# Patient Record
Sex: MALE | Race: WHITE | NOT HISPANIC OR LATINO | Employment: FULL TIME | URBAN - METROPOLITAN AREA
[De-identification: names, ages, dates, MRNs, and addresses within clinical notes are randomized per-mention and may not be internally consistent; named-entity substitution may affect disease eponyms.]

---

## 2020-12-10 ENCOUNTER — OFFICE VISIT (OUTPATIENT)
Dept: FAMILY MEDICINE | Facility: CLINIC | Age: 50
End: 2020-12-10
Payer: MEDICAID

## 2020-12-10 VITALS
WEIGHT: 228 LBS | OXYGEN SATURATION: 96 % | HEART RATE: 95 BPM | DIASTOLIC BLOOD PRESSURE: 74 MMHG | HEIGHT: 73 IN | BODY MASS INDEX: 30.22 KG/M2 | TEMPERATURE: 98 F | SYSTOLIC BLOOD PRESSURE: 110 MMHG

## 2020-12-10 DIAGNOSIS — Z12.5 SCREENING PSA (PROSTATE SPECIFIC ANTIGEN): ICD-10-CM

## 2020-12-10 DIAGNOSIS — R73.03 PREDIABETES: ICD-10-CM

## 2020-12-10 DIAGNOSIS — Z11.59 NEED FOR HEPATITIS C SCREENING TEST: ICD-10-CM

## 2020-12-10 DIAGNOSIS — Z13.220 SCREENING, LIPID: ICD-10-CM

## 2020-12-10 DIAGNOSIS — S09.90XA HEAD TRAUMA, INITIAL ENCOUNTER: ICD-10-CM

## 2020-12-10 DIAGNOSIS — R26.9 ABNORMAL GAIT: ICD-10-CM

## 2020-12-10 DIAGNOSIS — M25.562 ACUTE PAIN OF LEFT KNEE: Primary | ICD-10-CM

## 2020-12-10 PROCEDURE — 99204 PR OFFICE/OUTPT VISIT, NEW, LEVL IV, 45-59 MIN: ICD-10-PCS | Mod: S$GLB,,, | Performed by: NURSE PRACTITIONER

## 2020-12-10 PROCEDURE — 99204 OFFICE O/P NEW MOD 45 MIN: CPT | Mod: S$GLB,,, | Performed by: NURSE PRACTITIONER

## 2020-12-10 NOTE — PROGRESS NOTES
SUBJECTIVE:      Patient ID: Jaime Mendoza is a 50 y.o. male.    Chief Complaint: Annual Exam    Mr Mendoza is here today to establish care. He has a history of prediabetes, alcohol abuse and closed head injury(1987). He was previously followed by Dr Ward but has not seen him in years. He has not had an alcoholic drink in 9 years. He is  and has 4 children. He has a history of left knee surgery when he was in highschool. A few months ago he was visiting at his dads house and he got out of a high bed, his knee gave out, he stumbled and he hit head on a table. He did not loose consciousness and did not go to the ER. He says since that time he has pain in his left knee and he feels he does not have good strength in that leg.He denies any neurological deficits since the fall but says he sleeps more often and his gait is abnormal which he thinks could be from his knee.     Knee Pain   The incident occurred more than 1 week ago. The injury mechanism was a fall. The pain is present in the left knee. The quality of the pain is described as aching. The pain has been constant since onset. Pertinent negatives include no numbness. He has tried nothing for the symptoms.   Head Injury   The incident occurred more than 1 week ago. The injury mechanism was a direct blow. There was no loss of consciousness. The volume of blood lost was minimal. The patient is experiencing no pain. Pertinent negatives include no blurred vision, disorientation, headaches, memory loss, numbness, vomiting or weakness. He has tried nothing for the symptoms.       Past Surgical History:   Procedure Laterality Date    EYE SURGERY      KNEE SURGERY      left arm fused  1987    TONSILLECTOMY       Family History   Problem Relation Age of Onset    Hypertension Father     Diabetes Father       Social History     Socioeconomic History    Marital status:      Spouse name: Not on file    Number of children: Not on file    Years of  education: Not on file    Highest education level: Not on file   Occupational History    Not on file   Social Needs    Financial resource strain: Not on file    Food insecurity     Worry: Not on file     Inability: Not on file    Transportation needs     Medical: Not on file     Non-medical: Not on file   Tobacco Use    Smoking status: Current Every Day Smoker     Types: Cigars    Smokeless tobacco: Never Used   Substance and Sexual Activity    Alcohol use: No    Drug use: Not Currently     Comment: alcoholic  clean for 9 years    Sexual activity: Yes     Partners: Female   Lifestyle    Physical activity     Days per week: Not on file     Minutes per session: Not on file    Stress: Not on file   Relationships    Social connections     Talks on phone: Not on file     Gets together: Not on file     Attends Religion service: Not on file     Active member of club or organization: Not on file     Attends meetings of clubs or organizations: Not on file     Relationship status: Not on file   Other Topics Concern    Not on file   Social History Narrative    Not on file     No current outpatient medications on file.     No current facility-administered medications for this visit.      Review of patient's allergies indicates:  No Known Allergies   Past Medical History:   Diagnosis Date    Alcoholic     Head injury, closed     Hyperglycemia      Past Surgical History:   Procedure Laterality Date    EYE SURGERY      KNEE SURGERY      left arm fused  1987    TONSILLECTOMY         Review of Systems   Constitutional: Negative for appetite change, chills, diaphoresis and unexpected weight change.   HENT: Negative for ear discharge, facial swelling, hearing loss, nosebleeds and trouble swallowing.    Eyes: Negative for blurred vision, photophobia, pain and visual disturbance.   Respiratory: Negative for apnea, choking, shortness of breath and wheezing.    Cardiovascular: Negative for chest pain and  "palpitations.   Gastrointestinal: Negative for abdominal pain, blood in stool and vomiting.   Endocrine: Negative for polyphagia.   Genitourinary: Negative for difficulty urinating and hematuria.   Musculoskeletal: Positive for arthralgias (knee pain). Negative for gait problem and joint swelling.        Gait abnormality    Skin: Negative for pallor.   Neurological: Negative for dizziness, seizures, speech difficulty, weakness, numbness and headaches.   Hematological: Does not bruise/bleed easily.   Psychiatric/Behavioral: Negative for agitation, confusion, dysphoric mood and memory loss. The patient is not nervous/anxious.       OBJECTIVE:      Vitals:    12/10/20 1436   BP: 110/74   Pulse: 95   Temp: 98.1 °F (36.7 °C)   TempSrc: Skin   SpO2: 96%   Weight: 103.4 kg (228 lb)   Height: 6' 1" (1.854 m)     Physical Exam  Vitals signs and nursing note reviewed.   Constitutional:       General: He is not in acute distress.     Appearance: He is well-developed.   HENT:      Head: Normocephalic and atraumatic.      Nose: Nose normal.      Mouth/Throat:      Pharynx: Uvula midline.   Eyes:      General: Lids are normal.      Extraocular Movements: Extraocular movements intact.      Conjunctiva/sclera: Conjunctivae normal.      Pupils: Pupils are equal, round, and reactive to light.      Right eye: Pupil is round and reactive.      Left eye: Pupil is round and reactive.   Neck:      Musculoskeletal: Normal range of motion and neck supple.      Thyroid: No thyromegaly.      Vascular: No carotid bruit.   Cardiovascular:      Rate and Rhythm: Normal rate and regular rhythm.      Pulses: Normal pulses.      Heart sounds: Normal heart sounds.   Pulmonary:      Effort: Pulmonary effort is normal.      Breath sounds: Normal breath sounds. No wheezing or rhonchi.   Abdominal:      General: Bowel sounds are normal.      Palpations: Abdomen is soft. Abdomen is not rigid.      Tenderness: There is no abdominal tenderness. "   Musculoskeletal: Normal range of motion.      Left knee: He exhibits swelling. He exhibits no erythema. Tenderness found. Medial joint line tenderness noted.   Lymphadenopathy:      Cervical: No cervical adenopathy.   Skin:     General: Skin is warm and dry.      Nails: There is no clubbing.     Neurological:      Mental Status: He is alert and oriented to person, place, and time.      Motor: Motor function is intact.      Coordination: Coordination is intact.      Gait: Gait abnormal.   Psychiatric:         Attention and Perception: Attention normal.         Mood and Affect: Mood normal.         Speech: Speech normal.         Behavior: Behavior normal. Behavior is cooperative.         Thought Content: Thought content normal.        Assessment:       1. Acute pain of left knee    2. Prediabetes    3. Head trauma, initial encounter    4. Abnormal gait    5. Need for hepatitis C screening test    6. Screening PSA (prostate specific antigen)    7. Screening, lipid        Plan:       Acute pain of left knee  -     Ambulatory referral/consult to Orthopedics; Future; Expected date: 12/17/2020    Prediabetes  -     Comprehensive Metabolic Panel; Future; Expected date: 12/24/2020  -     CBC Auto Differential; Future; Expected date: 12/24/2020  -     Lipid Panel; Future; Expected date: 12/24/2020  -     TSH; Future; Expected date: 01/21/2021  -     Urinalysis; Future; Expected date: 12/24/2020    Head trauma, initial encounter  -     Ambulatory referral/consult to Neurology; Future; Expected date: 12/17/2020  -     CT Head Without Contrast; Future; Expected date: 12/10/2020    Abnormal gait  -     CT Head Without Contrast; Future; Expected date: 12/10/2020    Need for hepatitis C screening test  -     Hepatitis C antibody; Future; Expected date: 12/10/2020    Screening PSA (prostate specific antigen)  -     PSA, Screening; Future; Expected date: 12/24/2020    Screening, lipid  -     Lipid Panel; Future; Expected date:  12/24/2020        Follow up in about 4 weeks (around 1/7/2021) for prediabetes .      12/10/2020 CARMEN Jeronimo, FNP

## 2020-12-10 NOTE — PATIENT INSTRUCTIONS
Knee Pain  Knee pain is very common. Its especially common in active people who put a lot of pressure on their knees, like runners. It affects women more often than men.  Your kneecap (patella) is a thick, round bone. It covers and protects the front portion of your knee joint. It moves along a groove in your thighbone (femur) as part of the patellofemoral joint. A layer of cartilage surrounds the underside of your kneecap. This layer protects it from grinding against your femur.  When this cartilage softens and breaks down, it can cause knee pain. This is partly because of repetitive stress. The stress irritates the lining of the joint. This causes pain in the underlying bone.  What causes knee pain?  Many things can cause knee pain. You may have more than one cause. Some of these include:  · Overuse of the knee joint  · The kneecap doesnt line up with the tissue around it  · Damage to small nerves in the area  · Damage to the ligament-like structure that holds the kneecap in place (retinaculum)  · Breakdown of the bone under the cartilage  · Swelling in the soft tissues around the kneecap  · Injury  You might be more likely to have knee pain if you:  · Exercise a lot  · Recently increased the intensity of your workouts  · Have a body mass index (BMI) greater than 25  · Have poor alignment of your kneecap  · Walk with your feet turned overly outward or inward  · Have weakness in surrounding muscle groups (inner quad or hip adductor muscles)  · Have too much tightness in surrounding muscle groups (hamstrings or iliotibial band)  · Have a recent history of injury to the area  · Are female  Symptoms of knee pain  This type of knee pain is a dull, aching pain in the front of the knee in the area under and around the kneecap. This pain may start quickly or slowly. Your pain might be worse when you squat, run, or sit for a long time. You might also sometimes feel like your knee is giving out. You may have symptoms in  one or both of your knees.  Diagnosing knee pain  Your healthcare provider will ask about your medical history and your symptoms. Be sure to describe any activities that make your knee pain worse. He or she will look at your knee. This will include tests of your range of motion, strength, and areas of pain of your knee. Your knee alignment will be checked.  Your healthcare provider will need to rule out other causes of your knee pain, such as arthritis. You may need an imaging test, such as an X-ray or MRI.  Treatment for knee pain  Treatments that can help ease your symptoms may include:  · Avoiding activities for a while that make your pain worse, returning to activity over time  · Icing the outside of your knee when it causes you pain  · Taking over-the-counter pain medicine  · Wearing a knee brace or taping your knee to support it  · Wearing special shoe inserts to help keep your feet in the proper alignment  · Doing special exercises to stretch and strengthen the muscles around your hip and your knee  These steps help most people manage knee pain. But some cases of knee pain need to be treated with surgery. You may need surgery right away. Or you may need it later if other treatments dont work. Your healthcare provider may refer you to an orthopedic surgeon. He or she will talk with you about your choices.  Preventing knee pain  Losing weight and correcting excess muscle tightness or muscle weakness may help lower your risk.  In some cases, you can prevent knee pain. To help prevent a flare-up of knee pain, you do these things:  · Regularly do all the exercises your doctor or physical therapist advises  · Support your knee as advised by your doctor or physical therapist  · Increase training gradually, and ease up on training when needed  · Have an expert check your gait for running or other sporting activities  · Stretch properly before and after exercise  · Replace your running shoes regularly  · Lose excess  weight     When to call your healthcare provider  Call your healthcare provider right away if:  · Your symptoms dont get better after a few weeks of treatment  · You have any new symptoms   Date Last Reviewed: 4/1/2017  © 0709-8157 [employer]. [address] [place] [address], [place] [address]. All rights reserved. This information is not intended as a substitute for professional medical care. Always follow your healthcare professional's instructions.

## 2020-12-14 ENCOUNTER — TELEPHONE (OUTPATIENT)
Dept: FAMILY MEDICINE | Facility: CLINIC | Age: 50
End: 2020-12-14

## 2020-12-15 ENCOUNTER — HOSPITAL ENCOUNTER (OUTPATIENT)
Dept: RADIOLOGY | Facility: HOSPITAL | Age: 50
Discharge: HOME | End: 2020-12-15
Attending: NURSE PRACTITIONER
Payer: MEDICAID

## 2020-12-15 ENCOUNTER — TELEPHONE (OUTPATIENT)
Dept: FAMILY MEDICINE | Facility: CLINIC | Age: 50
End: 2020-12-15

## 2020-12-15 DIAGNOSIS — R26.9 ABNORMAL GAIT: ICD-10-CM

## 2020-12-15 DIAGNOSIS — S09.90XA HEAD TRAUMA, INITIAL ENCOUNTER: ICD-10-CM

## 2020-12-15 PROCEDURE — 70450 CT HEAD/BRAIN W/O DYE: CPT | Mod: TC

## 2020-12-17 ENCOUNTER — TELEPHONE (OUTPATIENT)
Dept: FAMILY MEDICINE | Facility: CLINIC | Age: 50
End: 2020-12-17

## 2020-12-17 NOTE — TELEPHONE ENCOUNTER
----- Message from Pedro Watt NP sent at 12/16/2020  8:00 AM CST -----  Will review results at upcoming OV

## 2020-12-18 NOTE — TELEPHONE ENCOUNTER
Spoke to pt CT results given, verbalized understanding. Referral faxed to Dr. Nile Guerra per pt request.

## 2020-12-22 ENCOUNTER — OFFICE VISIT (OUTPATIENT)
Dept: URGENT CARE | Facility: CLINIC | Age: 50
End: 2020-12-22
Payer: MEDICAID

## 2020-12-22 VITALS
RESPIRATION RATE: 16 BRPM | DIASTOLIC BLOOD PRESSURE: 82 MMHG | TEMPERATURE: 97 F | WEIGHT: 231 LBS | SYSTOLIC BLOOD PRESSURE: 130 MMHG | HEART RATE: 83 BPM | OXYGEN SATURATION: 94 % | BODY MASS INDEX: 30.48 KG/M2

## 2020-12-22 DIAGNOSIS — J40 BRONCHITIS: ICD-10-CM

## 2020-12-22 DIAGNOSIS — R05.9 COUGH: ICD-10-CM

## 2020-12-22 DIAGNOSIS — J10.1 INFLUENZA A: Primary | ICD-10-CM

## 2020-12-22 LAB
CTP QC/QA: YES
CTP QC/QA: YES
FLUAV AG NPH QL: POSITIVE
FLUBV AG NPH QL: NEGATIVE
SARS-COV-2 RDRP RESP QL NAA+PROBE: NEGATIVE

## 2020-12-22 PROCEDURE — 71046 X-RAY EXAM CHEST 2 VIEWS: CPT | Mod: TC,S$GLB,, | Performed by: EMERGENCY MEDICINE

## 2020-12-22 PROCEDURE — 99204 OFFICE O/P NEW MOD 45 MIN: CPT | Mod: 25,S$GLB,, | Performed by: NURSE PRACTITIONER

## 2020-12-22 PROCEDURE — 87804 INFLUENZA ASSAY W/OPTIC: CPT | Mod: QW,,, | Performed by: NURSE PRACTITIONER

## 2020-12-22 PROCEDURE — 87635 SARS-COV-2 COVID-19 AMP PRB: CPT | Mod: QW,S$GLB,, | Performed by: NURSE PRACTITIONER

## 2020-12-22 PROCEDURE — 87804 POCT INFLUENZA A/B: ICD-10-PCS | Mod: QW,,, | Performed by: NURSE PRACTITIONER

## 2020-12-22 PROCEDURE — 71046 PR XRAY, CHEST, 2 VIEWS: ICD-10-PCS | Mod: TC,S$GLB,, | Performed by: EMERGENCY MEDICINE

## 2020-12-22 PROCEDURE — 87635: ICD-10-PCS | Mod: QW,S$GLB,, | Performed by: NURSE PRACTITIONER

## 2020-12-22 PROCEDURE — 99204 PR OFFICE/OUTPT VISIT, NEW, LEVL IV, 45-59 MIN: ICD-10-PCS | Mod: 25,S$GLB,, | Performed by: NURSE PRACTITIONER

## 2020-12-22 RX ORDER — AZITHROMYCIN 250 MG/1
TABLET, FILM COATED ORAL
Qty: 6 TABLET | Refills: 0 | Status: SHIPPED | OUTPATIENT
Start: 2020-12-22 | End: 2021-01-13

## 2020-12-22 RX ORDER — PREDNISONE 20 MG/1
40 TABLET ORAL DAILY
Qty: 10 TABLET | Refills: 0 | Status: SHIPPED | OUTPATIENT
Start: 2020-12-22 | End: 2020-12-27

## 2020-12-22 RX ORDER — DEXCHLORPHENIRAMINE MALEATE, DEXTROMETHORPHAN HBR, PHENYLEPHRINE HCL 1; 10; 5 MG/5ML; MG/5ML; MG/5ML
5 SYRUP ORAL EVERY 4 HOURS PRN
Qty: 120 ML | Refills: 0 | Status: SHIPPED | OUTPATIENT
Start: 2020-12-22 | End: 2021-01-13

## 2020-12-22 RX ORDER — BALOXAVIR MARBOXIL 40 MG/1
80 TABLET, FILM COATED ORAL ONCE
Qty: 2 TABLET | Refills: 0 | Status: SHIPPED | OUTPATIENT
Start: 2020-12-22 | End: 2020-12-22

## 2020-12-22 RX ORDER — ALBUTEROL SULFATE 90 UG/1
2 AEROSOL, METERED RESPIRATORY (INHALATION) EVERY 6 HOURS PRN
Qty: 18 G | Refills: 0 | Status: SHIPPED | OUTPATIENT
Start: 2020-12-22 | End: 2021-01-13

## 2020-12-22 RX ORDER — IPRATROPIUM BROMIDE 0.5 MG/2.5ML
0.5 SOLUTION RESPIRATORY (INHALATION)
Status: SHIPPED | OUTPATIENT
Start: 2020-12-22

## 2020-12-22 RX ORDER — ALBUTEROL SULFATE 0.83 MG/ML
2.5 SOLUTION RESPIRATORY (INHALATION)
Status: SHIPPED | OUTPATIENT
Start: 2020-12-22

## 2020-12-22 NOTE — PATIENT INSTRUCTIONS
Influenza (Adult)    Influenza is also called the flu. It is a viral illness that affects the air passages of your lungs. It is different from the common cold. The flu can easily be passed from one to person to another. It may be spread through the air by coughing and sneezing. Or it can be spread by touching the sick person and then touching your own eyes, nose, or mouth.  The flu starts 1 to 3 days after you are exposed to the flu virus. It may last for 1 to 2 weeks but many people feel tired or fatigued for many weeks afterward. You usually dont need to take antibiotics unless you have a complication. This might be an ear or sinus infection or pneumonia.  Symptoms of the flu may be mild or severe. They can include extreme tiredness (wanting to stay in bed all day), chills, fevers, muscle aches, soreness with eye movement, headache, and a dry, hacking cough.  Home care  Follow these guidelines when caring for yourself at home:  · Avoid being around cigarette smoke, whether yours or other peoples.  · Acetaminophen or ibuprofen will help ease your fever, muscle aches, and headache. Dont give aspirin to anyone younger than 18 who has the flu. Aspirin can harm the liver.  · Nausea and loss of appetite are common with the flu. Eat light meals. Drink 6 to 8 glasses of liquids every day. Good choices are water, sport drinks, soft drinks without caffeine, juices, tea, and soup. Extra fluids will also help loosen secretions in your nose and lungs.  · Over-the-counter cold medicines will not make the flu go away faster. But the medicines may help with coughing, sore throat, and congestion in your nose and sinuses. Dont use a decongestant if you have high blood pressure.  · Stay home until your fever has been gone for at least 24 hours without using medicine to reduce fever.  Follow-up care  Follow up with your healthcare provider, or as advised, if you are not getting better over the next week.  If you are age 65 or  older, talk with your provider about getting a pneumococcal vaccine every 5 years. You should also get this vaccine if you have chronic asthma or COPD. All adults should get a flu vaccine every fall. Ask your provider about this.  When to seek medical advice  Call your healthcare provider right away if any of these occur:  · Cough with lots of colored mucus (sputum) or blood in your mucus  · Chest pain, shortness of breath, wheezing, or trouble breathing  · Severe headache, or face, neck, or ear pain  · New rash with fever  · Fever of 100.4°F (38°C) or higher, or as directed by your healthcare provider  · Confusion, behavior change, or seizure  · Severe weakness or dizziness  · You get a new fever or cough after getting better for a few days  Date Last Reviewed: 1/1/2017 © 2000-2017 [employer]. [address] [place] [address]. All rights reserved. This information is not intended as a substitute for professional medical care. Always follow your healthcare professional's instructions.        Bronchitis with Wheezing (Viral or Bacterial: Adult)    Bronchitis is an infection of the air passages. It often occurs during a cold and is usually caused by a virus. Symptoms include cough with mucus (phlegm) and low-grade fever. This illness is contagious during the first few days and is spread through the air by coughing and sneezing, or by direct contact (touching the sick person and then touching your own eyes, nose, or mouth).  If there is a lot of inflammation, air flow is restricted. The air passages may also go into spasm, especially if you have asthma. This causes wheezing and difficulty breathing even in people who do not have asthma.  Bronchitis usually lasts 7 to 14 days. The wheezing should improve with treatment during the first week. An inhaler is often prescribed to relax the air passages and stop wheezing. Antibiotics will be prescribed if your doctor thinks there is also a secondary  bacterial infection.  Home care  · If symptoms are severe, rest at home for the first 2 to 3 days. When you go back to your usual activities, don't let yourself get too tired.  · Do not smoke. Also avoid being exposed to secondhand smoke.  · You may use over-the-counter medicine to control fever or pain, unless another medicine was prescribed. Note: If you have chronic liver or kidney disease or have ever had a stomach ulcer or gastrointestinal bleeding, talk with your healthcare provider before using these medicines. Also talk to your provider if you are taking medicine to prevent blood clots.) Aspirin should never be given to anyone younger than 18 years of age who is ill with a viral infection or fever. It may cause severe liver or brain damage.  · Your appetite may be poor, so a light diet is fine. Avoid dehydration by drinking 6 to 8 glasses of fluids per day (such as water, soft drinks, sports drinks, juices, tea, or soup). Extra fluids will help loosen secretions in the nose and lungs.  · Over-the-counter cough, cold, and sore-throat medicines will not shorten the length of the illness, but they may be helpful to reduce symptoms. (Note: Do not use decongestants if you have high blood pressure.)  · If you were given an inhaler, use it exactly as directed. If you need to use it more often than prescribed, your condition may be worsening. If this happens, contact your healthcare provider.  · If prescribed, finish all antibiotic medicine, even if you are feeling better after only a few days.  Follow-up care  Follow up with your healthcare provider, or as advised. If you had an X-ray or ECG (electrocardiogram), a specialist will review it. You will be notified of any new findings that may affect your care.  Note: If you are age 65 or older, or if you have a chronic lung disease or condition that affects your immune system, or you smoke, talk to your healthcare provider about having a pneumococcal vaccinations and  a yearly influenza vaccination (flu shot).  When to seek medical advice  Call your healthcare provider right away if any of these occur:  · Fever of 100.4°F (38°C) or higher  · Coughing up increasing amounts of colored sputum  · Weakness, drowsiness, headache, facial pain, ear pain, or a stiff neck  Call 911, or get immediate medical care  Contact emergency services right away if any of these occur.  · Coughing up blood  · Worsening weakness, drowsiness, headache, or stiff neck  · Increased wheezing not helped with medication, shortness of breath, or pain with breathing  Date Last Reviewed: 9/13/2015  © 4377-3914 [employer]. [address] [place] [address], [place] [address]. All rights reserved. This information is not intended as a substitute for professional medical care. Always follow your healthcare professional's instructions.

## 2020-12-22 NOTE — PROGRESS NOTES
Subjective: post nasal drip, fatigue, runny nose, stuffy nose, congestion x 3 days       Patient ID: Jaime Mendoza is a 50 y.o. male.    Vitals:  weight is 104.8 kg (231 lb). His temperature is 97.4 °F (36.3 °C). His blood pressure is 130/82 and his pulse is 83. His respiration is 16 and oxygen saturation is 94% (abnormal).     Chief Complaint: Sinus Problem (post nasal drip, fatigue, congestion)    patient complains of fatigue, body aches, chest congestion, wheezing, cough, and sinus congestion for 4 days. Denies any known covid exposure. Denies chest pain, sob, nausea, vomiting, diarrhea.     Sinus Problem  This is a new problem. The current episode started in the past 7 days. Associated symptoms include congestion, coughing and sinus pressure. Pertinent negatives include no chills, headaches, shortness of breath or sore throat.       Constitution: Positive for fatigue. Negative for chills and fever.   HENT: Positive for congestion and sinus pressure. Negative for sore throat.    Neck: Negative for painful lymph nodes.   Cardiovascular: Negative for chest pain and leg swelling.   Eyes: Negative for double vision and blurred vision.   Respiratory: Positive for cough and wheezing. Negative for shortness of breath.    Gastrointestinal: Negative for abdominal pain, nausea, vomiting and diarrhea.   Genitourinary: Negative for dysuria, frequency and urgency.   Musculoskeletal: Positive for muscle ache. Negative for joint pain, joint swelling and muscle cramps.   Skin: Negative for color change, pale and rash.   Allergic/Immunologic: Negative for seasonal allergies.   Neurological: Negative for dizziness, history of vertigo, light-headedness, passing out and headaches.   Hematologic/Lymphatic: Negative for swollen lymph nodes, easy bruising/bleeding and history of blood clots. Does not bruise/bleed easily.   Psychiatric/Behavioral: Negative for nervous/anxious, sleep disturbance and depression. The patient is not  nervous/anxious.        Objective:      Physical Exam   Constitutional: He is oriented to person, place, and time. He appears well-developed. He is cooperative.  Non-toxic appearance. He does not appear ill. No distress.   HENT:   Head: Normocephalic and atraumatic.   Ears:   Right Ear: Hearing, tympanic membrane, external ear and ear canal normal.   Left Ear: Hearing, tympanic membrane, external ear and ear canal normal.   Nose: Nose normal. No mucosal edema, rhinorrhea or nasal deformity. No epistaxis. Right sinus exhibits no maxillary sinus tenderness and no frontal sinus tenderness. Left sinus exhibits no maxillary sinus tenderness and no frontal sinus tenderness.   Mouth/Throat: Uvula is midline, oropharynx is clear and moist and mucous membranes are normal. No trismus in the jaw. Normal dentition. No uvula swelling. No posterior oropharyngeal erythema.   Eyes: Conjunctivae and lids are normal. Right eye exhibits no discharge. Left eye exhibits no discharge. No scleral icterus.   Neck: Trachea normal, normal range of motion, full passive range of motion without pain and phonation normal. Neck supple.   Cardiovascular: Normal rate, regular rhythm, normal heart sounds and normal pulses.   Pulmonary/Chest: Effort normal. No respiratory distress. He has wheezes in the right upper field, the right lower field, the left upper field and the left lower field. He has rhonchi in the right upper field, the right lower field, the left upper field and the left lower field.   Abdominal: Soft. Normal appearance and bowel sounds are normal. He exhibits no distension, no pulsatile midline mass and no mass. There is no abdominal tenderness.   Musculoskeletal: Normal range of motion.         General: No deformity.   Neurological: He is alert and oriented to person, place, and time. He exhibits normal muscle tone. Coordination normal. GCS eye subscore is 4. GCS verbal subscore is 5. GCS motor subscore is 6.   Skin: Skin is warm,  dry, intact, not diaphoretic and not pale. Psychiatric: His speech is normal and behavior is normal. Judgment and thought content normal.   Nursing note and vitals reviewed.        Assessment:       1. Cough    2. Influenza A        Plan:       Covid negative. Influenza Positive. CXR small pleural effusion to left lower lung.    Patient is flu positive. Discussed the importance of pushing fluids to stay hydrated, controlling fever with tylenol/motrin, and good handwashing to prevent spread of virus.    Patient reports he is breathing much better after the breathing treatment. Sats increased to 97% after the breathing treatment.  Advised to go to ER for any worsening of symptoms.     Cough  -     POCT COVID-19 Rapid Screening  -     X-Ray Chest PA And Lateral; Future; Expected date: 12/22/2020  -     POCT Influenza A/B    Influenza A    Other orders  -     albuterol nebulizer solution 2.5 mg  -     ipratropium 0.02 % nebulizer solution 0.5 mg  -     baloxavir marboxiL (XOFLUZA) 40 mg tablet; Take 2 tablets (80 mg total) by mouth once. for 1 dose  Dispense: 2 tablet; Refill: 0  -     dexchlorphen-phenylephrine-DM (POLYTUSSIN DM) 1-5-10 mg/5 mL Syrp; Take 5 mLs by mouth every 4 (four) hours as needed.  Dispense: 120 mL; Refill: 0  -     predniSONE (DELTASONE) 20 MG tablet; Take 2 tablets (40 mg total) by mouth once daily. for 5 days  Dispense: 10 tablet; Refill: 0  -     albuterol (PROVENTIL HFA) 90 mcg/actuation inhaler; Inhale 2 puffs into the lungs every 6 (six) hours as needed for Wheezing. Rescue  Dispense: 18 g; Refill: 0  -     azithromycin (ZITHROMAX) 250 MG tablet; Take 2 tablets (500 mg) on  Day 1,  followed by 1 tablet (250 mg) once daily on Days 2 through 5.  Dispense: 6 tablet; Refill: 0

## 2021-01-05 ENCOUNTER — TELEPHONE (OUTPATIENT)
Dept: FAMILY MEDICINE | Facility: CLINIC | Age: 51
End: 2021-01-05

## 2021-01-05 DIAGNOSIS — S09.90XA HEAD TRAUMA, INITIAL ENCOUNTER: ICD-10-CM

## 2021-01-05 DIAGNOSIS — M25.562 ACUTE PAIN OF LEFT KNEE: Primary | ICD-10-CM

## 2021-01-13 ENCOUNTER — OFFICE VISIT (OUTPATIENT)
Dept: FAMILY MEDICINE | Facility: CLINIC | Age: 51
End: 2021-01-13
Payer: MEDICAID

## 2021-01-13 VITALS
HEIGHT: 73 IN | WEIGHT: 227 LBS | TEMPERATURE: 98 F | BODY MASS INDEX: 30.09 KG/M2 | SYSTOLIC BLOOD PRESSURE: 120 MMHG | DIASTOLIC BLOOD PRESSURE: 80 MMHG | OXYGEN SATURATION: 95 % | HEART RATE: 92 BPM

## 2021-01-13 DIAGNOSIS — R73.03 PREDIABETES: Primary | ICD-10-CM

## 2021-01-13 DIAGNOSIS — Z12.11 COLON CANCER SCREENING: ICD-10-CM

## 2021-01-13 DIAGNOSIS — E78.00 ELEVATED LDL CHOLESTEROL LEVEL: ICD-10-CM

## 2021-01-13 DIAGNOSIS — Z87.828 HISTORY OF HEAD INJURY: ICD-10-CM

## 2021-01-13 LAB — HBA1C MFR BLD: 6.4 %

## 2021-01-13 PROCEDURE — 99214 PR OFFICE/OUTPT VISIT, EST, LEVL IV, 30-39 MIN: ICD-10-PCS | Mod: S$GLB,,, | Performed by: NURSE PRACTITIONER

## 2021-01-13 PROCEDURE — 83036 POCT HEMOGLOBIN A1C: ICD-10-PCS | Mod: QW,,, | Performed by: NURSE PRACTITIONER

## 2021-01-13 PROCEDURE — 99214 OFFICE O/P EST MOD 30 MIN: CPT | Mod: S$GLB,,, | Performed by: NURSE PRACTITIONER

## 2021-01-13 PROCEDURE — 83036 HEMOGLOBIN GLYCOSYLATED A1C: CPT | Mod: QW,,, | Performed by: NURSE PRACTITIONER

## 2021-01-21 ENCOUNTER — OFFICE VISIT (OUTPATIENT)
Dept: ORTHOPEDICS | Facility: CLINIC | Age: 51
End: 2021-01-21
Payer: MEDICAID

## 2021-01-21 VITALS
HEART RATE: 88 BPM | WEIGHT: 224 LBS | DIASTOLIC BLOOD PRESSURE: 84 MMHG | SYSTOLIC BLOOD PRESSURE: 120 MMHG | HEIGHT: 73 IN | BODY MASS INDEX: 29.69 KG/M2

## 2021-01-21 DIAGNOSIS — M17.12 PRIMARY OSTEOARTHRITIS OF LEFT KNEE: Primary | ICD-10-CM

## 2021-01-21 DIAGNOSIS — M25.562 ACUTE PAIN OF LEFT KNEE: ICD-10-CM

## 2021-01-21 PROCEDURE — 99203 OFFICE O/P NEW LOW 30 MIN: CPT | Mod: 25,S$GLB,, | Performed by: ORTHOPAEDIC SURGERY

## 2021-01-21 PROCEDURE — 20610 LARGE JOINT ASPIRATION/INJECTION: L KNEE: ICD-10-PCS | Mod: LT,S$GLB,, | Performed by: ORTHOPAEDIC SURGERY

## 2021-01-21 PROCEDURE — 20610 DRAIN/INJ JOINT/BURSA W/O US: CPT | Mod: LT,S$GLB,, | Performed by: ORTHOPAEDIC SURGERY

## 2021-01-21 PROCEDURE — 99203 PR OFFICE/OUTPT VISIT, NEW, LEVL III, 30-44 MIN: ICD-10-PCS | Mod: 25,S$GLB,, | Performed by: ORTHOPAEDIC SURGERY

## 2021-01-21 RX ORDER — METHYLPREDNISOLONE ACETATE 40 MG/ML
40 INJECTION, SUSPENSION INTRA-ARTICULAR; INTRALESIONAL; INTRAMUSCULAR; SOFT TISSUE
Status: DISCONTINUED | OUTPATIENT
Start: 2021-01-21 | End: 2021-01-21 | Stop reason: HOSPADM

## 2021-01-21 RX ADMIN — METHYLPREDNISOLONE ACETATE 40 MG: 40 INJECTION, SUSPENSION INTRA-ARTICULAR; INTRALESIONAL; INTRAMUSCULAR; SOFT TISSUE at 08:01

## 2021-01-27 DIAGNOSIS — R29.898 LEFT LEG WEAKNESS: ICD-10-CM

## 2021-01-27 DIAGNOSIS — I63.9 ISCHEMIC STROKE: ICD-10-CM

## 2021-01-27 DIAGNOSIS — S06.9X0A: Primary | ICD-10-CM

## 2021-02-02 ENCOUNTER — CLINICAL SUPPORT (OUTPATIENT)
Dept: REHABILITATION | Facility: HOSPITAL | Age: 51
End: 2021-02-02
Attending: NURSE PRACTITIONER
Payer: MEDICAID

## 2021-02-02 DIAGNOSIS — I63.9 ISCHEMIC STROKE: ICD-10-CM

## 2021-02-02 DIAGNOSIS — S06.9X0S TRAUMATIC BRAIN INJURY WITHOUT LOSS OF CONSCIOUSNESS, SEQUELA: Primary | ICD-10-CM

## 2021-02-02 DIAGNOSIS — R29.898 LEFT LEG WEAKNESS: ICD-10-CM

## 2021-02-02 DIAGNOSIS — S06.9X0A: ICD-10-CM

## 2021-02-02 DIAGNOSIS — R26.89 BALANCE DISORDER: ICD-10-CM

## 2021-02-02 DIAGNOSIS — R26.81 GAIT INSTABILITY: ICD-10-CM

## 2021-02-02 PROCEDURE — 97162 PT EVAL MOD COMPLEX 30 MIN: CPT | Mod: PN

## 2021-02-09 ENCOUNTER — CLINICAL SUPPORT (OUTPATIENT)
Dept: REHABILITATION | Facility: HOSPITAL | Age: 51
End: 2021-02-09
Payer: MEDICAID

## 2021-02-09 DIAGNOSIS — R26.89 BALANCE DISORDER: ICD-10-CM

## 2021-02-09 DIAGNOSIS — R29.898 WEAKNESS OF LEFT LOWER EXTREMITY: ICD-10-CM

## 2021-02-09 DIAGNOSIS — R26.81 GAIT INSTABILITY: ICD-10-CM

## 2021-02-09 PROCEDURE — 97110 THERAPEUTIC EXERCISES: CPT | Mod: PN

## 2021-02-11 ENCOUNTER — CLINICAL SUPPORT (OUTPATIENT)
Dept: REHABILITATION | Facility: HOSPITAL | Age: 51
End: 2021-02-11
Payer: MEDICAID

## 2021-02-11 DIAGNOSIS — R26.89 BALANCE DISORDER: ICD-10-CM

## 2021-02-11 DIAGNOSIS — R26.81 GAIT INSTABILITY: ICD-10-CM

## 2021-02-11 DIAGNOSIS — R29.898 WEAKNESS OF LEFT LOWER EXTREMITY: ICD-10-CM

## 2021-02-11 PROCEDURE — 97110 THERAPEUTIC EXERCISES: CPT | Mod: PN

## 2021-02-18 ENCOUNTER — CLINICAL SUPPORT (OUTPATIENT)
Dept: REHABILITATION | Facility: HOSPITAL | Age: 51
End: 2021-02-18
Payer: MEDICAID

## 2021-02-18 DIAGNOSIS — R26.89 BALANCE DISORDER: ICD-10-CM

## 2021-02-18 DIAGNOSIS — R26.81 GAIT INSTABILITY: ICD-10-CM

## 2021-02-18 DIAGNOSIS — R29.898 WEAKNESS OF RIGHT LOWER EXTREMITY: ICD-10-CM

## 2021-02-18 PROCEDURE — 97110 THERAPEUTIC EXERCISES: CPT | Mod: PN

## 2021-02-23 ENCOUNTER — CLINICAL SUPPORT (OUTPATIENT)
Dept: REHABILITATION | Facility: HOSPITAL | Age: 51
End: 2021-02-23
Payer: MEDICAID

## 2021-02-23 ENCOUNTER — OFFICE VISIT (OUTPATIENT)
Dept: ORTHOPEDICS | Facility: CLINIC | Age: 51
End: 2021-02-23
Payer: MEDICAID

## 2021-02-23 VITALS
HEART RATE: 68 BPM | WEIGHT: 224 LBS | DIASTOLIC BLOOD PRESSURE: 74 MMHG | SYSTOLIC BLOOD PRESSURE: 132 MMHG | BODY MASS INDEX: 29.69 KG/M2 | HEIGHT: 73 IN

## 2021-02-23 DIAGNOSIS — M17.12 PRIMARY OSTEOARTHRITIS OF LEFT KNEE: Primary | ICD-10-CM

## 2021-02-23 DIAGNOSIS — M17.12 PRIMARY OSTEOARTHRITIS OF LEFT KNEE: ICD-10-CM

## 2021-02-23 DIAGNOSIS — R26.81 GAIT INSTABILITY: ICD-10-CM

## 2021-02-23 DIAGNOSIS — R26.89 BALANCE DISORDER: ICD-10-CM

## 2021-02-23 DIAGNOSIS — R29.898 WEAKNESS OF RIGHT LOWER EXTREMITY: ICD-10-CM

## 2021-02-23 PROCEDURE — 97110 THERAPEUTIC EXERCISES: CPT | Mod: PN

## 2021-02-23 PROCEDURE — 99213 PR OFFICE/OUTPT VISIT, EST, LEVL III, 20-29 MIN: ICD-10-PCS | Mod: S$GLB,,, | Performed by: ORTHOPAEDIC SURGERY

## 2021-02-23 PROCEDURE — 99213 OFFICE O/P EST LOW 20 MIN: CPT | Mod: S$GLB,,, | Performed by: ORTHOPAEDIC SURGERY

## 2021-02-26 ENCOUNTER — CLINICAL SUPPORT (OUTPATIENT)
Dept: REHABILITATION | Facility: HOSPITAL | Age: 51
End: 2021-02-26
Payer: MEDICAID

## 2021-02-26 DIAGNOSIS — R29.898 WEAKNESS OF RIGHT LOWER EXTREMITY: ICD-10-CM

## 2021-02-26 DIAGNOSIS — R26.89 BALANCE DISORDER: ICD-10-CM

## 2021-02-26 DIAGNOSIS — R26.81 GAIT INSTABILITY: ICD-10-CM

## 2021-02-26 PROCEDURE — 97110 THERAPEUTIC EXERCISES: CPT | Mod: PN

## 2021-03-02 ENCOUNTER — CLINICAL SUPPORT (OUTPATIENT)
Dept: REHABILITATION | Facility: HOSPITAL | Age: 51
End: 2021-03-02
Payer: MEDICAID

## 2021-03-02 DIAGNOSIS — R26.89 BALANCE DISORDER: ICD-10-CM

## 2021-03-02 DIAGNOSIS — R29.898 WEAKNESS OF RIGHT LOWER EXTREMITY: ICD-10-CM

## 2021-03-02 DIAGNOSIS — R26.81 GAIT INSTABILITY: ICD-10-CM

## 2021-03-02 PROCEDURE — 97110 THERAPEUTIC EXERCISES: CPT | Mod: PN

## 2021-03-09 ENCOUNTER — CLINICAL SUPPORT (OUTPATIENT)
Dept: REHABILITATION | Facility: HOSPITAL | Age: 51
End: 2021-03-09
Payer: MEDICAID

## 2021-03-09 DIAGNOSIS — R26.89 BALANCE DISORDER: ICD-10-CM

## 2021-03-09 DIAGNOSIS — R29.898 WEAKNESS OF LEFT LOWER EXTREMITY: Primary | ICD-10-CM

## 2021-03-09 DIAGNOSIS — R26.81 GAIT INSTABILITY: ICD-10-CM

## 2021-03-09 PROCEDURE — 97110 THERAPEUTIC EXERCISES: CPT | Mod: PN

## 2021-03-10 ENCOUNTER — TELEPHONE (OUTPATIENT)
Dept: REHABILITATION | Facility: HOSPITAL | Age: 51
End: 2021-03-10

## 2021-03-11 ENCOUNTER — CLINICAL SUPPORT (OUTPATIENT)
Dept: REHABILITATION | Facility: HOSPITAL | Age: 51
End: 2021-03-11
Payer: MEDICAID

## 2021-03-11 DIAGNOSIS — R29.898 WEAKNESS OF LEFT LOWER EXTREMITY: ICD-10-CM

## 2021-03-11 DIAGNOSIS — R26.81 GAIT INSTABILITY: ICD-10-CM

## 2021-03-11 DIAGNOSIS — R26.89 BALANCE DISORDER: ICD-10-CM

## 2021-03-11 PROCEDURE — 97110 THERAPEUTIC EXERCISES: CPT | Mod: PN

## 2021-03-16 ENCOUNTER — CLINICAL SUPPORT (OUTPATIENT)
Dept: REHABILITATION | Facility: HOSPITAL | Age: 51
End: 2021-03-16
Payer: MEDICAID

## 2021-03-16 DIAGNOSIS — R26.81 GAIT INSTABILITY: ICD-10-CM

## 2021-03-16 DIAGNOSIS — R26.89 BALANCE DISORDER: ICD-10-CM

## 2021-03-16 DIAGNOSIS — R29.898 WEAKNESS OF LEFT LOWER EXTREMITY: ICD-10-CM

## 2021-03-16 PROCEDURE — 97110 THERAPEUTIC EXERCISES: CPT | Mod: PN

## 2021-03-18 ENCOUNTER — CLINICAL SUPPORT (OUTPATIENT)
Dept: REHABILITATION | Facility: HOSPITAL | Age: 51
End: 2021-03-18
Payer: MEDICAID

## 2021-03-18 DIAGNOSIS — R26.81 GAIT INSTABILITY: ICD-10-CM

## 2021-03-18 DIAGNOSIS — R29.898 WEAKNESS OF LEFT LOWER EXTREMITY: ICD-10-CM

## 2021-03-18 DIAGNOSIS — R26.89 BALANCE DISORDER: ICD-10-CM

## 2021-03-18 PROCEDURE — 97110 THERAPEUTIC EXERCISES: CPT | Mod: PN

## 2021-03-23 ENCOUNTER — CLINICAL SUPPORT (OUTPATIENT)
Dept: REHABILITATION | Facility: HOSPITAL | Age: 51
End: 2021-03-23
Payer: MEDICAID

## 2021-03-23 DIAGNOSIS — R26.81 GAIT INSTABILITY: ICD-10-CM

## 2021-03-23 DIAGNOSIS — R29.898 WEAKNESS OF LEFT LOWER EXTREMITY: ICD-10-CM

## 2021-03-23 DIAGNOSIS — R26.89 BALANCE DISORDER: ICD-10-CM

## 2021-03-23 PROCEDURE — 97110 THERAPEUTIC EXERCISES: CPT | Mod: PN

## 2021-03-25 ENCOUNTER — CLINICAL SUPPORT (OUTPATIENT)
Dept: REHABILITATION | Facility: HOSPITAL | Age: 51
End: 2021-03-25
Payer: MEDICAID

## 2021-03-25 DIAGNOSIS — R29.898 WEAKNESS OF LEFT LOWER EXTREMITY: ICD-10-CM

## 2021-03-25 DIAGNOSIS — R26.89 BALANCE DISORDER: ICD-10-CM

## 2021-03-25 DIAGNOSIS — R26.81 GAIT INSTABILITY: ICD-10-CM

## 2021-03-25 PROCEDURE — 97110 THERAPEUTIC EXERCISES: CPT | Mod: PN

## 2021-03-31 ENCOUNTER — CLINICAL SUPPORT (OUTPATIENT)
Dept: REHABILITATION | Facility: HOSPITAL | Age: 51
End: 2021-03-31
Payer: MEDICAID

## 2021-03-31 DIAGNOSIS — R26.81 GAIT INSTABILITY: ICD-10-CM

## 2021-03-31 DIAGNOSIS — R29.898 WEAKNESS OF LEFT LOWER EXTREMITY: ICD-10-CM

## 2021-03-31 DIAGNOSIS — R26.89 BALANCE DISORDER: ICD-10-CM

## 2021-03-31 PROCEDURE — 97110 THERAPEUTIC EXERCISES: CPT | Mod: PN

## 2021-04-01 ENCOUNTER — IMMUNIZATION (OUTPATIENT)
Dept: PRIMARY CARE CLINIC | Facility: CLINIC | Age: 51
End: 2021-04-01
Payer: MEDICAID

## 2021-04-01 ENCOUNTER — CLINICAL SUPPORT (OUTPATIENT)
Dept: REHABILITATION | Facility: HOSPITAL | Age: 51
End: 2021-04-01
Payer: MEDICAID

## 2021-04-01 DIAGNOSIS — R26.89 BALANCE DISORDER: ICD-10-CM

## 2021-04-01 DIAGNOSIS — R29.898 WEAKNESS OF LEFT LOWER EXTREMITY: ICD-10-CM

## 2021-04-01 DIAGNOSIS — R26.81 GAIT INSTABILITY: ICD-10-CM

## 2021-04-01 DIAGNOSIS — Z23 NEED FOR VACCINATION: Primary | ICD-10-CM

## 2021-04-01 PROCEDURE — 91300 COVID-19, MRNA, LNP-S, PF, 30 MCG/0.3 ML DOSE VACCINE: ICD-10-PCS | Mod: S$GLB,,, | Performed by: FAMILY MEDICINE

## 2021-04-01 PROCEDURE — 91300 COVID-19, MRNA, LNP-S, PF, 30 MCG/0.3 ML DOSE VACCINE: CPT | Mod: S$GLB,,, | Performed by: FAMILY MEDICINE

## 2021-04-01 PROCEDURE — 0001A COVID-19, MRNA, LNP-S, PF, 30 MCG/0.3 ML DOSE VACCINE: ICD-10-PCS | Mod: CV19,S$GLB,, | Performed by: FAMILY MEDICINE

## 2021-04-01 PROCEDURE — 0001A COVID-19, MRNA, LNP-S, PF, 30 MCG/0.3 ML DOSE VACCINE: CPT | Mod: CV19,S$GLB,, | Performed by: FAMILY MEDICINE

## 2021-04-01 PROCEDURE — 97110 THERAPEUTIC EXERCISES: CPT | Mod: PN

## 2021-04-06 ENCOUNTER — CLINICAL SUPPORT (OUTPATIENT)
Dept: REHABILITATION | Facility: HOSPITAL | Age: 51
End: 2021-04-06
Payer: MEDICAID

## 2021-04-06 DIAGNOSIS — R26.89 BALANCE DISORDER: ICD-10-CM

## 2021-04-06 DIAGNOSIS — R26.81 GAIT INSTABILITY: ICD-10-CM

## 2021-04-06 DIAGNOSIS — R29.898 WEAKNESS OF LEFT LOWER EXTREMITY: ICD-10-CM

## 2021-04-06 PROCEDURE — 97110 THERAPEUTIC EXERCISES: CPT | Mod: PN

## 2021-04-08 ENCOUNTER — CLINICAL SUPPORT (OUTPATIENT)
Dept: REHABILITATION | Facility: HOSPITAL | Age: 51
End: 2021-04-08
Payer: MEDICAID

## 2021-04-08 DIAGNOSIS — R26.89 BALANCE DISORDER: ICD-10-CM

## 2021-04-08 DIAGNOSIS — R26.81 GAIT INSTABILITY: ICD-10-CM

## 2021-04-08 DIAGNOSIS — R29.898 WEAKNESS OF LEFT LOWER EXTREMITY: ICD-10-CM

## 2021-04-08 PROCEDURE — 97110 THERAPEUTIC EXERCISES: CPT | Mod: PN

## 2021-04-09 DIAGNOSIS — I63.9 ISCHEMIC STROKE: ICD-10-CM

## 2021-04-09 DIAGNOSIS — R29.898 LEFT LEG WEAKNESS: ICD-10-CM

## 2021-04-09 DIAGNOSIS — S06.9X0A TRAUMATIC BRAIN INJURY WITHOUT LOSS OF CONSCIOUSNESS, INITIAL ENCOUNTER: Primary | ICD-10-CM

## 2021-04-13 ENCOUNTER — CLINICAL SUPPORT (OUTPATIENT)
Dept: REHABILITATION | Facility: HOSPITAL | Age: 51
End: 2021-04-13
Payer: MEDICAID

## 2021-04-13 DIAGNOSIS — R26.81 GAIT INSTABILITY: ICD-10-CM

## 2021-04-13 DIAGNOSIS — R26.89 BALANCE DISORDER: ICD-10-CM

## 2021-04-13 DIAGNOSIS — R29.898 WEAKNESS OF LEFT LOWER EXTREMITY: ICD-10-CM

## 2021-04-13 PROCEDURE — 97112 NEUROMUSCULAR REEDUCATION: CPT | Mod: PN

## 2021-04-13 PROCEDURE — 97110 THERAPEUTIC EXERCISES: CPT | Mod: PN

## 2021-04-15 ENCOUNTER — CLINICAL SUPPORT (OUTPATIENT)
Dept: REHABILITATION | Facility: HOSPITAL | Age: 51
End: 2021-04-15
Payer: MEDICAID

## 2021-04-15 DIAGNOSIS — R29.898 WEAKNESS OF LEFT LOWER EXTREMITY: ICD-10-CM

## 2021-04-15 DIAGNOSIS — R26.89 BALANCE DISORDER: ICD-10-CM

## 2021-04-15 DIAGNOSIS — R26.81 GAIT INSTABILITY: ICD-10-CM

## 2021-04-15 PROCEDURE — 97110 THERAPEUTIC EXERCISES: CPT | Mod: PN

## 2021-04-20 ENCOUNTER — CLINICAL SUPPORT (OUTPATIENT)
Dept: REHABILITATION | Facility: HOSPITAL | Age: 51
End: 2021-04-20
Payer: MEDICAID

## 2021-04-20 DIAGNOSIS — R26.81 GAIT INSTABILITY: ICD-10-CM

## 2021-04-20 DIAGNOSIS — R29.898 WEAKNESS OF LEFT LOWER EXTREMITY: ICD-10-CM

## 2021-04-20 DIAGNOSIS — R26.89 BALANCE DISORDER: ICD-10-CM

## 2021-04-20 PROCEDURE — 97110 THERAPEUTIC EXERCISES: CPT | Mod: PN

## 2021-04-22 ENCOUNTER — CLINICAL SUPPORT (OUTPATIENT)
Dept: REHABILITATION | Facility: HOSPITAL | Age: 51
End: 2021-04-22
Payer: MEDICAID

## 2021-04-22 ENCOUNTER — IMMUNIZATION (OUTPATIENT)
Dept: PRIMARY CARE CLINIC | Facility: CLINIC | Age: 51
End: 2021-04-22
Payer: MEDICAID

## 2021-04-22 DIAGNOSIS — Z23 NEED FOR VACCINATION: Primary | ICD-10-CM

## 2021-04-22 DIAGNOSIS — R29.898 WEAKNESS OF LEFT LOWER EXTREMITY: ICD-10-CM

## 2021-04-22 DIAGNOSIS — R26.89 BALANCE DISORDER: ICD-10-CM

## 2021-04-22 DIAGNOSIS — R26.81 GAIT INSTABILITY: ICD-10-CM

## 2021-04-22 PROCEDURE — 91300 COVID-19, MRNA, LNP-S, PF, 30 MCG/0.3 ML DOSE VACCINE: ICD-10-PCS | Mod: S$GLB,,, | Performed by: FAMILY MEDICINE

## 2021-04-22 PROCEDURE — 91300 COVID-19, MRNA, LNP-S, PF, 30 MCG/0.3 ML DOSE VACCINE: CPT | Mod: S$GLB,,, | Performed by: FAMILY MEDICINE

## 2021-04-22 PROCEDURE — 0002A COVID-19, MRNA, LNP-S, PF, 30 MCG/0.3 ML DOSE VACCINE: CPT | Mod: CV19,S$GLB,, | Performed by: FAMILY MEDICINE

## 2021-04-22 PROCEDURE — 97110 THERAPEUTIC EXERCISES: CPT | Mod: PN

## 2021-04-22 PROCEDURE — 0002A COVID-19, MRNA, LNP-S, PF, 30 MCG/0.3 ML DOSE VACCINE: ICD-10-PCS | Mod: CV19,S$GLB,, | Performed by: FAMILY MEDICINE

## 2021-05-11 ENCOUNTER — OFFICE VISIT (OUTPATIENT)
Dept: FAMILY MEDICINE | Facility: CLINIC | Age: 51
End: 2021-05-11
Payer: MEDICAID

## 2021-05-11 VITALS
SYSTOLIC BLOOD PRESSURE: 120 MMHG | WEIGHT: 217 LBS | HEIGHT: 73 IN | DIASTOLIC BLOOD PRESSURE: 80 MMHG | OXYGEN SATURATION: 96 % | TEMPERATURE: 98 F | BODY MASS INDEX: 28.76 KG/M2 | HEART RATE: 89 BPM

## 2021-05-11 DIAGNOSIS — R73.03 PREDIABETES: Primary | ICD-10-CM

## 2021-05-11 DIAGNOSIS — E78.00 ELEVATED LDL CHOLESTEROL LEVEL: ICD-10-CM

## 2021-05-11 PROCEDURE — 99213 OFFICE O/P EST LOW 20 MIN: CPT | Mod: S$GLB,,, | Performed by: NURSE PRACTITIONER

## 2021-05-11 PROCEDURE — 99213 PR OFFICE/OUTPT VISIT, EST, LEVL III, 20-29 MIN: ICD-10-PCS | Mod: S$GLB,,, | Performed by: NURSE PRACTITIONER

## 2021-05-25 DIAGNOSIS — I63.9 ISCHEMIC STROKE: Primary | ICD-10-CM

## 2021-06-01 ENCOUNTER — HOSPITAL ENCOUNTER (OUTPATIENT)
Dept: RADIOLOGY | Facility: HOSPITAL | Age: 51
Discharge: HOME | End: 2021-06-01
Attending: PSYCHIATRY & NEUROLOGY
Payer: MEDICAID

## 2021-06-01 DIAGNOSIS — I63.9 ISCHEMIC STROKE: ICD-10-CM

## 2021-06-01 PROCEDURE — 70551 MRI BRAIN STEM W/O DYE: CPT | Mod: TC,PO

## 2021-06-02 ENCOUNTER — TELEPHONE (OUTPATIENT)
Dept: FAMILY MEDICINE | Facility: CLINIC | Age: 51
End: 2021-06-02

## 2021-08-26 ENCOUNTER — LAB VISIT (OUTPATIENT)
Dept: PRIMARY CARE CLINIC | Facility: OTHER | Age: 51
End: 2021-08-26
Payer: MEDICAID

## 2021-08-26 DIAGNOSIS — Z20.822 ENCOUNTER FOR LABORATORY TESTING FOR COVID-19 VIRUS: ICD-10-CM

## 2021-08-26 PROCEDURE — U0003 INFECTIOUS AGENT DETECTION BY NUCLEIC ACID (DNA OR RNA); SEVERE ACUTE RESPIRATORY SYNDROME CORONAVIRUS 2 (SARS-COV-2) (CORONAVIRUS DISEASE [COVID-19]), AMPLIFIED PROBE TECHNIQUE, MAKING USE OF HIGH THROUGHPUT TECHNOLOGIES AS DESCRIBED BY CMS-2020-01-R: HCPCS

## 2021-08-28 LAB
SARS-COV-2 RNA RESP QL NAA+PROBE: NORMAL
TEST PERFORMANCE INFO SPEC: NORMAL

## 2021-11-29 ENCOUNTER — OFFICE VISIT (OUTPATIENT)
Dept: FAMILY MEDICINE | Facility: CLINIC | Age: 51
End: 2021-11-29
Payer: MEDICAID

## 2021-11-29 VITALS
SYSTOLIC BLOOD PRESSURE: 120 MMHG | HEART RATE: 98 BPM | WEIGHT: 214 LBS | BODY MASS INDEX: 28.36 KG/M2 | HEIGHT: 73 IN | OXYGEN SATURATION: 96 % | DIASTOLIC BLOOD PRESSURE: 80 MMHG | TEMPERATURE: 98 F

## 2021-11-29 DIAGNOSIS — R73.03 PREDIABETES: Primary | ICD-10-CM

## 2021-11-29 DIAGNOSIS — E78.00 ELEVATED LDL CHOLESTEROL LEVEL: ICD-10-CM

## 2021-11-29 DIAGNOSIS — Z12.11 COLON CANCER SCREENING: ICD-10-CM

## 2021-11-29 PROCEDURE — 99213 OFFICE O/P EST LOW 20 MIN: CPT | Mod: S$GLB,,, | Performed by: NURSE PRACTITIONER

## 2021-11-29 PROCEDURE — 99213 PR OFFICE/OUTPT VISIT, EST, LEVL III, 20-29 MIN: ICD-10-PCS | Mod: S$GLB,,, | Performed by: NURSE PRACTITIONER

## 2021-11-29 RX ORDER — ONABOTULINUMTOXINA 100 [USP'U]/1
INJECTION, POWDER, LYOPHILIZED, FOR SOLUTION INTRADERMAL; INTRAMUSCULAR
COMMUNITY
Start: 2021-11-05 | End: 2022-05-31

## 2021-11-29 RX ORDER — BACLOFEN 5 MG/1
TABLET ORAL
COMMUNITY
Start: 2021-10-30 | End: 2022-05-31

## 2022-01-11 ENCOUNTER — OFFICE VISIT (OUTPATIENT)
Dept: URGENT CARE | Facility: CLINIC | Age: 52
End: 2022-01-11
Payer: MEDICAID

## 2022-01-11 VITALS
HEART RATE: 109 BPM | TEMPERATURE: 98 F | DIASTOLIC BLOOD PRESSURE: 74 MMHG | OXYGEN SATURATION: 95 % | BODY MASS INDEX: 28.36 KG/M2 | RESPIRATION RATE: 18 BRPM | WEIGHT: 214 LBS | HEIGHT: 73 IN | SYSTOLIC BLOOD PRESSURE: 113 MMHG

## 2022-01-11 DIAGNOSIS — R52 BODY ACHES: ICD-10-CM

## 2022-01-11 DIAGNOSIS — U07.1 COVID-19 VIRUS DETECTED: ICD-10-CM

## 2022-01-11 DIAGNOSIS — R09.81 NASAL SINUS CONGESTION: Primary | ICD-10-CM

## 2022-01-11 LAB
CTP QC/QA: YES
SARS-COV-2 AG RESP QL IA.RAPID: POSITIVE

## 2022-01-11 PROCEDURE — 3008F PR BODY MASS INDEX (BMI) DOCUMENTED: ICD-10-PCS | Mod: CPTII,S$GLB,, | Performed by: NURSE PRACTITIONER

## 2022-01-11 PROCEDURE — 3008F BODY MASS INDEX DOCD: CPT | Mod: CPTII,S$GLB,, | Performed by: NURSE PRACTITIONER

## 2022-01-11 PROCEDURE — 87811 SARS-COV-2 COVID19 W/OPTIC: CPT | Mod: S$GLB,,, | Performed by: NURSE PRACTITIONER

## 2022-01-11 PROCEDURE — 3074F PR MOST RECENT SYSTOLIC BLOOD PRESSURE < 130 MM HG: ICD-10-PCS | Mod: CPTII,S$GLB,, | Performed by: NURSE PRACTITIONER

## 2022-01-11 PROCEDURE — 1159F PR MEDICATION LIST DOCUMENTED IN MEDICAL RECORD: ICD-10-PCS | Mod: CPTII,S$GLB,, | Performed by: NURSE PRACTITIONER

## 2022-01-11 PROCEDURE — 99213 PR OFFICE/OUTPT VISIT, EST, LEVL III, 20-29 MIN: ICD-10-PCS | Mod: S$GLB,,, | Performed by: NURSE PRACTITIONER

## 2022-01-11 PROCEDURE — 3074F SYST BP LT 130 MM HG: CPT | Mod: CPTII,S$GLB,, | Performed by: NURSE PRACTITIONER

## 2022-01-11 PROCEDURE — 1159F MED LIST DOCD IN RCRD: CPT | Mod: CPTII,S$GLB,, | Performed by: NURSE PRACTITIONER

## 2022-01-11 PROCEDURE — 3078F PR MOST RECENT DIASTOLIC BLOOD PRESSURE < 80 MM HG: ICD-10-PCS | Mod: CPTII,S$GLB,, | Performed by: NURSE PRACTITIONER

## 2022-01-11 PROCEDURE — 3078F DIAST BP <80 MM HG: CPT | Mod: CPTII,S$GLB,, | Performed by: NURSE PRACTITIONER

## 2022-01-11 PROCEDURE — 1160F PR REVIEW ALL MEDS BY PRESCRIBER/CLIN PHARMACIST DOCUMENTED: ICD-10-PCS | Mod: CPTII,S$GLB,, | Performed by: NURSE PRACTITIONER

## 2022-01-11 PROCEDURE — 1160F RVW MEDS BY RX/DR IN RCRD: CPT | Mod: CPTII,S$GLB,, | Performed by: NURSE PRACTITIONER

## 2022-01-11 PROCEDURE — 87811 SARS CORONAVIRUS 2 ANTIGEN POCT, MANUAL READ: ICD-10-PCS | Mod: S$GLB,,, | Performed by: NURSE PRACTITIONER

## 2022-01-11 PROCEDURE — 99213 OFFICE O/P EST LOW 20 MIN: CPT | Mod: S$GLB,,, | Performed by: NURSE PRACTITIONER

## 2022-01-11 RX ORDER — BENZONATATE 100 MG/1
100 CAPSULE ORAL 3 TIMES DAILY PRN
Qty: 30 CAPSULE | Refills: 0 | Status: SHIPPED | OUTPATIENT
Start: 2022-01-11 | End: 2022-01-21

## 2022-01-11 RX ORDER — CETIRIZINE HYDROCHLORIDE 10 MG/1
10 TABLET ORAL DAILY
Qty: 30 TABLET | Refills: 0 | Status: SHIPPED | OUTPATIENT
Start: 2022-01-11 | End: 2022-05-31

## 2022-01-11 RX ORDER — ALBUTEROL SULFATE 90 UG/1
2 AEROSOL, METERED RESPIRATORY (INHALATION) EVERY 6 HOURS PRN
Qty: 18 G | Refills: 0 | Status: SHIPPED | OUTPATIENT
Start: 2022-01-11 | End: 2022-05-31

## 2022-01-11 RX ORDER — FLUTICASONE PROPIONATE 50 MCG
1 SPRAY, SUSPENSION (ML) NASAL DAILY
Qty: 15.8 ML | Refills: 0 | Status: SHIPPED | OUTPATIENT
Start: 2022-01-11 | End: 2022-05-31

## 2022-01-11 RX ORDER — PROMETHAZINE HYDROCHLORIDE AND DEXTROMETHORPHAN HYDROBROMIDE 6.25; 15 MG/5ML; MG/5ML
5 SYRUP ORAL EVERY 4 HOURS PRN
Qty: 118 ML | Refills: 0 | Status: SHIPPED | OUTPATIENT
Start: 2022-01-11 | End: 2022-01-21

## 2022-01-11 NOTE — PROGRESS NOTES
"Subjective:       Patient ID: Jaime Mendoza is a 51 y.o. male.    Vitals:  height is 6' 1" (1.854 m) and weight is 97.1 kg (214 lb). His oral temperature is 98.3 °F (36.8 °C). His blood pressure is 113/74 and his pulse is 109. His respiration is 18 and oxygen saturation is 95%.     Chief Complaint: COVID-19 Concerns    Pt states "he has been having chills, congestion, and body aches. He has been exposed at work and at home. His symtpoms have been going on for 2 days."      Constitution: Positive for chills and fatigue. Negative for fever.   HENT: Positive for congestion. Negative for sore throat.    Respiratory: Positive for cough. Negative for chest tightness and shortness of breath.    Gastrointestinal: Negative for nausea, vomiting and diarrhea.       Objective:      Physical Exam   Constitutional: He is oriented to person, place, and time. He appears well-developed.  Non-toxic appearance. He does not appear ill. No distress.   HENT:   Head: Normocephalic and atraumatic.   Ears:   Right Ear: Tympanic membrane normal.   Left Ear: Tympanic membrane normal.   Nose: Rhinorrhea and congestion present.   Mouth/Throat: Mucous membranes are moist. Posterior oropharyngeal erythema present. No oropharyngeal exudate.   Eyes: Conjunctivae and EOM are normal. Right eye exhibits no discharge. Left eye exhibits no discharge.   Neck: Neck supple. No neck rigidity present.   Cardiovascular: Normal rate, regular rhythm and normal heart sounds.   Pulmonary/Chest: Effort normal and breath sounds normal. No respiratory distress.   Abdominal: Normal appearance.   Musculoskeletal: Normal range of motion.         General: Normal range of motion.      Cervical back: He exhibits no tenderness.   Lymphadenopathy:     He has no cervical adenopathy.   Neurological: He is alert and oriented to person, place, and time.   Skin: Skin is warm, dry and not diaphoretic. Capillary refill takes 2 to 3 seconds.   Psychiatric: His behavior is normal. " Mood normal.   Nursing note and vitals reviewed.        Assessment:       1. Nasal sinus congestion    2. Body aches    3. COVID-19 virus detected          Plan:         Nasal sinus congestion  -     SARS Coronavirus 2 Antigen, POCT Manual Read    Body aches  -     SARS Coronavirus 2 Antigen, POCT Manual Read    COVID-19 virus detected  -     albuterol (VENTOLIN HFA) 90 mcg/actuation inhaler; Inhale 2 puffs into the lungs every 6 (six) hours as needed for Wheezing. Rescue  Dispense: 18 g; Refill: 0  -     fluticasone propionate (FLONASE) 50 mcg/actuation nasal spray; 1 spray (50 mcg total) by Each Nostril route once daily.  Dispense: 15.8 mL; Refill: 0  -     cetirizine (ZYRTEC) 10 MG tablet; Take 1 tablet (10 mg total) by mouth once daily.  Dispense: 30 tablet; Refill: 0  -     benzonatate (TESSALON) 100 MG capsule; Take 1 capsule (100 mg total) by mouth 3 (three) times daily as needed for Cough.  Dispense: 30 capsule; Refill: 0  -     promethazine-dextromethorphan (PROMETHAZINE-DM) 6.25-15 mg/5 mL Syrp; Take 5 mLs by mouth every 4 (four) hours as needed (cough).  Dispense: 118 mL; Refill: 0    Symptomatic treatment to include:    Rest, increase fluid intake to include electrolyte replacement  Ibuprofen/Tylenol as directed for fever, sore throat, body aches  Zrytec and flonase for sinus symptoms  Phenergan cough syrup at night for cough  Tessalon perles cough pills as needed day or night  Mucinex D over the counter as directed for sinus congestion.  Coricidin HBP if you have high blood pressure.  Warm, salt water gargles, over the counter throat lozenges or sprays as desires.   Imodium over the counter as directed for diarrhea.  ER for difficulty breathing not relieved by rest, excessive lethargy and/or change in mental status  Albuterol inhaler (if prescribed) for chest tightness, shortness or breath, wheezing, or tight/wheezing cough especially when brought on with deep breath.  Follow CDC isolation guidelines as  provided  .  Patient Instructions   POSITIVE COVID TEST      You have tested positive for COVID-19 today.  Please note that patients who test positive for COVID-19 are required by the CDC to undergo isolation for 5 days, then wearing a mask around others for an additional 5 days, after their symptoms first began following the new updated guidelines of 12/27/2021. This isolation starts from the day you first developed symptoms, not the day of your positive test. For example, if your symptoms began on a Monday but tested positive on the following Wednesday, your 5-day isolation begins from that Monday, not the Wednesday you tested positive.  However, if you are asymptomatic (a person who does not have any symptoms) and COVID-19 positive, your 5-day isolation begins on the day you tested positive, regardless of exposure date.  Also, per the CDC guidelines, once your 5 days have passed, symptoms have resolved or are improving, and you have not had fever greater than 100.4F in the last 24 hours without taking any fever reducers such as Tylenol (Acetaminophen) or Motrin (Ibuprofen), you may return to your normal activities including social distancing, wearing masks, and frequent handwashing - YOU DO NOT NEED ANOTHER TEST IN ORDER TO END YOUR QUARANTINE.

## 2022-01-11 NOTE — LETTER
January 11, 2022      [facility]  [address]  [facility] [address]  Phone: [phone]       Patient: Jaime Mendoza   YOB: 1970  Date of Visit: 01/11/2022    To Whom It May Concern:    Delgado Mendoza  was at [facility] on 01/11/2022. The patient may return to work/school on 01/14/2022 as long as symptoms are improving and no fever for 24 hours.  If you have any questions or concerns, or if I can be of further assistance, please do not hesitate to contact me.    Sincerely,    Gina Tay, NP

## 2022-01-11 NOTE — PATIENT INSTRUCTIONS
Symptomatic treatment to include:    Rest, increase fluid intake to include electrolyte replacement  Ibuprofen/Tylenol as directed for fever, sore throat, body aches  Zrytec and flonase for sinus symptoms  Phenergan cough syrup at night for cough  Tessalon perles cough pills as needed day or night  Mucinex D over the counter as directed for sinus congestion.  Coricidin HBP if you have high blood pressure.  Warm, salt water gargles, over the counter throat lozenges or sprays as desires.   Imodium over the counter as directed for diarrhea.  ER for difficulty breathing not relieved by rest, excessive lethargy and/or change in mental status  Albuterol inhaler (if prescribed) for chest tightness, shortness or breath, wheezing, or tight/wheezing cough especially when brought on with deep breath.  Follow CDC isolation guidelines as provided  .  Patient Instructions   POSITIVE COVID TEST      You have tested positive for COVID-19 today.  Please note that patients who test positive for COVID-19 are required by the CDC to undergo isolation for 5 days, then wearing a mask around others for an additional 5 days, after their symptoms first began following the new updated guidelines of 12/27/2021. This isolation starts from the day you first developed symptoms, not the day of your positive test. For example, if your symptoms began on a Monday but tested positive on the following Wednesday, your 5-day isolation begins from that Monday, not the Wednesday you tested positive.  However, if you are asymptomatic (a person who does not have any symptoms) and COVID-19 positive, your 5-day isolation begins on the day you tested positive, regardless of exposure date.  Also, per the CDC guidelines, once your 5 days have passed, symptoms have resolved or are improving, and you have not had fever greater than 100.4F in the last 24 hours without taking any fever reducers such as Tylenol (Acetaminophen) or Motrin (Ibuprofen), you may return to  your normal activities including social distancing, wearing masks, and frequent handwashing - YOU DO NOT NEED ANOTHER TEST IN ORDER TO END YOUR QUARANTINE.    Patient Education       COVID-19 Discharge Instructions   About this topic   Coronavirus disease 2019 is also known as COVID-19. It is a viral illness that infects the lungs. It is caused by a virus called SARS-associated coronavirus (SARS-CoV-2).  The signs of COVID-19 most often start a few days after you have been infected. In some people, it takes longer to show signs. Others never show signs of the infection. You may have a cough, fever, shaking chills and it may be hard to breathe. You may be very tired, have muscle aches, a headache or sore throat. Some people have an upset stomach or loose stools. Others lose their sense of smell or taste. You may not have these signs all the time and they may come and go while you are sick.  The virus spreads easily through droplets when you talk, sneeze, or cough. You can pass the virus to others when you are talking close together, singing, hugging, sharing food, or shaking hands. Doctors believe the germs also survive on surfaces like tables, door handles, and telephones. However, this is not a common way that COVID-19 spreads. Doctors believe you can also spread the infection even if you dont have any symptoms, but they do not know how that happens. This is why getting vaccinated is one of the best ways to keep you healthy and slow the spread of the virus.  Some people have a mild case of COVID-19 and are able to stay at home and away from others until they feel better. Others may need to be in the hospital if they are very sick. Some people with COVID-19 can have some symptoms for weeks or months. People with COVID-19 must isolate themselves. You can start to be around others when your doctor says it is safe to do so.       What care is needed at home?   · Ask your doctor what you need to do when you go home.  Make sure you ask questions if you do not understand what the doctor says.  · Drink lots of water, juice, or broth to replace fluids lost from a fever.  · You may use cool mist humidifiers to help ease congestion and coughing.  · Use 2 to 3 pillows to prop yourself up when you lie down to make it easier to breathe and sleep.  · Do not smoke and do not drink beer, wine, and mixed drinks (alcohol).  · To lower the chance of passing the infection to others, get a COVID-19 vaccine after your infection has resolved.  · If you have not been fully vaccinated:  ? Wear a mask over your mouth and nose if you are around others who are not sick. Cloth masks work best if they have more than one layer of fabric.  ? Wash your hands often.  ? Stay home in a separate room, if possible, away from others. Only go out to get medical care.  ? Use a separate bathroom if possible.  ? Do not make food for others.  What follow-up care is needed?   · Your doctor may ask you to make visits to the office to check on your progress. Be sure to keep these visits. Make sure you wear a mask at these visits.  · If you can, tell the staff you have COVID-19 ahead of time so they can take extra care to stop the disease from spreading.  · It may take a few weeks before your health returns to normal.  What drugs may be needed?   The doctor may order drugs to:  · Help with breathing  · Help with fever  · Help with swelling in your airways and lungs  · Control coughing  · Ease a sore throat  · Help a runny or stuffy nose  Will physical activity be limited?   You may have to limit your physical activity. Talk to your doctor about the right amount of activity for you. If you have been very sick with COVID-19, it can take some time to get your strength back.  Will there be any other care needed?   Doctors do not know how long you can pass the virus on to others after you are sick. This is why it is important to stay in a separate room, if possible, when you  are sick. For now, doctors are giving general guidelines for you to follow after you have been sick. Before you go around other people, you should:  · Be fever free for 24 hours without taking any drugs to lower the fever  · Have no symptoms of cough or shortness of breath  · Wait at least 10 days after first having symptoms or your first positive test, and you need to be symptom free as above. Some experts suggest waiting 20 days if you have had a more severe infection.  Talk with your doctor about getting a COVID-19 vaccine.  What problems could happen?   · Fluid loss. This is dehydration.  · Short-term or long-term lung damage  · Heart problems  · Death  When do I need to call the doctor?   · You are having so much trouble breathing that you can only say one or two words at a time.  · You need to sit upright at all times to be able to breathe and/or cannot lie down.  · You are very confused or cannot stay awake.  · Your lips or skin start to turn blue or grey.  · You think you might be having a medical emergency. Some examples of medical emergencies are:  ? Severe chest pain.  ? Not able to speak or move normally.  · You have trouble breathing when talking or sitting still.  · You have new shortness of breath.  · You become weak or dizzy.  · You have very dark urine or do not pass urine for more than 8 hours.  · You have new or worsening COVID-19 symptoms like:  ? Fever  ? Cough  ? Feeling very tired  ? Shaking chills  ? Headache  ? Trouble swallowing  ? Throwing up  ? Loose stools  ? Reddish purple spots on your fingers or toes  Teach Back: Helping You Understand   The Teach Back Method helps you understand the information we are giving you. After you talk with the staff, tell them in your own words what you learned. This helps to make sure the staff has described each thing clearly. It also helps to explain things that may have been confusing. Before going home, make sure you can do these:  · I can tell you  about my condition.  · I can tell you what may help ease my breathing.  · I can tell you what I can do to help avoid passing the infection to others.  · I can tell you what I will do if I have trouble breathing; feel sleepy or confused; or my fingertips, fingernails, skin, or lips are blue.  Where can I learn more?   Centers for Disease Control and Prevention  https://www.cdc.gov/coronavirus/2019-ncov/about/index.html   Centers for Disease Control and Prevention  https://www.cdc.gov/coronavirus/2019-ncov/hcp/disposition-in-home-patients.html   World Health Organization  https://www.who.int/news-room/q-a-detail/l-a-yiyrzeyzxvnfi   Last Reviewed Date   2021-10-05  Consumer Information Use and Disclaimer   This information is not specific medical advice and does not replace information you receive from your health care provider. This is only a brief summary of general information. It does NOT include all information about conditions, illnesses, injuries, tests, procedures, treatments, therapies, discharge instructions or life-style choices that may apply to you. You must talk with your health care provider for complete information about your health and treatment options. This information should not be used to decide whether or not to accept your health care providers advice, instructions or recommendations. Only your health care provider has the knowledge and training to provide advice that is right for you.  Copyright   Copyright © 2021 [employer] and its affiliates and/or licensors. All rights reserved.

## 2022-05-17 ENCOUNTER — LAB VISIT (OUTPATIENT)
Dept: LAB | Facility: HOSPITAL | Age: 52
End: 2022-05-17
Attending: NURSE PRACTITIONER
Payer: MEDICAID

## 2022-05-17 DIAGNOSIS — R73.03 PREDIABETES: ICD-10-CM

## 2022-05-17 DIAGNOSIS — E78.00 ELEVATED LDL CHOLESTEROL LEVEL: ICD-10-CM

## 2022-05-17 LAB
ALBUMIN SERPL BCP-MCNC: 3.9 G/DL (ref 3.5–5.2)
ALP SERPL-CCNC: 100 U/L (ref 55–135)
ALT SERPL W/O P-5'-P-CCNC: 19 U/L (ref 10–44)
ANION GAP SERPL CALC-SCNC: 10 MMOL/L (ref 8–16)
AST SERPL-CCNC: 15 U/L (ref 10–40)
BILIRUB SERPL-MCNC: 0.6 MG/DL (ref 0.1–1)
BUN SERPL-MCNC: 16 MG/DL (ref 6–20)
CALCIUM SERPL-MCNC: 9 MG/DL (ref 8.7–10.5)
CHLORIDE SERPL-SCNC: 104 MMOL/L (ref 95–110)
CHOLEST SERPL-MCNC: 185 MG/DL (ref 120–199)
CHOLEST/HDLC SERPL: 5.1 {RATIO} (ref 2–5)
CO2 SERPL-SCNC: 24 MMOL/L (ref 23–29)
CREAT SERPL-MCNC: 0.9 MG/DL (ref 0.5–1.4)
EST. GFR  (AFRICAN AMERICAN): >60 ML/MIN/1.73 M^2
EST. GFR  (NON AFRICAN AMERICAN): >60 ML/MIN/1.73 M^2
ESTIMATED AVG GLUCOSE: 126 MG/DL (ref 68–131)
GLUCOSE SERPL-MCNC: 116 MG/DL (ref 70–110)
HBA1C MFR BLD: 6 % (ref 4–5.6)
HDLC SERPL-MCNC: 36 MG/DL (ref 40–75)
HDLC SERPL: 19.5 % (ref 20–50)
LDLC SERPL CALC-MCNC: 113 MG/DL (ref 63–159)
NONHDLC SERPL-MCNC: 149 MG/DL
POTASSIUM SERPL-SCNC: 4.1 MMOL/L (ref 3.5–5.1)
PROT SERPL-MCNC: 6.7 G/DL (ref 6–8.4)
SODIUM SERPL-SCNC: 138 MMOL/L (ref 136–145)
TRIGL SERPL-MCNC: 180 MG/DL (ref 30–150)

## 2022-05-17 PROCEDURE — 36415 COLL VENOUS BLD VENIPUNCTURE: CPT | Performed by: NURSE PRACTITIONER

## 2022-05-17 PROCEDURE — 83036 HEMOGLOBIN GLYCOSYLATED A1C: CPT | Performed by: NURSE PRACTITIONER

## 2022-05-17 PROCEDURE — 80053 COMPREHEN METABOLIC PANEL: CPT | Performed by: NURSE PRACTITIONER

## 2022-05-17 PROCEDURE — 80061 LIPID PANEL: CPT | Performed by: NURSE PRACTITIONER

## 2022-05-31 ENCOUNTER — OFFICE VISIT (OUTPATIENT)
Dept: FAMILY MEDICINE | Facility: CLINIC | Age: 52
End: 2022-05-31
Payer: MEDICAID

## 2022-05-31 VITALS
WEIGHT: 220 LBS | HEART RATE: 90 BPM | OXYGEN SATURATION: 95 % | SYSTOLIC BLOOD PRESSURE: 110 MMHG | HEIGHT: 73 IN | BODY MASS INDEX: 29.16 KG/M2 | DIASTOLIC BLOOD PRESSURE: 80 MMHG | TEMPERATURE: 98 F

## 2022-05-31 DIAGNOSIS — R73.03 PREDIABETES: Primary | ICD-10-CM

## 2022-05-31 DIAGNOSIS — Z12.5 SCREENING PSA (PROSTATE SPECIFIC ANTIGEN): ICD-10-CM

## 2022-05-31 DIAGNOSIS — E78.00 ELEVATED LDL CHOLESTEROL LEVEL: ICD-10-CM

## 2022-05-31 DIAGNOSIS — Z12.11 SCREEN FOR COLON CANCER: ICD-10-CM

## 2022-05-31 DIAGNOSIS — Z72.0 TOBACCO USE: ICD-10-CM

## 2022-05-31 PROCEDURE — 3008F PR BODY MASS INDEX (BMI) DOCUMENTED: ICD-10-PCS | Mod: CPTII,S$GLB,, | Performed by: NURSE PRACTITIONER

## 2022-05-31 PROCEDURE — 1160F RVW MEDS BY RX/DR IN RCRD: CPT | Mod: CPTII,S$GLB,, | Performed by: NURSE PRACTITIONER

## 2022-05-31 PROCEDURE — 3044F PR MOST RECENT HEMOGLOBIN A1C LEVEL <7.0%: ICD-10-PCS | Mod: CPTII,S$GLB,, | Performed by: NURSE PRACTITIONER

## 2022-05-31 PROCEDURE — 1159F MED LIST DOCD IN RCRD: CPT | Mod: CPTII,S$GLB,, | Performed by: NURSE PRACTITIONER

## 2022-05-31 PROCEDURE — 3008F BODY MASS INDEX DOCD: CPT | Mod: CPTII,S$GLB,, | Performed by: NURSE PRACTITIONER

## 2022-05-31 PROCEDURE — 1160F PR REVIEW ALL MEDS BY PRESCRIBER/CLIN PHARMACIST DOCUMENTED: ICD-10-PCS | Mod: CPTII,S$GLB,, | Performed by: NURSE PRACTITIONER

## 2022-05-31 PROCEDURE — 3079F DIAST BP 80-89 MM HG: CPT | Mod: CPTII,S$GLB,, | Performed by: NURSE PRACTITIONER

## 2022-05-31 PROCEDURE — 99214 PR OFFICE/OUTPT VISIT, EST, LEVL IV, 30-39 MIN: ICD-10-PCS | Mod: S$GLB,,, | Performed by: NURSE PRACTITIONER

## 2022-05-31 PROCEDURE — 3074F SYST BP LT 130 MM HG: CPT | Mod: CPTII,S$GLB,, | Performed by: NURSE PRACTITIONER

## 2022-05-31 PROCEDURE — 3044F HG A1C LEVEL LT 7.0%: CPT | Mod: CPTII,S$GLB,, | Performed by: NURSE PRACTITIONER

## 2022-05-31 PROCEDURE — 1159F PR MEDICATION LIST DOCUMENTED IN MEDICAL RECORD: ICD-10-PCS | Mod: CPTII,S$GLB,, | Performed by: NURSE PRACTITIONER

## 2022-05-31 PROCEDURE — 3079F PR MOST RECENT DIASTOLIC BLOOD PRESSURE 80-89 MM HG: ICD-10-PCS | Mod: CPTII,S$GLB,, | Performed by: NURSE PRACTITIONER

## 2022-05-31 PROCEDURE — 99214 OFFICE O/P EST MOD 30 MIN: CPT | Mod: S$GLB,,, | Performed by: NURSE PRACTITIONER

## 2022-05-31 PROCEDURE — 3074F PR MOST RECENT SYSTOLIC BLOOD PRESSURE < 130 MM HG: ICD-10-PCS | Mod: CPTII,S$GLB,, | Performed by: NURSE PRACTITIONER

## 2022-05-31 RX ORDER — ATORVASTATIN CALCIUM 10 MG/1
10 TABLET, FILM COATED ORAL DAILY
Qty: 90 TABLET | Refills: 0 | Status: SHIPPED | OUTPATIENT
Start: 2022-05-31 | End: 2022-09-06

## 2022-05-31 NOTE — PROGRESS NOTES
SUBJECTIVE:      Patient ID: Jaime Mendoza is a 52 y.o. male.    Chief Complaint: Hyperlipidemia    Mr Mendoza is here today to f/u on prediabetes and hyperlipidemia. He is following with [facility] for a previous traumatic brain injury. He is doing well today. A1c is improved on labs    Hyperlipidemia  The current episode started more than 1 year ago. The problem is controlled. Exacerbating diseases include diabetes. Pertinent negatives include no chest pain or shortness of breath. Current antihyperlipidemic treatment includes diet change and exercise. The current treatment provides mild improvement of lipids.   Diabetes  He presents for his follow-up (prediabetes) diabetic visit. His disease course has been stable. There are no hypoglycemic associated symptoms. Pertinent negatives for hypoglycemia include no confusion, dizziness, headaches, nervousness/anxiousness, pallor, seizures or speech difficulty. There are no diabetic associated symptoms. Pertinent negatives for diabetes include no chest pain, no polydipsia, no polyphagia, no polyuria and no weakness. There are no hypoglycemic complications. Symptoms are stable. There are no diabetic complications. Risk factors for coronary artery disease include diabetes mellitus, male sex and dyslipidemia. Current diabetic treatment includes diet. He is following a generally healthy diet. When asked about meal planning, he reported none.       Past Surgical History:   Procedure Laterality Date    EYE SURGERY      KNEE SURGERY      left arm fused  1987    TONSILLECTOMY       Family History   Problem Relation Age of Onset    Hypertension Father     Diabetes Father       Social History     Socioeconomic History    Marital status:    Tobacco Use    Smoking status: Current Every Day Smoker     Types: Cigars    Smokeless tobacco: Never Used   Substance and Sexual Activity    Alcohol use: No    Drug use: Not Currently     Comment: alcoholic   clean for 9 years    Sexual activity: Yes     Partners: Female     Social Determinants of Health     Financial Resource Strain: Low Risk     Difficulty of Paying Living Expenses: Not hard at all   Food Insecurity: No Food Insecurity    Worried About Running Out of Food in the Last Year: Never true    Ran Out of Food in the Last Year: Never true   Transportation Needs: No Transportation Needs    Lack of Transportation (Medical): No    Lack of Transportation (Non-Medical): No   Physical Activity: Inactive    Days of Exercise per Week: 0 days    Minutes of Exercise per Session: 0 min   Stress: No Stress Concern Present    Feeling of Stress : Not at all   Social Connections: Unknown    Frequency of Communication with Friends and Family: More than three times a week    Frequency of Social Gatherings with Friends and Family: More than three times a week    Active Member of Clubs or Organizations: Yes    Attends Club or Organization Meetings: More than 4 times per year    Marital Status:    Housing Stability: Low Risk     Unable to Pay for Housing in the Last Year: No    Number of Places Lived in the Last Year: 1    Unstable Housing in the Last Year: No     Current Outpatient Medications   Medication Sig Dispense Refill    atorvastatin (LIPITOR) 10 MG tablet Take 1 tablet (10 mg total) by mouth once daily. 90 tablet 0     Current Facility-Administered Medications   Medication Dose Route Frequency Provider Last Rate Last Admin    albuterol nebulizer solution 2.5 mg  2.5 mg Nebulization 1 time in Clinic/HOD         ipratropium 0.02 % nebulizer solution 0.5 mg  0.5 mg Nebulization 1 time in Clinic/HOD          Review of patient's allergies indicates:  No Known Allergies   Past Medical History:   Diagnosis Date    Alcoholic     Head injury, closed     Hyperglycemia      Past Surgical History:   Procedure Laterality Date    EYE SURGERY      KNEE SURGERY      left arm fused  1987     "TONSILLECTOMY         Review of Systems   Constitutional: Negative for activity change, appetite change, chills, diaphoresis and unexpected weight change.   HENT: Negative for ear discharge, facial swelling, hearing loss, nosebleeds, rhinorrhea and trouble swallowing.    Eyes: Negative for photophobia, pain, discharge and visual disturbance.   Respiratory: Negative for apnea, choking, chest tightness, shortness of breath and wheezing.    Cardiovascular: Negative for chest pain and palpitations.   Gastrointestinal: Negative for abdominal pain, blood in stool, constipation, diarrhea and vomiting.   Endocrine: Negative for polydipsia, polyphagia and polyuria.   Genitourinary: Negative for difficulty urinating, hematuria and urgency.   Musculoskeletal: Negative for arthralgias, gait problem, joint swelling and neck pain.   Skin: Negative for pallor.   Neurological: Negative for dizziness, seizures, speech difficulty, weakness and headaches.   Hematological: Does not bruise/bleed easily.   Psychiatric/Behavioral: Negative for agitation, confusion, dysphoric mood, self-injury, sleep disturbance and suicidal ideas. The patient is not nervous/anxious.       OBJECTIVE:      Vitals:    05/31/22 1421   BP: 110/80   Pulse: 90   Temp: 98.4 °F (36.9 °C)   SpO2: 95%   Weight: 99.8 kg (220 lb)   Height: 6' 1" (1.854 m)     Physical Exam  Vitals and nursing note reviewed.   Constitutional:       General: He is not in acute distress.     Appearance: He is well-developed.   HENT:      Head: Normocephalic and atraumatic.      Nose: Nose normal.      Mouth/Throat:      Pharynx: Uvula midline.   Eyes:      General: Lids are normal.      Conjunctiva/sclera: Conjunctivae normal.      Pupils: Pupils are equal, round, and reactive to light.      Right eye: Pupil is round and reactive.      Left eye: Pupil is round and reactive.   Neck:      Thyroid: No thyromegaly.      Vascular: No carotid bruit.   Cardiovascular:      Rate and Rhythm: " Normal rate and regular rhythm.      Pulses: Normal pulses.      Heart sounds: Normal heart sounds. No murmur heard.  Pulmonary:      Effort: Pulmonary effort is normal.      Breath sounds: Normal breath sounds. No wheezing, rhonchi or rales.   Abdominal:      General: Bowel sounds are normal.      Palpations: Abdomen is soft. Abdomen is not rigid.      Tenderness: There is no abdominal tenderness.   Musculoskeletal:         General: Normal range of motion.      Cervical back: Normal range of motion and neck supple.   Lymphadenopathy:      Cervical: No cervical adenopathy.   Skin:     General: Skin is warm and dry.      Nails: There is no clubbing.   Neurological:      Mental Status: He is alert and oriented to person, place, and time.   Psychiatric:         Mood and Affect: Mood normal.         Speech: Speech normal.         Behavior: Behavior normal. Behavior is cooperative.         Thought Content: Thought content normal.         Judgment: Judgment normal.         Lab Visit on 05/17/2022   Component Date Value Ref Range Status    Sodium 05/17/2022 138  136 - 145 mmol/L Final    Potassium 05/17/2022 4.1  3.5 - 5.1 mmol/L Final    Chloride 05/17/2022 104  95 - 110 mmol/L Final    CO2 05/17/2022 24  23 - 29 mmol/L Final    Glucose 05/17/2022 116 (A) 70 - 110 mg/dL Final    BUN 05/17/2022 16  6 - 20 mg/dL Final    Creatinine 05/17/2022 0.9  0.5 - 1.4 mg/dL Final    Calcium 05/17/2022 9.0  8.7 - 10.5 mg/dL Final    Total Protein 05/17/2022 6.7  6.0 - 8.4 g/dL Final    Albumin 05/17/2022 3.9  3.5 - 5.2 g/dL Final    Total Bilirubin 05/17/2022 0.6  0.1 - 1.0 mg/dL Final    Comment: For infants and newborns, interpretation of results should be based  on gestational age, weight and in agreement with clinical  observations.    Premature Infant recommended reference ranges:  Up to 24 hours.............<8.0 mg/dL  Up to 48 hours............<12.0 mg/dL  3-5 days..................<15.0 mg/dL  6-29  days.................<15.0 mg/dL      Alkaline Phosphatase 05/17/2022 100  55 - 135 U/L Final    AST 05/17/2022 15  10 - 40 U/L Final    ALT 05/17/2022 19  10 - 44 U/L Final    Anion Gap 05/17/2022 10  8 - 16 mmol/L Final    eGFR if African American 05/17/2022 >60  >60 mL/min/1.73 m^2 Final    eGFR if non African American 05/17/2022 >60  >60 mL/min/1.73 m^2 Final    Comment: Calculation used to obtain the estimated glomerular filtration  rate (eGFR) is the CKD-EPI equation.       Cholesterol 05/17/2022 185  120 - 199 mg/dL Final    Comment: The National Cholesterol Education Program (NCEP) has set the  following guidelines (reference ranges) for Cholesterol:  Optimal.....................<200 mg/dL  Borderline High.............200-239 mg/dL  High........................> or = 240 mg/dL      Triglycerides 05/17/2022 180 (A) 30 - 150 mg/dL Final    Comment: The National Cholesterol Education Program (NCEP) has set the  following guidelines (reference values) for triglycerides:  Normal......................<150 mg/dL  Borderline High.............150-199 mg/dL  High........................200-499 mg/dL      HDL 05/17/2022 36 (A) 40 - 75 mg/dL Final    Comment: The National Cholesterol Education Program (NCEP) has set the  following guidelines (reference values) for HDL Cholesterol:  Low...............<40 mg/dL  Optimal...........>60 mg/dL      LDL Cholesterol 05/17/2022 113.0  63.0 - 159.0 mg/dL Final    Comment: The National Cholesterol Education Program (NCEP) has set the  following guidelines (reference values) for LDL Cholesterol:  Optimal.......................<130 mg/dL  Borderline High...............130-159 mg/dL  High..........................160-189 mg/dL  Very High.....................>190 mg/dL      HDL/Cholesterol Ratio 05/17/2022 19.5 (A) 20.0 - 50.0 % Final    Total Cholesterol/HDL Ratio 05/17/2022 5.1 (A) 2.0 - 5.0 Final    Non-HDL Cholesterol 05/17/2022 149  mg/dL Final    Comment: Risk  category and Non-HDL cholesterol goals:  Coronary heart disease (CHD)or equivalent (10-year risk of CHD >20%):  Non-HDL cholesterol goal     <130 mg/dL  Two or more CHD risk factors and 10-year risk of CHD <= 20%:  Non-HDL cholesterol goal     <160 mg/dL  0 to 1 CHD risk factor:  Non-HDL cholesterol goal     <190 mg/dL      Hemoglobin A1C 05/17/2022 6.0 (A) 4.0 - 5.6 % Final    Comment: ADA Screening Guidelines:  5.7-6.4%  Consistent with prediabetes  >or=6.5%  Consistent with diabetes    High levels of fetal hemoglobin interfere with the HbA1C  assay. Heterozygous hemoglobin variants (HbS, HgC, etc)do  not significantly interfere with this assay.   However, presence of multiple variants may affect accuracy.      Estimated Avg Glucose 05/17/2022 126  68 - 131 mg/dL Final   ]  Assessment:       1. Prediabetes    2. Elevated LDL cholesterol level    3. Screen for colon cancer    4. Screening PSA (prostate specific antigen)    5. Tobacco use        Plan:       Prediabetes  -     Hemoglobin A1C; Future; Expected date: 06/14/2022  -     Microalbumin/Creatinine Ratio, Urine; Future; Expected date: 05/31/2022  Cont diet and exercise    Elevated LDL cholesterol level  -   start  atorvastatin (LIPITOR) 10 MG tablet; Take 1 tablet (10 mg total) by mouth once daily.  Dispense: 90 tablet; Refill: 0  -     Comprehensive Metabolic Panel; Future; Expected date: 06/14/2022  -     Lipid Panel; Future; Expected date: 06/14/2022  CV risk score 9.2% we discussed risk factors and medications for treatment of chol. Will start lipitor and repeat labs in 3-4 mo    Screen for colon cancer  -     Ambulatory referral/consult to Gastroenterology; Future; Expected date: 06/07/2022    Screening PSA (prostate specific antigen)  -     PSA, Screening; Future; Expected date: 06/14/2022    Tobacco use  Counseled on health risks related to tobacco use.  Discussed smoking cessation including Rx and non-Rx pharmacologic options and non pharmacologic  options.He is not ready to quit smoking      Follow up in about 4 months (around 9/30/2022) for hyperlipidemia .      5/31/2022 CARMEN Jeronimo, FNP

## 2022-10-01 ENCOUNTER — LAB VISIT (OUTPATIENT)
Dept: LAB | Facility: HOSPITAL | Age: 52
End: 2022-10-01
Attending: NURSE PRACTITIONER
Payer: MEDICAID

## 2022-10-01 DIAGNOSIS — Z12.5 SCREENING PSA (PROSTATE SPECIFIC ANTIGEN): ICD-10-CM

## 2022-10-01 DIAGNOSIS — R73.03 PREDIABETES: ICD-10-CM

## 2022-10-01 DIAGNOSIS — E78.00 ELEVATED LDL CHOLESTEROL LEVEL: ICD-10-CM

## 2022-10-01 LAB
ALBUMIN SERPL BCP-MCNC: 4.3 G/DL (ref 3.5–5.2)
ALP SERPL-CCNC: 137 U/L (ref 55–135)
ALT SERPL W/O P-5'-P-CCNC: 20 U/L (ref 10–44)
ANION GAP SERPL CALC-SCNC: 11 MMOL/L (ref 8–16)
AST SERPL-CCNC: 16 U/L (ref 10–40)
BILIRUB SERPL-MCNC: 0.6 MG/DL (ref 0.1–1)
BUN SERPL-MCNC: 17 MG/DL (ref 6–20)
CALCIUM SERPL-MCNC: 9.8 MG/DL (ref 8.7–10.5)
CHLORIDE SERPL-SCNC: 103 MMOL/L (ref 95–110)
CHOLEST SERPL-MCNC: 151 MG/DL (ref 120–199)
CHOLEST/HDLC SERPL: 4.3 {RATIO} (ref 2–5)
CO2 SERPL-SCNC: 26 MMOL/L (ref 23–29)
COMPLEXED PSA SERPL-MCNC: 0.45 NG/ML (ref 0–4)
CREAT SERPL-MCNC: 1 MG/DL (ref 0.5–1.4)
EST. GFR  (NO RACE VARIABLE): >60 ML/MIN/1.73 M^2
ESTIMATED AVG GLUCOSE: 128 MG/DL (ref 68–131)
GLUCOSE SERPL-MCNC: 114 MG/DL (ref 70–110)
HBA1C MFR BLD: 6.1 % (ref 4–5.6)
HDLC SERPL-MCNC: 35 MG/DL (ref 40–75)
HDLC SERPL: 23.2 % (ref 20–50)
LDLC SERPL CALC-MCNC: 91.2 MG/DL (ref 63–159)
NONHDLC SERPL-MCNC: 116 MG/DL
POTASSIUM SERPL-SCNC: 4.4 MMOL/L (ref 3.5–5.1)
PROT SERPL-MCNC: 6.8 G/DL (ref 6–8.4)
SODIUM SERPL-SCNC: 140 MMOL/L (ref 136–145)
TRIGL SERPL-MCNC: 124 MG/DL (ref 30–150)

## 2022-10-01 PROCEDURE — 36415 COLL VENOUS BLD VENIPUNCTURE: CPT | Mod: PO | Performed by: NURSE PRACTITIONER

## 2022-10-01 PROCEDURE — 80061 LIPID PANEL: CPT | Performed by: NURSE PRACTITIONER

## 2022-10-01 PROCEDURE — 83036 HEMOGLOBIN GLYCOSYLATED A1C: CPT | Performed by: NURSE PRACTITIONER

## 2022-10-01 PROCEDURE — 84153 ASSAY OF PSA TOTAL: CPT | Performed by: NURSE PRACTITIONER

## 2022-10-01 PROCEDURE — 80053 COMPREHEN METABOLIC PANEL: CPT | Performed by: NURSE PRACTITIONER

## 2022-10-03 ENCOUNTER — PATIENT MESSAGE (OUTPATIENT)
Dept: FAMILY MEDICINE | Facility: CLINIC | Age: 52
End: 2022-10-03

## 2022-10-04 ENCOUNTER — OFFICE VISIT (OUTPATIENT)
Dept: FAMILY MEDICINE | Facility: CLINIC | Age: 52
End: 2022-10-04
Payer: MEDICAID

## 2022-10-04 VITALS
HEIGHT: 73 IN | WEIGHT: 216 LBS | TEMPERATURE: 99 F | OXYGEN SATURATION: 97 % | SYSTOLIC BLOOD PRESSURE: 110 MMHG | HEART RATE: 93 BPM | DIASTOLIC BLOOD PRESSURE: 70 MMHG | BODY MASS INDEX: 28.63 KG/M2

## 2022-10-04 DIAGNOSIS — Z87.828 HISTORY OF HEAD INJURY: ICD-10-CM

## 2022-10-04 DIAGNOSIS — E78.00 ELEVATED LDL CHOLESTEROL LEVEL: ICD-10-CM

## 2022-10-04 DIAGNOSIS — R73.03 PREDIABETES: Primary | ICD-10-CM

## 2022-10-04 DIAGNOSIS — L98.9 SKIN LESION: ICD-10-CM

## 2022-10-04 PROCEDURE — 1160F PR REVIEW ALL MEDS BY PRESCRIBER/CLIN PHARMACIST DOCUMENTED: ICD-10-PCS | Mod: CPTII,S$GLB,, | Performed by: NURSE PRACTITIONER

## 2022-10-04 PROCEDURE — 1159F MED LIST DOCD IN RCRD: CPT | Mod: CPTII,S$GLB,, | Performed by: NURSE PRACTITIONER

## 2022-10-04 PROCEDURE — 3074F PR MOST RECENT SYSTOLIC BLOOD PRESSURE < 130 MM HG: ICD-10-PCS | Mod: CPTII,S$GLB,, | Performed by: NURSE PRACTITIONER

## 2022-10-04 PROCEDURE — 99214 OFFICE O/P EST MOD 30 MIN: CPT | Mod: S$GLB,,, | Performed by: NURSE PRACTITIONER

## 2022-10-04 PROCEDURE — 3078F DIAST BP <80 MM HG: CPT | Mod: CPTII,S$GLB,, | Performed by: NURSE PRACTITIONER

## 2022-10-04 PROCEDURE — 1159F PR MEDICATION LIST DOCUMENTED IN MEDICAL RECORD: ICD-10-PCS | Mod: CPTII,S$GLB,, | Performed by: NURSE PRACTITIONER

## 2022-10-04 PROCEDURE — 99214 PR OFFICE/OUTPT VISIT, EST, LEVL IV, 30-39 MIN: ICD-10-PCS | Mod: S$GLB,,, | Performed by: NURSE PRACTITIONER

## 2022-10-04 PROCEDURE — 3008F PR BODY MASS INDEX (BMI) DOCUMENTED: ICD-10-PCS | Mod: CPTII,S$GLB,, | Performed by: NURSE PRACTITIONER

## 2022-10-04 PROCEDURE — 3044F PR MOST RECENT HEMOGLOBIN A1C LEVEL <7.0%: ICD-10-PCS | Mod: CPTII,S$GLB,, | Performed by: NURSE PRACTITIONER

## 2022-10-04 PROCEDURE — 3044F HG A1C LEVEL LT 7.0%: CPT | Mod: CPTII,S$GLB,, | Performed by: NURSE PRACTITIONER

## 2022-10-04 PROCEDURE — 3008F BODY MASS INDEX DOCD: CPT | Mod: CPTII,S$GLB,, | Performed by: NURSE PRACTITIONER

## 2022-10-04 PROCEDURE — 3074F SYST BP LT 130 MM HG: CPT | Mod: CPTII,S$GLB,, | Performed by: NURSE PRACTITIONER

## 2022-10-04 PROCEDURE — 1160F RVW MEDS BY RX/DR IN RCRD: CPT | Mod: CPTII,S$GLB,, | Performed by: NURSE PRACTITIONER

## 2022-10-04 PROCEDURE — 3078F PR MOST RECENT DIASTOLIC BLOOD PRESSURE < 80 MM HG: ICD-10-PCS | Mod: CPTII,S$GLB,, | Performed by: NURSE PRACTITIONER

## 2022-10-04 RX ORDER — ATORVASTATIN CALCIUM 10 MG/1
10 TABLET, FILM COATED ORAL DAILY
Qty: 90 TABLET | Refills: 1 | Status: SHIPPED | OUTPATIENT
Start: 2022-10-04 | End: 2023-03-28 | Stop reason: SDUPTHER

## 2022-10-04 NOTE — PROGRESS NOTES
SUBJECTIVE:      Patient ID: Jaime Mendoza is a 52 y.o. male.    Chief Complaint: Hyperlipidemia    Mr Mendoza is here today to f/u on prediabetes and hyperlipidemia. He was started on lipitor at his previous ov. Tolerating well. Lipids improved     Hyperlipidemia  The current episode started more than 1 year ago. The problem is controlled. Exacerbating diseases include diabetes. Pertinent negatives include no chest pain or shortness of breath. Current antihyperlipidemic treatment includes diet change and exercise. The current treatment provides mild improvement of lipids.   Diabetes  He presents for his follow-up (prediabetes) diabetic visit. His disease course has been stable. There are no hypoglycemic associated symptoms. Pertinent negatives for hypoglycemia include no confusion, dizziness, headaches, nervousness/anxiousness, pallor, seizures or speech difficulty. There are no diabetic associated symptoms. Pertinent negatives for diabetes include no chest pain, no polydipsia, no polyphagia, no polyuria and no weakness. There are no hypoglycemic complications. Symptoms are stable. There are no diabetic complications. Risk factors for coronary artery disease include diabetes mellitus, male sex and dyslipidemia. Current diabetic treatment includes diet. He is following a generally healthy diet. When asked about meal planning, he reported none.     Past Surgical History:   Procedure Laterality Date    EYE SURGERY      KNEE SURGERY      left arm fused  1987    TONSILLECTOMY       Family History   Problem Relation Age of Onset    Hypertension Father     Diabetes Father       Social History     Socioeconomic History    Marital status:    Tobacco Use    Smoking status: Every Day     Types: Cigars    Smokeless tobacco: Never   Substance and Sexual Activity    Alcohol use: No    Drug use: Not Currently     Comment: alcoholic  clean for 9 years    Sexual activity: Yes     Partners: Female     Social Determinants of  Health     Financial Resource Strain: Low Risk     Difficulty of Paying Living Expenses: Not hard at all   Food Insecurity: No Food Insecurity    Worried About Running Out of Food in the Last Year: Never true    Ran Out of Food in the Last Year: Never true   Transportation Needs: No Transportation Needs    Lack of Transportation (Medical): No    Lack of Transportation (Non-Medical): No   Physical Activity: Inactive    Days of Exercise per Week: 0 days    Minutes of Exercise per Session: 0 min   Stress: No Stress Concern Present    Feeling of Stress : Not at all   Social Connections: Unknown    Frequency of Communication with Friends and Family: More than three times a week    Frequency of Social Gatherings with Friends and Family: Twice a week    Active Member of Clubs or Organizations: Yes    Attends Club or Organization Meetings: More than 4 times per year    Marital Status:    Housing Stability: Low Risk     Unable to Pay for Housing in the Last Year: No    Number of Places Lived in the Last Year: 1    Unstable Housing in the Last Year: No     Current Outpatient Medications   Medication Sig Dispense Refill    atorvastatin (LIPITOR) 10 MG tablet Take 1 tablet (10 mg total) by mouth once daily. 90 tablet 1     Current Facility-Administered Medications   Medication Dose Route Frequency Provider Last Rate Last Admin    albuterol nebulizer solution 2.5 mg  2.5 mg Nebulization 1 time in Clinic/HOD         ipratropium 0.02 % nebulizer solution 0.5 mg  0.5 mg Nebulization 1 time in Clinic/HOD          Review of patient's allergies indicates:  No Known Allergies   Past Medical History:   Diagnosis Date    Alcoholic     Head injury, closed     Hyperglycemia      Past Surgical History:   Procedure Laterality Date    EYE SURGERY      KNEE SURGERY      left arm fused  1987    TONSILLECTOMY         Review of Systems   Constitutional:  Negative for activity change, appetite change, chills, diaphoresis and unexpected  "weight change.   HENT:  Negative for ear discharge, facial swelling, hearing loss, nosebleeds, rhinorrhea and trouble swallowing.    Eyes:  Negative for photophobia, pain, discharge and visual disturbance.   Respiratory:  Negative for apnea, choking, chest tightness, shortness of breath and wheezing.    Cardiovascular:  Negative for chest pain and palpitations.   Gastrointestinal:  Negative for abdominal pain, blood in stool, constipation, diarrhea and vomiting.   Endocrine: Negative for polydipsia, polyphagia and polyuria.   Genitourinary:  Negative for difficulty urinating, hematuria and urgency.   Musculoskeletal:  Negative for arthralgias, gait problem, joint swelling and neck pain.   Skin:  Negative for pallor.   Neurological:  Negative for dizziness, seizures, speech difficulty, weakness and headaches.   Hematological:  Does not bruise/bleed easily.   Psychiatric/Behavioral:  Negative for agitation, confusion, dysphoric mood, self-injury, sleep disturbance and suicidal ideas. The patient is not nervous/anxious.     OBJECTIVE:      Vitals:    10/04/22 1422   BP: 110/70   Pulse: 93   Temp: 99 °F (37.2 °C)   SpO2: 97%   Weight: 98 kg (216 lb)   Height: 6' 1" (1.854 m)     Physical Exam  Vitals and nursing note reviewed.   Constitutional:       General: He is not in acute distress.     Appearance: He is well-developed.   HENT:      Head: Normocephalic and atraumatic.      Nose: Nose normal.      Mouth/Throat:      Pharynx: Uvula midline.   Eyes:      General: Lids are normal.      Conjunctiva/sclera: Conjunctivae normal.      Pupils: Pupils are equal, round, and reactive to light.      Right eye: Pupil is round and reactive.      Left eye: Pupil is round and reactive.   Neck:      Thyroid: No thyromegaly.      Vascular: No carotid bruit.   Cardiovascular:      Rate and Rhythm: Normal rate and regular rhythm.      Pulses: Normal pulses.      Heart sounds: Normal heart sounds.   Pulmonary:      Effort: Pulmonary " effort is normal.      Breath sounds: Normal breath sounds. No wheezing, rhonchi or rales.   Abdominal:      General: Bowel sounds are normal.      Palpations: Abdomen is soft. Abdomen is not rigid.      Tenderness: There is no abdominal tenderness.   Musculoskeletal:         General: Normal range of motion.      Cervical back: Normal range of motion and neck supple.   Lymphadenopathy:      Cervical: No cervical adenopathy.   Skin:     General: Skin is warm and dry.      Nails: There is no clubbing.          Neurological:      Mental Status: He is alert and oriented to person, place, and time.   Psychiatric:         Mood and Affect: Mood normal.         Speech: Speech normal.         Behavior: Behavior normal. Behavior is cooperative.         Thought Content: Thought content normal.      Lab Visit on 10/01/2022   Component Date Value Ref Range Status    Microalbumin, Urine 10/01/2022 16.0  ug/mL Final    Creatinine, Urine 10/01/2022 203.0  23.0 - 375.0 mg/dL Final    Microalb/Creat Ratio 10/01/2022 7.9  0.0 - 30.0 ug/mg Final   Lab Visit on 10/01/2022   Component Date Value Ref Range Status    Sodium 10/01/2022 140  136 - 145 mmol/L Final    Potassium 10/01/2022 4.4  3.5 - 5.1 mmol/L Final    Chloride 10/01/2022 103  95 - 110 mmol/L Final    CO2 10/01/2022 26  23 - 29 mmol/L Final    Glucose 10/01/2022 114 (H)  70 - 110 mg/dL Final    BUN 10/01/2022 17  6 - 20 mg/dL Final    Creatinine 10/01/2022 1.0  0.5 - 1.4 mg/dL Final    Calcium 10/01/2022 9.8  8.7 - 10.5 mg/dL Final    Total Protein 10/01/2022 6.8  6.0 - 8.4 g/dL Final    Albumin 10/01/2022 4.3  3.5 - 5.2 g/dL Final    Total Bilirubin 10/01/2022 0.6  0.1 - 1.0 mg/dL Final    Comment: For infants and newborns, interpretation of results should be based  on gestational age, weight and in agreement with clinical  observations.    Premature Infant recommended reference ranges:  Up to 24 hours.............<8.0 mg/dL  Up to 48 hours............<12.0 mg/dL  3-5  days..................<15.0 mg/dL  6-29 days.................<15.0 mg/dL      Alkaline Phosphatase 10/01/2022 137 (H)  55 - 135 U/L Final    AST 10/01/2022 16  10 - 40 U/L Final    ALT 10/01/2022 20  10 - 44 U/L Final    Anion Gap 10/01/2022 11  8 - 16 mmol/L Final    eGFR 10/01/2022 >60.0  >60 mL/min/1.73 m^2 Final    Cholesterol 10/01/2022 151  120 - 199 mg/dL Final    Comment: The National Cholesterol Education Program (NCEP) has set the  following guidelines (reference ranges) for Cholesterol:  Optimal.....................<200 mg/dL  Borderline High.............200-239 mg/dL  High........................> or = 240 mg/dL      Triglycerides 10/01/2022 124  30 - 150 mg/dL Final    Comment: The National Cholesterol Education Program (NCEP) has set the  following guidelines (reference values) for triglycerides:  Normal......................<150 mg/dL  Borderline High.............150-199 mg/dL  High........................200-499 mg/dL      HDL 10/01/2022 35 (L)  40 - 75 mg/dL Final    Comment: The National Cholesterol Education Program (NCEP) has set the  following guidelines (reference values) for HDL Cholesterol:  Low...............<40 mg/dL  Optimal...........>60 mg/dL      LDL Cholesterol 10/01/2022 91.2  63.0 - 159.0 mg/dL Final    Comment: The National Cholesterol Education Program (NCEP) has set the  following guidelines (reference values) for LDL Cholesterol:  Optimal.......................<130 mg/dL  Borderline High...............130-159 mg/dL  High..........................160-189 mg/dL  Very High.....................>190 mg/dL      HDL/Cholesterol Ratio 10/01/2022 23.2  20.0 - 50.0 % Final    Total Cholesterol/HDL Ratio 10/01/2022 4.3  2.0 - 5.0 Final    Non-HDL Cholesterol 10/01/2022 116  mg/dL Final    Comment: Risk category and Non-HDL cholesterol goals:  Coronary heart disease (CHD)or equivalent (10-year risk of CHD >20%):  Non-HDL cholesterol goal     <130 mg/dL  Two or more CHD risk factors and  10-year risk of CHD <= 20%:  Non-HDL cholesterol goal     <160 mg/dL  0 to 1 CHD risk factor:  Non-HDL cholesterol goal     <190 mg/dL      PSA, Screen 10/01/2022 0.45  0.00 - 4.00 ng/mL Final    Comment: The testing method is a chemiluminescent microparticle immunoassay   manufactured by [employer] and performed on the    or   [employer] system. Values obtained with different assay manufacturers   for   methods may be different and cannot be used interchangeably.  PSA Expected levels:  Hormonal Therapy: <0.05 ng/ml  Prostatectomy: <0.01 ng/ml  Radiation Therapy: <1.00 ng/ml      Hemoglobin A1C 10/01/2022 6.1 (H)  4.0 - 5.6 % Final    Comment: ADA Screening Guidelines:  5.7-6.4%  Consistent with prediabetes  >or=6.5%  Consistent with diabetes    High levels of fetal hemoglobin interfere with the HbA1C  assay. Heterozygous hemoglobin variants (HbS, HgC, etc)do  not significantly interfere with this assay.   However, presence of multiple variants may affect accuracy.      Estimated Avg Glucose 10/01/2022 128  68 - 131 mg/dL Final   ]    Assessment:       1. Prediabetes    2. Elevated LDL cholesterol level    3. History of head injury    4. Skin lesion        Plan:       Prediabetes  Stable with diet and exercise    Elevated LDL cholesterol level  -     atorvastatin (LIPITOR) 10 MG tablet; Take 1 tablet (10 mg total) by mouth once daily.  Dispense: 90 tablet; Refill: 1    History of head injury  -     Ambulatory referral/consult to Neurology; Future; Expected date: 10/11/2022    Skin lesion  -     Ambulatory referral/consult to Dermatology; Future; Expected date: 10/11/2022      Follow up in about 6 months (around 4/4/2023) for hyperlipidemia.      10/4/2022 CARMEN Jeronimo, FNP

## 2022-10-11 DIAGNOSIS — R29.898 LEFT LEG WEAKNESS: Primary | ICD-10-CM

## 2022-11-08 ENCOUNTER — CLINICAL SUPPORT (OUTPATIENT)
Dept: REHABILITATION | Facility: HOSPITAL | Age: 52
End: 2022-11-08
Payer: MEDICAID

## 2022-11-08 DIAGNOSIS — R26.81 GAIT INSTABILITY: Primary | ICD-10-CM

## 2022-11-08 DIAGNOSIS — Z74.09 IMPAIRED FUNCTIONAL MOBILITY AND ACTIVITY TOLERANCE: ICD-10-CM

## 2022-11-08 DIAGNOSIS — R26.89 BALANCE DISORDER: ICD-10-CM

## 2022-11-08 PROCEDURE — 97162 PT EVAL MOD COMPLEX 30 MIN: CPT | Mod: PN

## 2022-11-09 PROBLEM — Z74.09 IMPAIRED FUNCTIONAL MOBILITY AND ACTIVITY TOLERANCE: Status: ACTIVE | Noted: 2022-11-09
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
## 2022-11-16 NOTE — PROGRESS NOTES
"  Physical Therapy Daily Treatment Note     Name: Jaime Mendoza  Clinic Number: [id number]    Therapy Diagnosis:   Encounter Diagnoses   Name Primary?    Gait instability Yes    Balance disorder     Impaired functional mobility and activity tolerance      Physician: Cathy Quesada NP    Visit Date: 11/17/2022    Physician Orders: PT Eval and Treat  Medical Diagnosis from Referral: R29.898 (ICD-10-CM) - Left leg weakness  Evaluation Date: 11/8/2022  Authorization Period Expiration: 12/31/22  Plan of Care Expiration: 12/15/2022                          Progress Update: 12/8/2022               Visit # / Visits authorized: 1 / 10    Time In: 5:15 pm   Time Out: 6:00 pm   Total Billable Time: 45 minutes    Precautions: Standard and Fall    PTA visit 0/5    Progress note due 12/8/22      Subjective     Pt reports: " my left leg still drags"     He was not yet given an HEP.   Response to previous treatment: good  Functional change: ongoing       Pain: 0/10  Location: NA    Objective     Jaime received therapeutic exercises to develop strength, endurance, and ROM for 37 minutes including:  Sci fit Level 3 10 min bilateral upper extremity/ lower extremity  - 4 way ankle exercises L lower extremity    HEP made and went over with pt ( see below)      Jaime participated in gait training to improve functional mobility and safety for 8  minutes, including:  Ambulated 150 ft supervision. L toe dragging, L knee hyperextending.     Home Exercises Provided and Patient Education Provided     Education provided:   - HEP handout given and went over with pt. Pt reports understanding.    Written Home Exercises Provided: yes.  Exercises were reviewed and Jaime was able to demonstrate them prior to the end of the session.  Jaime demonstrated good  understanding of the education provided.     See EMR under Patient Instructions for exercises provided 11/17/2022.    Assessment     Tolerated session well. Focused on HEP and education for pt " about prognosis and what we can work on. Pt motivated to improve.      Jaime Is progressing well towards his goals.   Pt prognosis is Fair.     Pt will continue to benefit from skilled outpatient physical therapy to address the deficits listed in the problem list box on initial evaluation, provide pt/family education and to maximize pt's level of independence in the home and community environment.     Pt's spiritual, cultural and educational needs considered and pt agreeable to plan of care and goals.     Anticipated barriers to physical therapy: chronic TBI symptoms.     GOALS:  SHORT TERM GOALS: 2 weeks, () Last assessed Progress   Recent signs and systems trend is improving in order to progress towards LTG's. 11/8/22 ongoing    Patient will be independent with HEP in order to further progress and return to maximal function. 11/8/22 ongoing    Pain rating at Worst:2/10 in order to progress towards increased independence with activity. 11/8/22 ongoing    Patient will be able to correct postural deviations in sitting and standing, to decrease pain and promote postural awareness for injury prevention.  11/8/22 ongoing         LONG TERM GOALS: 4 weeks, () Last assessed Progress   Patient will return to normal ADL, recreational, and work related activities with less pain and limitation.  11/8/22 ongoing    Patient will improve AROM to stated goals in order to return to maximal functional potential.  11/8/22 ongoing    Patient will improve Strength to stated goals of appropriate musculature in order to improve functional independence.  11/8/22 ongoing    Pain Rating at Best: 1/10 to improve Quality of Life.  11/8/22  ongoing   Patient will meet predicted functional outcome (FOTO) score: 27% to increase self-worth & perceived functional ability. 11/8/22  ongoing   Patient will have met/partially met personal goal of: walk with less toe drag 11/8/22  ongoing        Plan   Plan of care Certification: 11/8/2022 to  12/15/2022.     Outpatient Physical Therapy 2 times weekly for 4 weeks to include any combination of the following interventions: virtual visits, dry needling, modalities, electrical stimulation (IFC, Pre-Mod, Attended with Functional Dry Needling), Manual Therapy, Moist Heat/ Ice, Neuromuscular Re-ed, Patient Education, Self Care, Therapeutic Exercise, Functional Training, and Therapeutic Activites     Recommendations for next treatment session:     Reassgabriel Herrera and WILLIAM Rios, PT

## 2022-11-17 ENCOUNTER — CLINICAL SUPPORT (OUTPATIENT)
Dept: REHABILITATION | Facility: HOSPITAL | Age: 52
End: 2022-11-17
Payer: MEDICAID

## 2022-11-17 DIAGNOSIS — Z74.09 IMPAIRED FUNCTIONAL MOBILITY AND ACTIVITY TOLERANCE: ICD-10-CM

## 2022-11-17 DIAGNOSIS — R26.81 GAIT INSTABILITY: Primary | ICD-10-CM

## 2022-11-17 DIAGNOSIS — R26.89 BALANCE DISORDER: ICD-10-CM

## 2022-11-17 PROCEDURE — 97110 THERAPEUTIC EXERCISES: CPT | Mod: PN

## 2022-11-29 ENCOUNTER — CLINICAL SUPPORT (OUTPATIENT)
Dept: REHABILITATION | Facility: HOSPITAL | Age: 52
End: 2022-11-29
Payer: MEDICAID

## 2022-11-29 DIAGNOSIS — R26.81 GAIT INSTABILITY: Primary | ICD-10-CM

## 2022-11-29 DIAGNOSIS — Z74.09 IMPAIRED FUNCTIONAL MOBILITY AND ACTIVITY TOLERANCE: ICD-10-CM

## 2022-11-29 DIAGNOSIS — R26.89 BALANCE DISORDER: ICD-10-CM

## 2022-11-29 PROCEDURE — 97110 THERAPEUTIC EXERCISES: CPT | Mod: PN

## 2022-11-29 NOTE — PROGRESS NOTES
"  Physical Therapy Daily Treatment Note     Name: Jaime Mendoza  Clinic Number: [id number]    Therapy Diagnosis:   Encounter Diagnoses   Name Primary?    Gait instability Yes    Balance disorder     Impaired functional mobility and activity tolerance        Physician: Cathy Quesada NP    Visit Date: 11/29/2022    Physician Orders: PT Eval and Treat  Medical Diagnosis from Referral: R29.898 (ICD-10-CM) - Left leg weakness  Evaluation Date: 11/8/2022  Authorization Period Expiration: 12/31/22  Plan of Care Expiration: 12/15/2022                          Progress Update: 12/8/2022               Visit # / Visits authorized: 2/12    Time In: 3:46 pm   Time Out: 4:31 pm   Total Billable Time: 45 minutes    Precautions: Standard and Fall    PTA visit 0/5    Progress note due 12/8/22      Subjective     Pt reports: " I am doing good."     He was not yet given an HEP.   Response to previous treatment: good  Functional change: ongoing    Pain: 0/10    Location: NA    Objective     therapeutic activities to improve functional performance for 27  minutes, including :     11/29/22   TUG 8.99 seconds no AD   Self selected walking speed (10MWT)  0.89 m/sec (6m/6.76s)  no AD.    FGA 11/30 no AD       Functional Gait Assessment:   1. Gait on level surface =  2   (3) Normal: less than 5.5 sec, no A.D., no imbalance, normal gait pattern, deviates< 6in   (2) Mild impairment: 7-5.6 sec, uses A.D., mild gait deviations, or deviates 6-10 in   (1) Moderate impairment: > 7 sec, slow speed, imbalance, deviates 10-15 in.   (0) Severe impairment: needs assist, deviates >15 in, reach/touch wall  2. Change in Gait Speed = 2   (3) Normal: smooth change w/o loss of balance or gait deviation, deviates < 6 in, significant difference between speeds   (2) Mild impairment: changes speed, but demonstrates mild gait deviations, deviates 6-10 in, OR no deviations but unable to significantly speed, OR uses A.D.   (1) Moderate impairment: minor changes to " speed, OR changes speed w/ significant deviations, deviates 10-15 in, OR  Changes speed , but loses balance & recovers   (0) Severe impairment: cannot change speed, deviates >15 in, or loses balance & needs assist  3. Gait with horizontal head turns  = 1   (3) Normal: no change in gait, deviates <6 in   (2) Mild impairment: slight change in speed, deviates 6-10 in, OR uses A.D.   (1) Moderate impairment: moderate change in speed, deviates 10-15 in   (0) Severe impairment: severe disruption of gait, deviates >15in  4. Gait with vertical head turns = 1   (3) Normal: no change in gait, deviates <6 in   (2) Mild impairment: slight change in speed, deviates 6-10 in OR uses A.D.   (1) Moderate impairment: moderate change in speed, deviates 10-15 in   (0) Severe impairment: severe disruption of gait, deviates >15 in  5. Gait with pivot turns = 2   (3) Normal: performs safely in 3 sec, no LOB   (2) Mild impairment: performs in >3 sec & no LOB, OR turns safely & requires several steps to regain LOB   (1) Moderate impairment: turns slow, OR requires several small steps for balance following turn & stop   (0) Severe impairment: cannot turn safely, needs assist  6. Step over obstacle = 1   (3) Normal: steps over 2 stacked boxes w/o change in speed or LOB   (2) Mild impairment: able to step over 1 box w/o change in speed or LOB   (1) Moderate impairment: steps over 1 box but must slow down, may require VC   (0) Severe impairment: cannot perform w/o assist  7. Gait with Narrow ELLEN = 0   (3) Normal: 10 steps no staggering   (2) Mild impairment: 7-9 steps   (1) Moderate impairment: 4-7 steps   (0) Severe impairment: < 4 steps or cannot perform w/o assist  8. Gait with eyes closed = 0   (3) Normal: < 7 sec, no A.D., no LOB, normal gait pattern, deviates <6 in   (2) Mild impairment: 7.1-9 sec, mild gait deviations, deviates 6-10 in   (1) Moderate impairment: > 9 sec, abnormal pattern, LOB, deviates 10-15 in   (0) Severe impairment:  cannot perform w/o assist, LOB, deviates >15in  9. Ambulating Backwards = 1   (3) Normal: no A.D., no LOB, normal gait pattern, deviates <6in   (2) Mild impairment: uses A.D., slower speed, mild gait deviations, deviates 6-10 in   (1) Moderate impairment: slow speed, abnormal gait pattern, LOB, deviates 10-15 in   (0) Severe impairment: severe gait deviations or LOB, deviates >15in  10. Steps = 2   (3) Normal: alternating feet, no rail   (2) Mild Impairment: alternating feet, uses rail   (1) Moderate impairment: step-to, uses rail   (0) Severe impairment: cannot perform safely    Score 11/30     Score:   <22/30 fall risk   <20/30 fall risk in older adults       Jaime received therapeutic exercises to develop strength, endurance, and ROM for 10 minutes including:    - Nu step right upper extremity and bilateral lower extremity Level 3. 10 min      Jaime participated in gait training to improve functional mobility and safety for 8  minutes, including:    Ambulated 150 ft supervision x 2 trials L toe catching, L knee hyperextending.     Home Exercises Provided and Patient Education Provided     Education provided:   Educated about objective measure scores and fall risk. Answered all patient's questions and reports understanding.     Written Home Exercises Provided: yes.  Exercises were reviewed and Jaime was able to demonstrate them prior to the end of the session.  Jaime demonstrated good  understanding of the education provided.     See EMR under Patient Instructions for exercises provided 11/17/2022.    Assessment     tolerated session well. Pt has increased tone and spasticity noted in Left plantarflexors, which can be contributing to his toe catching on his left leg. Pt does get distracted during session and can be difficult to redirect back to therapy tasks at times. Performed more objective walking and balance measures today and have written goals for such results. Pt did mention that BOTOX has helped him in the  past and is going to mention to his MD about seeing if it is appropriate for him again.       Jaime Is progressing well towards his goals.   Pt prognosis is Fair.     Pt will continue to benefit from skilled outpatient physical therapy to address the deficits listed in the problem list box on initial evaluation, provide pt/family education and to maximize pt's level of independence in the home and community environment.     Pt's spiritual, cultural and educational needs considered and pt agreeable to plan of care and goals.     Anticipated barriers to physical therapy: chronic TBI symptoms.     GOALS:  SHORT TERM GOALS: 2 weeks, () Last assessed Progress   11/8/22 DISCONTINUED   Patient will be independent with HEP in order to further progress and return to maximal function. 11/8/22 ongoing    11/8/22 DISCONTINUED   11/8/22 DISCONTINUED   2. Patient will demonstrate improve endurance and activity tolerance as evidenced by ability to perform Nu-step x 15 minutes without rests breaks. 11/29/22 NEW        LONG TERM GOALS: 4 weeks, () Last assessed Progress   11/8/22 DISCONTINUED   11/8/22 DISCONTINUED     11/8/22 DISCONTINUED   11/8/22  DISCONTINUED   Patient will meet predicted functional outcome (FOTO) score: 27% to increase self-worth & perceived functional ability. 11/8/22  ongoing   11/8/22  DISCONTINUED     Patient will increase his gait speed as assessed by the timed 10MWT from 0.89 m/s to 1.03 m/s to increase his safety and (I) with gait at a community level. (MDC for CVA= 0.14 m/s) .11/29/22 NEW   The patient will improve his score on the  Functional Gait Assessment (FGA) from 11/30 to 15/30, indicating improved safety and (I) with dynamic,comunity level gait. (Minimal detectable change for stroke= 4.2 points) 11/29/22 NEW   The patient will demonstrate improved efficiency with functional mobility and decreased risk for falls as evidenced by an increase in his score on the Timed up and Go from 8.99 sec to 6.09  sec. (Minimal detectable change in chronic stroke = 2.9 seconds)  11/29/22 NEW        Plan   Plan of care Certification: 11/8/2022 to 12/15/2022.     Outpatient Physical Therapy 2 times weekly for 4 weeks to include any combination of the following interventions: virtual visits, dry needling, modalities, electrical stimulation (IFC, Pre-Mod, Attended with Functional Dry Needling), Manual Therapy, Moist Heat/ Ice, Neuromuscular Re-ed, Patient Education, Self Care, Therapeutic Exercise, Functional Training, and Therapeutic Activites     Recommendations for next treatment session:     Progress balance. Stretch gastroc soleus complex.       Fiona Rios, PT

## 2022-11-30 NOTE — PROGRESS NOTES
"  Physical Therapy Daily Treatment Note     Name: Jaime Mendoza  Clinic Number: [id number]    Therapy Diagnosis:   No diagnosis found.      Physician: Cathy Quesada NP    Visit Date: 12/1/2022    Physician Orders: PT Eval and Treat  Medical Diagnosis from Referral: R29.898 (ICD-10-CM) - Left leg weakness  Evaluation Date: 11/8/2022  Authorization Period Expiration: 12/31/22  Plan of Care Expiration: 12/15/2022                          Progress Update: 12/8/2022               Visit # / Visits authorized: 3/12    Time In: 5:15 pm   Time Out: 5:58 pm   Total Billable Time: 43 minutes    Precautions: Standard and Fall    PTA visit 0/5    Progress note due 12/8/22    Subjective     Pt reports: " I am doing good."      He was compliant with  HEP.   Response to previous treatment: good  Functional change: ongoing    Pain: 0/10     Location: NA    Objective       Jaime received therapeutic exercises to develop strength, endurance, and ROM for 12 minutes including:    - calf stretch over foam half roll x 2 minutes knee straight and x 2 minutes knee bent each lower extremity   - x 4 sit to stands supervision to don/ doff harness    Jaime participated in gait training to improve functional mobility and safety for 31  minutes, including:    Ambulated 150 ft supervision x 2 trials Left toe catching, L knee hyperextending.     1. Physical therapist performed skilled set-up for use of body-weight support treadmill including: donning of harness (size L) with patient in standing, securing all straps for optimal fit of harness. Harness used for safety, not body weight support.    2. Patient ambulated on body-weight support treadmill for 20 total minutes, achieving 0.602 km,  with parameters as follows:      Speed: 0.5 m/s   Rest breaks required: none   Use of upper extremity support: 2 hands with occasional use of one. A few trials with no upper extremity support.          Home Exercises Provided and Patient Education Provided "     Education provided:     Educated about objective measure scores and fall risk. Answered all patient's questions and reports understanding.     Written Home Exercises Provided: yes.  Exercises were reviewed and Jaime was able to demonstrate them prior to the end of the session.  Jaime demonstrated good  understanding of the education provided.     See EMR under Patient Instructions for exercises provided 11/17/2022.    Assessment     Tolerated session well. Patient initially started off with a lot of tone and spasticity and toe catching but it eased up greatly with more repetitions. Pt able to ambulate 20 minutes without rest breaks. Calf muscles are very tight bilaterally which could also contribute to the toe catching.       Jaime Is progressing well towards his goals.   Pt prognosis is Fair.     Pt will continue to benefit from skilled outpatient physical therapy to address the deficits listed in the problem list box on initial evaluation, provide pt/family education and to maximize pt's level of independence in the home and community environment.     Pt's spiritual, cultural and educational needs considered and pt agreeable to plan of care and goals.     Anticipated barriers to physical therapy: chronic TBI symptoms.     GOALS:  SHORT TERM GOALS: 2 weeks, () Last assessed Progress   11/8/22 DISCONTINUED   Patient will be independent with HEP in order to further progress and return to maximal function. 11/8/22 ongoing    11/8/22 DISCONTINUED   11/8/22 DISCONTINUED   2. Patient will demonstrate improve endurance and activity tolerance as evidenced by ability to perform Nu-step x 15 minutes without rests breaks. 11/29/22 NEW        LONG TERM GOALS: 4 weeks, () Last assessed Progress   11/8/22 DISCONTINUED   11/8/22 DISCONTINUED     11/8/22 DISCONTINUED   11/8/22  DISCONTINUED   Patient will meet predicted functional outcome (FOTO) score: 27% to increase self-worth & perceived functional ability. 11/8/22  ongoing    11/8/22  DISCONTINUED     Patient will increase his gait speed as assessed by the timed 10MWT from 0.89 m/s to 1.03 m/s to increase his safety and (I) with gait at a community level. (MDC for CVA= 0.14 m/s) .11/29/22 NEW   The patient will improve his score on the  Functional Gait Assessment (FGA) from 11/30 to 15/30, indicating improved safety and (I) with dynamic,comunity level gait. (Minimal detectable change for stroke= 4.2 points) 11/29/22 NEW   The patient will demonstrate improved efficiency with functional mobility and decreased risk for falls as evidenced by an increase in his score on the Timed up and Go from 8.99 sec to 6.09 sec. (Minimal detectable change in chronic stroke = 2.9 seconds)  11/29/22 NEW        Plan   Plan of care Certification: 11/8/2022 to 12/15/2022.     Outpatient Physical Therapy 2 times weekly for 4 weeks to include any combination of the following interventions: virtual visits, dry needling, modalities, electrical stimulation (IFC, Pre-Mod, Attended with Functional Dry Needling), Manual Therapy, Moist Heat/ Ice, Neuromuscular Re-ed, Patient Education, Self Care, Therapeutic Exercise, Functional Training, and Therapeutic Activites     Recommendations for next treatment session:     Progress balance. Stretch gastroc soleus complex.       Fiona Rios, PT

## 2022-12-01 ENCOUNTER — CLINICAL SUPPORT (OUTPATIENT)
Dept: REHABILITATION | Facility: HOSPITAL | Age: 52
End: 2022-12-01
Payer: MEDICAID

## 2022-12-01 DIAGNOSIS — R26.89 BALANCE DISORDER: ICD-10-CM

## 2022-12-01 DIAGNOSIS — R26.81 GAIT INSTABILITY: Primary | ICD-10-CM

## 2022-12-01 DIAGNOSIS — Z74.09 IMPAIRED FUNCTIONAL MOBILITY AND ACTIVITY TOLERANCE: ICD-10-CM

## 2022-12-01 PROCEDURE — 97110 THERAPEUTIC EXERCISES: CPT | Mod: PN

## 2022-12-06 ENCOUNTER — CLINICAL SUPPORT (OUTPATIENT)
Dept: REHABILITATION | Facility: HOSPITAL | Age: 52
End: 2022-12-06
Payer: MEDICAID

## 2022-12-06 DIAGNOSIS — R26.81 GAIT INSTABILITY: Primary | ICD-10-CM

## 2022-12-06 DIAGNOSIS — R26.89 BALANCE DISORDER: ICD-10-CM

## 2022-12-06 DIAGNOSIS — Z74.09 IMPAIRED FUNCTIONAL MOBILITY AND ACTIVITY TOLERANCE: ICD-10-CM

## 2022-12-06 PROCEDURE — 97110 THERAPEUTIC EXERCISES: CPT | Mod: PN

## 2022-12-06 NOTE — PROGRESS NOTES
"  Physical Therapy Daily Treatment Note     Name: Jaime Mendoza  Clinic Number: [id number]    Therapy Diagnosis:   No diagnosis found.      Physician: Cathy Quesada NP    Visit Date: 12/6/2022    Physician Orders: PT Eval and Treat  Medical Diagnosis from Referral: R29.898 (ICD-10-CM) - Left leg weakness  Evaluation Date: 11/8/2022  Authorization Period Expiration: 12/31/22  Plan of Care Expiration: 12/15/2022                          Progress Update: 12/8/2022               Visit # / Visits authorized: 3/12    Time In: ***  Time Out: ***  Total Billable Time: *** minutes    Precautions: Standard and Fall    PTA visit 0/5    Progress note due 12/8/22    Subjective     Pt reports: " I am doing good."  ***    He was compliant with  HEP.   Response to previous treatment: good  Functional change: ongoing    Pain: 0/10   ***  Location:     Objective       Jaime received therapeutic exercises to develop strength, endurance, and ROM for *** minutes including:    ***  - calf stretch over foam half roll x 2 minutes knee straight and x 2 minutes knee bent each lower extremity   - x 4 sit to stands supervision to don/ doff harness    Jaime participated in gait training to improve functional mobility and safety for ***  minutes, including:    ***  Ambulated 150 ft supervision x 2 trials Left toe catching, L knee hyperextending.     1. Physical therapist performed skilled set-up for use of body-weight support treadmill including: donning of harness (size L) with patient in standing, securing all straps for optimal fit of harness. Harness used for safety, not body weight support.    2. Patient ambulated on body-weight support treadmill for *** total minutes, achieving 0.602 km,  with parameters as follows:      Speed: 0.5 m/s   Rest breaks required: none   Use of upper extremity support: 2 hands with occasional use of one. A few trials with no upper extremity support.          Home Exercises Provided and Patient Education " Provided     Education provided:     ***  Educated about objective measure scores and fall risk. Answered all patient's questions and reports understanding.     Written Home Exercises Provided: yes.  Exercises were reviewed and Jaime was able to demonstrate them prior to the end of the session.  Jaime demonstrated good  understanding of the education provided.     See EMR under Patient Instructions for exercises provided 11/17/2022.    Assessment     ***  Tolerated session well. Patient initially started off with a lot of tone and spasticity and toe catching but it eased up greatly with more repetitions. Pt able to ambulate 20 minutes without rest breaks. Calf muscles are very tight bilaterally which could also contribute to the toe catching.       Jaime Is progressing well towards his goals.   Pt prognosis is Fair.     Pt will continue to benefit from skilled outpatient physical therapy to address the deficits listed in the problem list box on initial evaluation, provide pt/family education and to maximize pt's level of independence in the home and community environment.     Pt's spiritual, cultural and educational needs considered and pt agreeable to plan of care and goals.     Anticipated barriers to physical therapy: chronic TBI symptoms.     GOALS:  SHORT TERM GOALS: 2 weeks, () Last assessed Progress   11/8/22 DISCONTINUED   Patient will be independent with HEP in order to further progress and return to maximal function. 11/8/22 ongoing    11/8/22 DISCONTINUED   11/8/22 DISCONTINUED   2. Patient will demonstrate improve endurance and activity tolerance as evidenced by ability to perform Nu-step x 15 minutes without rests breaks. 11/29/22 ongoing        LONG TERM GOALS: 4 weeks, () Last assessed Progress   11/8/22 DISCONTINUED   11/8/22 DISCONTINUED     11/8/22 DISCONTINUED   11/8/22  DISCONTINUED   Patient will meet predicted functional outcome (FOTO) score: 27% to increase self-worth & perceived functional  ability. 11/8/22  ongoing   11/8/22  DISCONTINUED     Patient will increase his gait speed as assessed by the timed 10MWT from 0.89 m/s to 1.03 m/s to increase his safety and (I) with gait at a community level. (MDC for CVA= 0.14 m/s) .11/29/22 ongoing   The patient will improve his score on the  Functional Gait Assessment (FGA) from 11/30 to 15/30, indicating improved safety and (I) with dynamic,comunity level gait. (Minimal detectable change for stroke= 4.2 points) 11/29/22 ongoing   The patient will demonstrate improved efficiency with functional mobility and decreased risk for falls as evidenced by an increase in his score on the Timed up and Go from 8.99 sec to 6.09 sec. (Minimal detectable change in chronic stroke = 2.9 seconds)  11/29/22 ongoing        Plan   Plan of care Certification: 11/8/2022 to 12/15/2022.     Outpatient Physical Therapy 2 times weekly for 4 weeks to include any combination of the following interventions: virtual visits, dry needling, modalities, electrical stimulation (IFC, Pre-Mod, Attended with Functional Dry Needling), Manual Therapy, Moist Heat/ Ice, Neuromuscular Re-ed, Patient Education, Self Care, Therapeutic Exercise, Functional Training, and Therapeutic Activites     Recommendations for next treatment session:       ***  Progress balance. Stretch gastroc soleus complex.       Fiona Rios, PT

## 2022-12-06 NOTE — PROGRESS NOTES
"[facility]   Physical Therapy Treatment Note     Name: Jaime Mendoza  Clinic Number: [id number]    Therapy Diagnosis:   Encounter Diagnoses   Name Primary?    Gait instability Yes    Balance disorder     Impaired functional mobility and activity tolerance      Physician: Cathy Quesada NP    Visit Date: 12/6/2022    Physician Orders: PT Eval and Treat  Medical Diagnosis from Referral: R29.898 (ICD-10-CM) - Left leg weakness  Evaluation Date: 11/8/2022  Authorization Period Expiration: 12/31/22  Plan of Care Expiration: 12/15/2022                          Progress Update: 12/8/2022               Visit # / Visits authorized: 4/12    PTA Visit #: 0/5     Time In: 1700  Time Out: 1745  Total Billable Time: 45 minutes    SUBJECTIVE     Pt reports: "I noticed at work my knee was buckling a little."  He was compliant with home exercise program.  Response to previous treatment: felt well after last session  Functional change: ongoing    Pain: 0/10    OBJECTIVE     Objective Measures updated at progress report unless specified.     Treatment     Jaime received the treatments listed below:      therapeutic exercises to develop strength, endurance, ROM, flexibility, posture, and core stabilization for 45 minutes including:  Nustep: bilateral lower extremities only level 6, x 15 min  - calf stretch over foam half roll x 2 minutes knee straight and x 2 minutes knee bent each lower extremity   - ankle AROM blue theraband x20 DF/PF/EV/IV  -ambulation ~170ft. Verbal cues for posture, noted to have left toe drag, improved after ankle range of motion exercies as well as slowing gi.     *time included therapeutic rest and set-up of activity.  Patient Education and Home Exercises     Home Exercises Provided and Patient Education Provided     Education provided:   - continue HEP    Written Home Exercises Provided: Patient instructed to cont prior HEP. Exercises were reviewed and Jaime was able to " demonstrate them prior to the end of the session.  Jaime demonstrated good  understanding of the education provided. See EMR under Patient Instructions for exercises provided during therapy sessions    ASSESSMENT     Mr. Mendoza tolerated treatment well with focus on stretching gastroc as well as strengthening dorsiflexors. He required verbal cues for posture when walking. When gi slowed, he did not have as much left toe drag. Continue to work on balance, strengthening and stretching activities.    Jaime Is progressing well towards his goals.   Pt prognosis is Good.     Pt will continue to benefit from skilled outpatient physical therapy to address the deficits listed in the problem list box on initial evaluation, provide pt/family education and to maximize pt's level of independence in the home and community environment.     Pt's spiritual, cultural and educational needs considered and pt agreeable to plan of care and goals.     Anticipated barriers to physical therapy: none    GOALS:  SHORT TERM GOALS: 2 weeks, () Last assessed Progress    11/8/22 DISCONTINUED   Patient will be independent with HEP in order to further progress and return to maximal function. 11/8/22 ongoing     11/8/22 DISCONTINUED    11/8/22 DISCONTINUED   2. Patient will demonstrate improve endurance and activity tolerance as evidenced by ability to perform Nu-step x 15 minutes without rests breaks. 11/29/22 NEW         LONG TERM GOALS: 4 weeks, () Last assessed Progress    11/8/22 DISCONTINUED    11/8/22 DISCONTINUED     11/8/22 DISCONTINUED    11/8/22  DISCONTINUED   Patient will meet predicted functional outcome (FOTO) score: 27% to increase self-worth & perceived functional ability. 11/8/22  ongoing    11/8/22  DISCONTINUED     Patient will increase his gait speed as assessed by the timed 10MWT from 0.89 m/s to 1.03 m/s to increase his safety and (I) with gait at a community level. (MDC for CVA= 0.14 m/s) . 11/29/22 NEW   The patient will  improve his score on the  Functional Gait Assessment (FGA) from 11/30 to 15/30, indicating improved safety and (I) with dynamic,comunity level gait. (Minimal detectable change for stroke= 4.2 points) 11/29/22 NEW   The patient will demonstrate improved efficiency with functional mobility and decreased risk for falls as evidenced by an increase in his score on the Timed up and Go from 8.99 sec to 6.09 sec. (Minimal detectable change in chronic stroke = 2.9 seconds)  11/29/22 NEW         Plan   Plan of care Certification: 11/8/2022 to 12/15/2022.     Outpatient Physical Therapy 2 times weekly for 4 weeks to include any combination of the following interventions: virtual visits, dry needling, modalities, electrical stimulation (IFC, Pre-Mod, Attended with Functional Dry Needling), Manual Therapy, Moist Heat/ Ice, Neuromuscular Re-ed, Patient Education, Self Care, Therapeutic Exercise, Functional Training, and Therapeutic Activites     Silke Walters PT, DPT, CLT  12/6/2022

## 2022-12-08 ENCOUNTER — CLINICAL SUPPORT (OUTPATIENT)
Dept: REHABILITATION | Facility: HOSPITAL | Age: 52
End: 2022-12-08
Payer: MEDICAID

## 2022-12-08 DIAGNOSIS — R26.81 GAIT INSTABILITY: Primary | ICD-10-CM

## 2022-12-08 DIAGNOSIS — Z74.09 IMPAIRED FUNCTIONAL MOBILITY AND ACTIVITY TOLERANCE: ICD-10-CM

## 2022-12-08 DIAGNOSIS — R26.89 BALANCE DISORDER: ICD-10-CM

## 2022-12-08 PROCEDURE — 97110 THERAPEUTIC EXERCISES: CPT | Mod: PN,CQ

## 2022-12-08 NOTE — PROGRESS NOTES
"[facility]   Physical Therapy Treatment Note     Name: Jaime Mendoza  Clinic Number: [id number]    Therapy Diagnosis:   Encounter Diagnoses   Name Primary?    Gait instability Yes    Balance disorder     Impaired functional mobility and activity tolerance      Physician: Cathy Quesada NP    Visit Date: 12/8/2022    Physician Orders: PT Eval and Treat  Medical Diagnosis from Referral: R29.898 (ICD-10-CM) - Left leg weakness  Evaluation Date: 11/8/2022  Authorization Period Expiration: 12/31/22  Plan of Care Expiration: 12/15/2022                          Progress Update: 12/8/2022               Visit # / Visits authorized: 5/12    PTA Visit #: 1/5     Time In: 1610  Time Out: 1655  Total Billable Time: 45 minutes    SUBJECTIVE     Pt reports: "I am feeling ok", legs are sore today  He was compliant with home exercise program.  Response to previous treatment: no problems stated   Functional change: ongoing    Pain: 0/10    OBJECTIVE     Objective Measures updated at progress report unless specified.     Treatment     Jaime received the treatments listed below:      therapeutic exercises to develop strength, endurance, ROM, flexibility, posture, and core stabilization for 45 minutes including:    NuStep bilateral lower extremities only level 6 for 5 minutes (stated "that's it")  Hamstring stretch with foot on stool performing dorsiflexion 30 times  Seated Long arc quads alternating Right Left for 2 minutes   Seated marches alternating Right Left 2 minutes   Seated hip adduction with ball squeeze for 2 minutes     Parallel Bars:  Hamstring curls alternating Right Left for 2 minutes   Sidesteps with red Thera tubing for 2 minutes Right to Left  Weight shifting on foam cushion for 2 minutes front to back and side to side   Bosu side steps 2 minutes Right Left   Bosu balance with weight shifting Right to Left 2 minutes    Gait with verbal cues for increased knee flexion during swing " phase    (time included therapeutic rest and set-up of activity)    Patient Education and Home Exercises     Home Exercises Provided and Patient Education Provided     Education provided:   - Patient provided with verbal and demonstrative instruction for all activities performed in today's session.    Written Home Exercises Provided: Patient instructed to cont prior HEP. See EMR under Patient Instructions for exercises provided during therapy sessions    ASSESSMENT     Jaime tolerated treatment well with focus on strength/endurance as well as balance activities. He required verbal cues for increasing knee flexion when walking. Improved weight shifting on BOSU with verbal cues and tactile cues.    Jaime Is progressing well towards his goals.   Pt prognosis is Good.     Pt will continue to benefit from skilled outpatient physical therapy to address the deficits listed in the problem list box on initial evaluation, provide pt/family education and to maximize pt's level of independence in the home and community environment.     Pt's spiritual, cultural and educational needs considered and pt agreeable to plan of care and goals.     Anticipated barriers to physical therapy: none    GOALS:  SHORT TERM GOALS: 2 weeks, () Last assessed Progress    11/8/22 DISCONTINUED   Patient will be independent with HEP in order to further progress and return to maximal function. 11/8/22 progressing    11/8/22 DISCONTINUED    11/8/22 DISCONTINUED   2. Patient will demonstrate improve endurance and activity tolerance as evidenced by ability to perform Nu-step x 15 minutes without rests breaks. 11/29/22 progressing         LONG TERM GOALS: 4 weeks, () Last assessed Progress    11/8/22 DISCONTINUED    11/8/22 DISCONTINUED     11/8/22 DISCONTINUED    11/8/22  DISCONTINUED   Patient will meet predicted functional outcome (FOTO) score: 27% to increase self-worth & perceived functional ability. 11/8/22  ongoing    11/8/22  DISCONTINUED      Patient will increase his gait speed as assessed by the timed 10MWT from 0.89 m/s to 1.03 m/s to increase his safety and (I) with gait at a community level. (MDC for CVA= 0.14 m/s) . 11/29/22 ongoing   The patient will improve his score on the  Functional Gait Assessment (FGA) from 11/30 to 15/30, indicating improved safety and (I) with dynamic,comunity level gait. (Minimal detectable change for stroke= 4.2 points) 11/29/22 ongoing   The patient will demonstrate improved efficiency with functional mobility and decreased risk for falls as evidenced by an increase in his score on the Timed up and Go from 8.99 sec to 6.09 sec. (Minimal detectable change in chronic stroke = 2.9 seconds)  11/29/22 ongoing         Plan   Plan of care Certification: 11/8/2022 to 12/15/2022.     Outpatient Physical Therapy 2 times weekly for 4 weeks to include any combination of the following interventions: virtual visits, dry needling, modalities, electrical stimulation (IFC, Pre-Mod, Attended with Functional Dry Needling), Manual Therapy, Moist Heat/ Ice, Neuromuscular Re-ed, Patient Education, Self Care, Therapeutic Exercise, Functional Training, and Therapeutic Activites     Sabina Strauss, Physical Therapist Assistant   12/8/2022

## 2022-12-12 ENCOUNTER — CLINICAL SUPPORT (OUTPATIENT)
Dept: REHABILITATION | Facility: HOSPITAL | Age: 52
End: 2022-12-12
Payer: MEDICAID

## 2022-12-12 DIAGNOSIS — R26.81 GAIT INSTABILITY: Primary | ICD-10-CM

## 2022-12-12 DIAGNOSIS — Z74.09 IMPAIRED FUNCTIONAL MOBILITY AND ACTIVITY TOLERANCE: ICD-10-CM

## 2022-12-12 DIAGNOSIS — R26.89 BALANCE DISORDER: ICD-10-CM

## 2022-12-12 PROCEDURE — 97110 THERAPEUTIC EXERCISES: CPT | Mod: PN,CQ

## 2022-12-12 NOTE — PROGRESS NOTES
"[facility]   Physical Therapy Treatment Note     Name: Jaime Mendoza  Clinic Number: [id number]    Therapy Diagnosis:   Encounter Diagnoses   Name Primary?    Gait instability Yes    Balance disorder     Impaired functional mobility and activity tolerance      Physician: Cathy Quesada NP    Visit Date: 12/12/2022    Physician Orders: PT Eval and Treat  Medical Diagnosis from Referral: R29.898 (ICD-10-CM) - Left leg weakness  Evaluation Date: 11/8/2022  Authorization Period Expiration: 12/31/22  Plan of Care Expiration: 12/15/2022                                    Visit # / Visits authorized: 6/12    PTA Visit #: 1/5     Time In: 1745  Time Out: 1830  Total Billable Time: 45 minutes    SUBJECTIVE     Pt reports: Left knee "stiff" with ambulation and having "a little" Left knee pain  He was compliant with home exercise program.  Response to previous treatment: no problems stated   Functional change: ongoing    Pain: 2-3/10  Location: Left knee    OBJECTIVE     Objective Measures updated at progress report unless specified.     Treatment     Jaime received the treatments listed below:      therapeutic exercises to develop strength, endurance, range of motion, flexibility, posture, and core stabilization for 45 minutes including:    Recumbent bike Level 4 10 minutes    Shuttle:  62# Bilateral leg press   50# Bilateral jumps 3x5 with verbal cues for proper form    Sports Art:  44# Bilateral knee flexion 20 times  55# Bilateral knee extension 20 times    Parallel Bars:  Calf stretches 3x30 seconds 1/2 roll Bilateral   March in place 20 times   Hamstring curls alternating Right Left for 1 minute    Gait with verbal cues for increased knee Left flexion during swing phase    Patient Education and Home Exercises     Home Exercises Provided and Patient Education Provided     Education provided:   - Patient provided with verbal and demonstrative instruction for all activities performed in today's " session.    Written Home Exercises Provided: Patient instructed to cont prior HEP. See EMR under Patient Instructions for exercises provided during therapy sessions    ASSESSMENT     Jaime tolerated treatment well with focus on strength/endurance as well as balance activities. He required verbal cues for increasing Left knee flexion when walking with fair follow through. Jaime needing verbal cues for decreased speed with most activities and improved form.    Jaime Is progressing well towards his goals.   Pt prognosis is Good.     Pt will continue to benefit from skilled outpatient physical therapy to address the deficits listed in the problem list box on initial evaluation, provide pt/family education and to maximize pt's level of independence in the home and community environment.     Pt's spiritual, cultural and educational needs considered and pt agreeable to plan of care and goals.     Anticipated barriers to physical therapy: none    GOALS:  SHORT TERM GOALS: 2 weeks, () Last assessed Progress    11/8/22 DISCONTINUED   Patient will be independent with HEP in order to further progress and return to maximal function. 11/8/22 progressing    11/8/22 DISCONTINUED    11/8/22 DISCONTINUED   2. Patient will demonstrate improve endurance and activity tolerance as evidenced by ability to perform Nu-step x 15 minutes without rests breaks. 11/29/22 progressing         LONG TERM GOALS: 4 weeks, () Last assessed Progress    11/8/22 DISCONTINUED    11/8/22 DISCONTINUED     11/8/22 DISCONTINUED    11/8/22  DISCONTINUED   Patient will meet predicted functional outcome (FOTO) score: 27% to increase self-worth & perceived functional ability. 11/8/22  ongoing    11/8/22  DISCONTINUED     Patient will increase his gait speed as assessed by the timed 10MWT from 0.89 m/s to 1.03 m/s to increase his safety and (I) with gait at a community level. (MDC for CVA= 0.14 m/s) . 11/29/22 ongoing   The patient will improve his score on the   Functional Gait Assessment (FGA) from 11/30 to 15/30, indicating improved safety and (I) with dynamic,comunity level gait. (Minimal detectable change for stroke= 4.2 points) 11/29/22 ongoing   The patient will demonstrate improved efficiency with functional mobility and decreased risk for falls as evidenced by an increase in his score on the Timed up and Go from 8.99 sec to 6.09 sec. (Minimal detectable change in chronic stroke = 2.9 seconds)  11/29/22 ongoing         Plan   Plan of care Certification: 11/8/2022 to 12/15/2022.     Outpatient Physical Therapy 2 times weekly for 4 weeks to include any combination of the following interventions: virtual visits, dry needling, modalities, electrical stimulation (IFC, Pre-Mod, Attended with Functional Dry Needling), Manual Therapy, Moist Heat/ Ice, Neuromuscular Re-ed, Patient Education, Self Care, Therapeutic Exercise, Functional Training, and Therapeutic Activites     Sabina Strauss, Physical Therapist Assistant   12/12/2022

## 2022-12-13 ENCOUNTER — DOCUMENTATION ONLY (OUTPATIENT)
Dept: REHABILITATION | Facility: HOSPITAL | Age: 52
End: 2022-12-13
Payer: MEDICAID

## 2022-12-13 NOTE — PROGRESS NOTES
PT/PTA met face to face to discuss pt's treatment plan and progress towards established goals. Pt will be seen by a physical therapist minimally every 6th visit or every 30 days.    Sabina Strauss PTA

## 2022-12-15 ENCOUNTER — CLINICAL SUPPORT (OUTPATIENT)
Dept: REHABILITATION | Facility: HOSPITAL | Age: 52
End: 2022-12-15
Payer: MEDICAID

## 2022-12-15 DIAGNOSIS — R26.89 BALANCE DISORDER: ICD-10-CM

## 2022-12-15 DIAGNOSIS — Z74.09 IMPAIRED FUNCTIONAL MOBILITY AND ACTIVITY TOLERANCE: ICD-10-CM

## 2022-12-15 DIAGNOSIS — R26.81 GAIT INSTABILITY: Primary | ICD-10-CM

## 2022-12-15 PROCEDURE — 97110 THERAPEUTIC EXERCISES: CPT | Mod: PN

## 2022-12-15 NOTE — PROGRESS NOTES
Patient participated in reassessment to complete physical therapy updated plan of care. Please see attached physical therapy updated plan of care for additional detail.

## 2022-12-23 NOTE — PLAN OF CARE
"[facility]  Physical Therapy Plan of Care Note    Name: Jaime Mendoza  Clinic Number: [id number]    Therapy Diagnosis:   Encounter Diagnoses   Name Primary?    Gait instability Yes    Balance disorder     Impaired functional mobility and activity tolerance      Physician: Cathy Quesada NP    Visit Date: 12/15/2022    Physician Orders: PT Eval and Treat  Medical Diagnosis from Referral: R29.898 (ICD-10-CM) - Left leg weakness  Evaluation Date: 11/8/2022  Authorization Period Expiration: 12/31/22  Updated Plan of Care Expiration: 1/27/2023                                    Visit # / Visits authorized: 7/12     PTA Visit #: 0/5      Time In: 1616  Time Out: 1700  Total Billable Time: 44 minutes    SUBJECTIVE     Pt reports: "If I pay attention to my left leg, my foot doesn't drag. I get in a hurry, and forget about it."       He was compliant with home exercise program.  Response to previous treatment: no problems stated   Functional change: ongoing     Pain: 0/10  Location: Left knee    OBJECTIVE     Jaime received therapeutic exercises to develop strength, endurance, and ROM for 15 minutes including:    NuStep level 5 x 15 minutes with bilateral lower extremities only. Heart rate assessed post activity and found to be 109 bpm.    Jaime participated in dynamic functional therapeutic activities to improve functional performance for 29  minutes, including:    Patient participated in reassessment of standardized outcome measures to assess his progress towards goals to date. Results as follows:       Functional Gait Assessment:   1. Gait on level surface =  1  (3) Normal: less than 5.5 sec, no A.D., no imbalance, normal gait pattern, deviates< 6in  (2) Mild impairment: 7-5.6 sec, uses A.D., mild gait deviations, or deviates 6-10 in  (1) Moderate impairment: > 7 sec, slow speed, imbalance, deviates 10-15 in.  (0) Severe impairment: needs assist, deviates >15 in, reach/touch wall  2. Change in " Gait Speed = 1  (3) Normal: smooth change w/o loss of balance or gait deviation, deviates < 6 in, significant difference between speeds  (2) Mild impairment: changes speed, but demonstrates mild gait deviations, deviates 6-10 in, OR no deviations but unable to significantly speed, OR uses A.D.  (1) Moderate impairment: minor changes to speed, OR changes speed w/ significant deviations, deviates 10-15 in, OR  Changes speed , but loses balance & recovers  (0) Severe impairment: cannot change speed, deviates >15 in, or loses balance & needs assist  3. Gait with horizontal head turns  = 2  (3) Normal: no change in gait, deviates <6 in  (2) Mild impairment: slight change in speed, deviates 6-10 in, OR uses A.D.  (1) Moderate impairment: moderate change in speed, deviates 10-15 in  (0) Severe impairment: severe disruption of gait, deviates >15in  4. Gait with vertical head turns = 2  (3) Normal: no change in gait, deviates <6 in  (2) Mild impairment: slight change in speed, deviates 6-10 in OR uses A.D.  (1) Moderate impairment: moderate change in speed, deviates 10-15 in  (0) Severe impairment: severe disruption of gait, deviates >15 in  5. Gait with pivot turns = 2  (3) Normal: performs safely in 3 sec, no LOB  (2) Mild impairment: performs in >3 sec & no LOB, OR turns safely & requires several steps to regain LOB  (1) Moderate impairment: turns slow, OR requires several small steps for balance following turn & stop  (0) Severe impairment: cannot turn safely, needs assist  6. Step over obstacle = 1  (3) Normal: steps over 2 stacked boxes w/o change in speed or LOB  (2) Mild impairment: able to step over 1 box w/o change in speed or LOB  (1) Moderate impairment: steps over 1 box but must slow down, may require VC  (0) Severe impairment: cannot perform w/o assist  7. Gait with Narrow ELLEN = 0  (3) Normal: 10 steps no staggering  (2) Mild impairment: 7-9 steps  (1) Moderate impairment: 4-7 steps  (0) Severe impairment: < 4  steps or cannot perform w/o assist  8. Gait with eyes closed = 1  (3) Normal: < 7 sec, no A.D., no LOB, normal gait pattern, deviates <6 in  (2) Mild impairment: 7.1-9 sec, mild gait deviations, deviates 6-10 in  (1) Moderate impairment: > 9 sec, abnormal pattern, LOB, deviates 10-15 in  (0) Severe impairment: cannot perform w/o assist, LOB, deviates >15in  9. Ambulating Backwards = 1  (3) Normal: no A.D., no LOB, normal gait pattern, deviates <6in  (2) Mild impairment: uses A.D., slower speed, mild gait deviations, deviates 6-10 in  (1) Moderate impairment: slow speed, abnormal gait pattern, LOB, deviates 10-15 in  (0) Severe impairment: severe gait deviations or LOB, deviates >15in  10. Steps = 2  (3) Normal: alternating feet, no rail  (2) Mild Impairment: alternating feet, uses rail  (1) Moderate impairment: step-to, uses rail  (0) Severe impairment: cannot perform safely    Score 13/30     Score:   <22/30 fall risk   <20/30 fall risk in older adults   <18/30 fall risk in Parkinsons         11/29/22 12/15/2022     TUG 8.99 seconds no AD 9.52  8.12   Self selected walking speed (10MWT)  0.89 m/sec (6m/6.76s)  no AD.  0.84 m/sec (6m/7.12s)  no AD.    FGA 11/30 no AD 13/30     ASSESSMENT    Jaime provided good participation and effort during session today with focus on reassessment of endurance, gait speed and dynamic balance during ambulation to evaluate progress towards goals established at start of therapy.     Patient has met 1/2 short term goals and 0/3 long term goals established at initial evaluation. he is demonstrating good progress towards remaining short and long term goals. he displays improved dynamic gait (as evidenced by a 2 point increased on the Functional Gait Assessment) as compared to initial assessment. Physical therapist feels that patient is progressing towards long term goals established at start of care and has potential for continued improvement. he remains a good candidate for outpatient  physical therapy. Patient was provided education regarding results of reassessment completed today and recommendation for continuation of physical therapy plan of care for  6 additional weeks.       Previous Short Term Goals Status:   2/2 progressing   New Short Term Goals Status:   see below   Long Term Goal Status: continue per initial plan of care.  Reasons for Recertification of Therapy:   Mr. Mendoza continues to present with increased lower extremity spasticity, decreased left lower extremity strength, and decreased balance contributing to decreased safety and independence with ambulation, particularly at a community level. he has demonstrated progress towards goals established at initial evaluation, and PT feels that he has potential for continued functional improvement with further therapy. he  will benefit from continued skilled PT intervention to address impairments and improve his/her overall safety and (I) with functional mobility.       GOALS    SHORT TERM GOALS: 2 weeks, () Last assessed Progress    11/8/22 DISCONTINUED   Patient will be independent with HEP in order to further progress and return to maximal function. 12/15/2022 progressing    11/8/22 DISCONTINUED    11/8/22 DISCONTINUED   2. Patient will demonstrate improve endurance and activity tolerance as evidenced by ability to perform Nu-step x 15 minutes without rests breaks. 12/15/2022   Met         LONG TERM GOALS: 4 weeks, () Last assessed Progress    11/8/22 DISCONTINUED    11/8/22 DISCONTINUED     11/8/22 DISCONTINUED    11/8/22  DISCONTINUED    11/8/22  DISCONTINUED     Patient will increase his gait speed as assessed by the timed 10MWT from 0.89 m/s to 1.03 m/s to increase his safety and (I) with gait at a community level. (MDC for CVA= 0.14 m/s) . 12/15/2022 ongoing   The patient will improve his score on the  Functional Gait Assessment (FGA) from 11/30 to 15/30, indicating improved safety and (I) with dynamic,comunity level gait.  (Minimal detectable change for stroke= 4.2 points) 12/15/2022 Progressing    The patient will demonstrate improved efficiency with functional mobility and decreased risk for falls as evidenced by an increase in his score on the Timed up and Go from 8.99 sec to 6.09 sec. (Minimal detectable change in chronic stroke = 2.9 seconds)  12/15/2022 Progressing         PLAN     Updated Certification Period: 12/15/2022 to 01/27/23  Recommended Treatment Plan: 2 times per week for 6 weeks:  Gait Training, Manual Therapy, Moist Heat/ Ice, Neuromuscular Re-ed, Orthotic Management and Training, Patient Education, Therapeutic Activities, and Therapeutic Exercise  Other Recommendations: none at this time    RADAMES PALACIOS, PT    I CERTIFY THE NEED FOR THESE SERVICES FURNISHED UNDER THIS PLAN OF TREATMENT AND WHILE UNDER MY CARE  Physician's comments:      Physician's Signature: ___________________________________________________

## 2022-12-27 ENCOUNTER — CLINICAL SUPPORT (OUTPATIENT)
Dept: REHABILITATION | Facility: HOSPITAL | Age: 52
End: 2022-12-27
Payer: MEDICAID

## 2022-12-27 DIAGNOSIS — R26.89 BALANCE DISORDER: ICD-10-CM

## 2022-12-27 DIAGNOSIS — R26.81 GAIT INSTABILITY: Primary | ICD-10-CM

## 2022-12-27 DIAGNOSIS — Z74.09 IMPAIRED FUNCTIONAL MOBILITY AND ACTIVITY TOLERANCE: ICD-10-CM

## 2022-12-27 PROCEDURE — 97110 THERAPEUTIC EXERCISES: CPT | Mod: PN

## 2022-12-27 NOTE — PROGRESS NOTES
"[facility]   Physical Therapy Treatment Note     Name: Jaime Mendoza  Clinic Number: [id number]    Therapy Diagnosis:   Encounter Diagnoses   Name Primary?    Gait instability Yes    Balance disorder     Impaired functional mobility and activity tolerance        Physician: Cathy Quesada NP    Visit Date: 12/27/2022    Physician Orders: PT Eval and Treat  Medical Diagnosis from Referral: R29.898 (ICD-10-CM) - Left leg weakness  Evaluation Date: 11/8/2022  Authorization Period Expiration: 12/31/22  Updated Plan of Care Expiration: 1/27/2023                                Visit # / Visits authorized: 8/12    PTA Visit #: 0/5     Time In: 5:00 pm  Time Out: 5:45 pm   Total Billable Time: 45 minutes    SUBJECTIVE     Pt reports: "My left knee still bothers me sometimes. I had an ACL surgery on that knee when I was 13 and they said I may need a knee replacement one day."    He was compliant with home exercise program.  Response to previous treatment: no problems stated   Functional change: ongoing    Pain: 2/10   Location: Left knee    OBJECTIVE     Objective Measures updated at progress report unless specified.     Treatment     Jaime received the treatments listed below:      Jaime participated in gait training to improve functional mobility and safety for 15  minutes, including:      GaitBetter     Duration Speed Distance Obstacles Navigation Level Handrails Score   Trial 1 10 minutes 1.3 mph 374 yards 0/0 none custom Bilateral/ one hand 442       Skilled setup and breakdown required. Therapeutic rest break in between trials.     Ambulated 130 ft x 2 trials supervision no assistive device.    therapeutic exercises to develop strength, endurance, range of motion, flexibility, posture, and core stabilization for 30 minutes including:    Shuttle:  100# Bilateral leg shuttle leg press  3x15   75# single leg shuttle leg press 3x10 right lower extremity   50# Left lower extremity shuttle leg " press 3x10    Parallel Bars:  Calf stretches 4x30 seconds 1/2 roll Bilateral lower extremity       Patient Education and Home Exercises     Home Exercises Provided and Patient Education Provided     Education provided:     Educated about GaitBetter and purpose. Reports understanding    Written Home Exercises Provided: Patient instructed to cont prior HEP. See EMR under Patient Instructions for exercises provided during therapy sessions    ASSESSMENT     Pt tolerated session well. Introduced the Gait Better virtual reality treadmill training. Pt working on stepping up and over obstacles mainly on his right side (difficulty lifting Left lower extremity high with increased tone on left.). Pt continues to have high extensor tone in left lower extremity, contributing to knee hyperextension on left. Calf stretch and strengthening performed .     Jaime Is progressing well towards his goals.   Pt prognosis is Good.     Pt will continue to benefit from skilled outpatient physical therapy to address the deficits listed in the problem list box on initial evaluation, provide pt/family education and to maximize pt's level of independence in the home and community environment.     Pt's spiritual, cultural and educational needs considered and pt agreeable to plan of care and goals.     Anticipated barriers to physical therapy: none    GOALS:  SHORT TERM GOALS: 2 weeks, () Last assessed Progress    11/8/22 DISCONTINUED   Patient will be independent with HEP in order to further progress and return to maximal function. 11/8/22 progressing    11/8/22 DISCONTINUED    11/8/22 DISCONTINUED   2. Patient will demonstrate improve endurance and activity tolerance as evidenced by ability to perform Nu-step x 15 minutes without rests breaks. 11/29/22 progressing         LONG TERM GOALS: 4 weeks, () Last assessed Progress    11/8/22 DISCONTINUED    11/8/22 DISCONTINUED     11/8/22 DISCONTINUED    11/8/22  DISCONTINUED   Patient will meet  predicted functional outcome (FOTO) score: 27% to increase self-worth & perceived functional ability. 11/8/22  ongoing    11/8/22  DISCONTINUED     Patient will increase his gait speed as assessed by the timed 10MWT from 0.89 m/s to 1.03 m/s to increase his safety and (I) with gait at a community level. (MDC for CVA= 0.14 m/s) . 11/29/22 ongoing   The patient will improve his score on the  Functional Gait Assessment (FGA) from 11/30 to 15/30, indicating improved safety and (I) with dynamic,comunity level gait. (Minimal detectable change for stroke= 4.2 points) 11/29/22 ongoing   The patient will demonstrate improved efficiency with functional mobility and decreased risk for falls as evidenced by an increase in his score on the Timed up and Go from 8.99 sec to 6.09 sec. (Minimal detectable change in chronic stroke = 2.9 seconds)  11/29/22 ongoing         Plan      Updated Certification Period: 12/15/2022 to 01/27/23  Recommended Treatment Plan: 2 times per week for 6 weeks:  Gait Training, Manual Therapy, Moist Heat/ Ice, Neuromuscular Re-ed, Orthotic Management and Training, Patient Education, Therapeutic Activities, and Therapeutic Exercise    Recommendations for next session:   Gait better  Strengthening Left knee and stretch calf    Fiona Rios, PT

## 2022-12-30 NOTE — PROGRESS NOTES
"[facility]   Physical Therapy Treatment Note     Name: Jaime Mendoza  Clinic Number: [id number]    Therapy Diagnosis:   No diagnosis found.      Physician: Cathy Quesada NP    Visit Date: 1/3/2023    Physician Orders: PT Eval and Treat  Medical Diagnosis from Referral: R29.898 (ICD-10-CM) - Left leg weakness  Evaluation Date: 11/8/2022  Authorization Period Expiration: 12/31/22  Updated Plan of Care Expiration: 1/27/2023                                Visit # / Visits authorized: 8/12    PTA Visit #: 0/5     Time In: 5:00 pm   Time Out: 5:46 pm   Total Billable Time: 46 minutes    SUBJECTIVE     Pt reports:  " I just don't know why my toe catches".    He was compliant with home exercise program.  Response to previous treatment: no problems stated   Functional change: ongoing    Pain: 2/10   Location: Left knee    OBJECTIVE     Objective Measures updated at progress report unless specified.     Treatment     Jaime received the treatments listed below:      Jaime participated in gait training to improve functional mobility and safety for 5  minutes, including:  - pt ambulated 150 ft x 2 trials supervision. Left toe catching. Increased toe catching with increased speed.       therapeutic exercises to develop strength, endurance, range of motion, flexibility, posture, and core stabilization for 41 minutes including:    - supine hamstring stretch 30 sec x 6 trials each  - single knee to chest 30 sec x 4 trials each  - Piriformis stretch 30 sec x 6 trials each    Parallel Bars:  Calf stretches 4x30 seconds 1/2 roll Bilateral lower extremity     Education provided. See below      Patient Education and Home Exercises     Home Exercises Provided and Patient Education Provided     Education provided:     Pt with questions about tone/ spasticity. Questions about management. Discussed options and discussed speaking to his MD about his increased tone and spasticity in function to see what they " recommend. Educated about Bioness for neuro reeducation. Discussed contraindication and indications.     Written Home Exercises Provided: Patient instructed to cont prior HEP. See EMR under Patient Instructions for exercises provided during therapy sessions    ASSESSMENT     Tolerated session well. Session consisted of a lot of education and answering patients questions about tone and spasticity and options for improvement. Pt still has tone in his left leg causing his toe to catch as a result of decreased ankle DF and knee flexion during gait.     Jaime Is progressing well towards his goals.   Pt prognosis is Good.     Pt will continue to benefit from skilled outpatient physical therapy to address the deficits listed in the problem list box on initial evaluation, provide pt/family education and to maximize pt's level of independence in the home and community environment.     Pt's spiritual, cultural and educational needs considered and pt agreeable to plan of care and goals.     Anticipated barriers to physical therapy: none    GOALS:  SHORT TERM GOALS: 2 weeks, () Last assessed Progress    11/8/22 DISCONTINUED   Patient will be independent with HEP in order to further progress and return to maximal function. 11/8/22 progressing    11/8/22 DISCONTINUED    11/8/22 DISCONTINUED   2. Patient will demonstrate improve endurance and activity tolerance as evidenced by ability to perform Nu-step x 15 minutes without rests breaks. 11/29/22 progressing         LONG TERM GOALS: 4 weeks, () Last assessed Progress    11/8/22 DISCONTINUED    11/8/22 DISCONTINUED     11/8/22 DISCONTINUED    11/8/22  DISCONTINUED   Patient will meet predicted functional outcome (FOTO) score: 27% to increase self-worth & perceived functional ability. 11/8/22  ongoing    11/8/22  DISCONTINUED     Patient will increase his gait speed as assessed by the timed 10MWT from 0.89 m/s to 1.03 m/s to increase his safety and (I) with gait at a community  level. (MDC for CVA= 0.14 m/s) . 11/29/22 ongoing   The patient will improve his score on the  Functional Gait Assessment (FGA) from 11/30 to 15/30, indicating improved safety and (I) with dynamic,comunity level gait. (Minimal detectable change for stroke= 4.2 points) 11/29/22 ongoing   The patient will demonstrate improved efficiency with functional mobility and decreased risk for falls as evidenced by an increase in his score on the Timed up and Go from 8.99 sec to 6.09 sec. (Minimal detectable change in chronic stroke = 2.9 seconds)  11/29/22 ongoing         Plan      Updated Certification Period: 12/15/2022 to 01/27/23  Recommended Treatment Plan: 2 times per week for 6 weeks:  Gait Training, Manual Therapy, Moist Heat/ Ice, Neuromuscular Re-ed, Orthotic Management and Training, Patient Education, Therapeutic Activities, and Therapeutic Exercise    Recommendations for next session:     Gait better  Strengthening Left knee and stretch calf    Fiona Rios, PT

## 2023-01-03 ENCOUNTER — CLINICAL SUPPORT (OUTPATIENT)
Dept: REHABILITATION | Facility: HOSPITAL | Age: 53
End: 2023-01-03
Payer: MEDICAID

## 2023-01-03 DIAGNOSIS — R26.89 BALANCE DISORDER: ICD-10-CM

## 2023-01-03 DIAGNOSIS — Z74.09 IMPAIRED FUNCTIONAL MOBILITY AND ACTIVITY TOLERANCE: ICD-10-CM

## 2023-01-03 DIAGNOSIS — R26.81 GAIT INSTABILITY: Primary | ICD-10-CM

## 2023-01-03 PROCEDURE — 97110 THERAPEUTIC EXERCISES: CPT | Mod: PN

## 2023-01-09 ENCOUNTER — CLINICAL SUPPORT (OUTPATIENT)
Dept: REHABILITATION | Facility: HOSPITAL | Age: 53
End: 2023-01-09
Payer: MEDICAID

## 2023-01-09 DIAGNOSIS — Z74.09 IMPAIRED FUNCTIONAL MOBILITY AND ACTIVITY TOLERANCE: ICD-10-CM

## 2023-01-09 DIAGNOSIS — R26.81 GAIT INSTABILITY: Primary | ICD-10-CM

## 2023-01-09 DIAGNOSIS — R26.89 BALANCE DISORDER: ICD-10-CM

## 2023-01-09 PROCEDURE — 97110 THERAPEUTIC EXERCISES: CPT | Mod: PN,CQ

## 2023-01-09 NOTE — PROGRESS NOTES
[facility]   Physical Therapy Treatment Note     Name: Jaime Mendoza  Clinic Number: [id number]    Therapy Diagnosis:   Encounter Diagnoses   Name Primary?    Gait instability Yes    Balance disorder     Impaired functional mobility and activity tolerance          Physician: Cathy Quesada NP    Visit Date: 1/9/2023    Physician Orders: PT Eval and Treat  Medical Diagnosis from Referral: R29.898 (ICD-10-CM) - Left leg weakness  Evaluation Date: 11/8/2022  Authorization Period Expiration: 12/31/22  Updated Plan of Care Expiration: 1/27/2023                                Visit # / Visits authorized: 8/12    PTA Visit #: 1/5     Time In: 1703  Time Out: 1745  Total Billable Time: 42 minutes    SUBJECTIVE     Pt reports: Does not have time to do home exercise program.  He was not compliant with home exercise program, walks and does some stretching  Response to previous treatment: Left hip soreness  Functional change: ongoing    Pain: 2/10   Location: Left knee    OBJECTIVE     Objective Measures updated at progress report unless specified.     Treatment     Jaime received the treatments listed below:      therapeutic exercises to develop strength, endurance, range of motion, flexibility for 20 minutes including:    Recumbent bike Level 4 10 minutes    Calf stretch with towel roll on floor 2x 1 minute Right Left     gait training to improve functional mobility and safety for 22  minutes, including:    Virtual-reality treadmill training with focus on visual motion tolerance during functional activity with the following set-up and specifications:     Skilled set-up for use of virtual reality treadmill including: donning of harness with patient in standing, securing all straps for optimal fit of harness and cut off switch for safety.     Date Trial Duration Speed Distance Obstacles Navigation Handrails Score   01/09/2023    1 10 min 1.4 mph 374 yards  6  none Bilateral   Right only  350         Gait without assistive device 2x120 feet with verbal cues to increase Left hip flexion during swing phase.    Patient Education and Home Exercises     Home Exercises Provided and Patient Education Provided     Education provided:   - Patient provided with verbal and demonstrative instruction for all activities performed in today's session.  - Do march in place during the day to help improve Left foot clearance during ambulation    Written Home Exercises Provided: Patient instructed to cont prior HEP. See EMR under Patient Instructions for exercises provided during therapy sessions    ASSESSMENT     Jaime provided good participation and effort during today's session with treatment focused on lower extremity strengthening and gait to improve Left hip flexion. Jaime tolerated activities without complaints..      Jaime Is progressing well towards his goals.   Pt prognosis is Good.     Pt will continue to benefit from skilled outpatient physical therapy to address the deficits listed in the problem list box on initial evaluation, provide pt/family education and to maximize pt's level of independence in the home and community environment.     Pt's spiritual, cultural and educational needs considered and pt agreeable to plan of care and goals.     Anticipated barriers to physical therapy: none    GOALS:  SHORT TERM GOALS: 2 weeks, () Last assessed Progress    11/8/22 DISCONTINUED   Patient will be independent with HEP in order to further progress and return to maximal function. 11/8/22 progressing    11/8/22 DISCONTINUED    11/8/22 DISCONTINUED   2. Patient will demonstrate improve endurance and activity tolerance as evidenced by ability to perform Nu-step x 15 minutes without rests breaks. 11/29/22 progressing         LONG TERM GOALS: 4 weeks, () Last assessed Progress    11/8/22 DISCONTINUED    11/8/22 DISCONTINUED     11/8/22 DISCONTINUED    11/8/22  DISCONTINUED   Patient will meet predicted functional outcome  (FOTO) score: 27% to increase self-worth & perceived functional ability. 11/8/22  ongoing    11/8/22  DISCONTINUED     Patient will increase his gait speed as assessed by the timed 10MWT from 0.89 m/s to 1.03 m/s to increase his safety and (I) with gait at a community level. (MDC for CVA= 0.14 m/s) . 11/29/22 ongoing   The patient will improve his score on the  Functional Gait Assessment (FGA) from 11/30 to 15/30, indicating improved safety and (I) with dynamic,comunity level gait. (Minimal detectable change for stroke= 4.2 points) 11/29/22 ongoing   The patient will demonstrate improved efficiency with functional mobility and decreased risk for falls as evidenced by an increase in his score on the Timed up and Go from 8.99 sec to 6.09 sec. (Minimal detectable change in chronic stroke = 2.9 seconds)  11/29/22 ongoing         Plan      Updated Certification Period: 12/15/2022 to 01/27/23  Recommended Treatment Plan: 2 times per week for 6 weeks:  Gait Training, Manual Therapy, Moist Heat/ Ice, Neuromuscular Re-ed, Orthotic Management and Training, Patient Education, Therapeutic Activities, and Therapeutic Exercise    Recommendations for next session:     Gait better  Strengthening Left knee and stretch calf    Sabina Strauss, PTA

## 2023-01-17 ENCOUNTER — CLINICAL SUPPORT (OUTPATIENT)
Dept: REHABILITATION | Facility: HOSPITAL | Age: 53
End: 2023-01-17
Payer: MEDICAID

## 2023-01-17 DIAGNOSIS — R26.89 BALANCE DISORDER: ICD-10-CM

## 2023-01-17 DIAGNOSIS — R26.81 GAIT INSTABILITY: Primary | ICD-10-CM

## 2023-01-17 DIAGNOSIS — Z74.09 IMPAIRED FUNCTIONAL MOBILITY AND ACTIVITY TOLERANCE: ICD-10-CM

## 2023-01-17 PROCEDURE — 97110 THERAPEUTIC EXERCISES: CPT | Mod: PN,CQ

## 2023-01-17 PROCEDURE — 97116 GAIT TRAINING THERAPY: CPT | Mod: PN,CQ

## 2023-01-17 NOTE — PROGRESS NOTES
[facility]   Physical Therapy Treatment Note     Name: Jaime Mendoza  Clinic Number: [id number]    Therapy Diagnosis:   Encounter Diagnoses   Name Primary?    Gait instability Yes    Balance disorder     Impaired functional mobility and activity tolerance        Physician: Cathy Quesada NP    Visit Date: 1/17/2023    Physician Orders: PT Eval and Treat  Medical Diagnosis from Referral: R29.898 (ICD-10-CM) - Left leg weakness  Evaluation Date: 11/8/2022  Authorization Period Expiration: 12/31/22  Updated Plan of Care Expiration: 1/27/2023                                Visit # / Visits authorized: 9/12    PTA Visit #: 1/5     Time In: 5:06 pm ( patient late)  Time Out: 5:42 pm   Total Billable Time: 36 minutes    SUBJECTIVE     Pt reports: patient has not new reports or complaints of pain     He was compliant with home exercise program.  Response to previous treatment: no problems stated   Functional change: ongoing    Pain: 0/10   Location: Left knee    OBJECTIVE     Objective Measures updated at progress report unless specified.     Treatment     Jaime received the treatments listed below:      Jaime participated in gait training to improve functional mobility and safety for 15  minutes, including:      GaitBetter     Duration Speed Distance Obstacles Navigation Level Handrails Score   Trial 1  7 minutes 1.7mph to 2.0 mph 374 yards 3/16 none custom Bilateral/ one hand 370       Skilled setup and breakdown required. Therapeutic rest break in between trials.     Ambulated 130 ft x 1 trials supervision no assistive device.    therapeutic exercises to develop strength, endurance, range of motion, flexibility, posture, and core stabilization for 30 minutes including:    Shuttle:  100# Bilateral leg shuttle leg press  3x15   75# single leg shuttle leg press 3x10 right lower extremity   50# Left lower extremity shuttle leg press 3x10    Parallel Bars:  Calf stretches 4x30 seconds 1/2 roll  Bilateral lower extremity       Patient Education and Home Exercises     Home Exercises Provided and Patient Education Provided     Education provided:   Patient provided with verbal and demonstrative instruction for all activities performed in today's session.     Written Home Exercises Provided: Patient instructed to cont prior HEP. See EMR under Patient Instructions for exercises provided during therapy sessions    ASSESSMENT     Patient provided good participation and effort during today's session with treatment focused on gait, patient completed gait better with increase speed and completed strengthening lower extremity exercised .   Jaime Is progressing well towards his goals.   Pt prognosis is Good.     Pt will continue to benefit from skilled outpatient physical therapy to address the deficits listed in the problem list box on initial evaluation, provide pt/family education and to maximize pt's level of independence in the home and community environment.     Pt's spiritual, cultural and educational needs considered and pt agreeable to plan of care and goals.     Anticipated barriers to physical therapy: none    GOALS:  SHORT TERM GOALS: 2 weeks, () Last assessed Progress    11/8/22 DISCONTINUED   Patient will be independent with HEP in order to further progress and return to maximal function. 11/8/22 progressing    11/8/22 DISCONTINUED    11/8/22 DISCONTINUED   2. Patient will demonstrate improve endurance and activity tolerance as evidenced by ability to perform Nu-step x 15 minutes without rests breaks. 11/29/22 progressing         LONG TERM GOALS: 4 weeks, () Last assessed Progress    11/8/22 DISCONTINUED    11/8/22 DISCONTINUED     11/8/22 DISCONTINUED    11/8/22  DISCONTINUED   Patient will meet predicted functional outcome (FOTO) score: 27% to increase self-worth & perceived functional ability. 11/8/22  ongoing    11/8/22  DISCONTINUED     Patient will increase his gait speed as assessed by the timed  10MWT from 0.89 m/s to 1.03 m/s to increase his safety and (I) with gait at a community level. (MDC for CVA= 0.14 m/s) . 11/29/22 ongoing   The patient will improve his score on the  Functional Gait Assessment (FGA) from 11/30 to 15/30, indicating improved safety and (I) with dynamic,comunity level gait. (Minimal detectable change for stroke= 4.2 points) 11/29/22 ongoing   The patient will demonstrate improved efficiency with functional mobility and decreased risk for falls as evidenced by an increase in his score on the Timed up and Go from 8.99 sec to 6.09 sec. (Minimal detectable change in chronic stroke = 2.9 seconds)  11/29/22 ongoing         Plan      Updated Certification Period: 12/15/2022 to 01/27/23  Recommended Treatment Plan: 2 times per week for 6 weeks:  Gait Training, Manual Therapy, Moist Heat/ Ice, Neuromuscular Re-ed, Orthotic Management and Training, Patient Education, Therapeutic Activities, and Therapeutic Exercise    Recommendations for next session:   Gait better  Strengthening Left knee and stretch calf    Modesta Simon, RASHAD

## 2023-01-20 ENCOUNTER — CLINICAL SUPPORT (OUTPATIENT)
Dept: REHABILITATION | Facility: HOSPITAL | Age: 53
End: 2023-01-20
Payer: MEDICAID

## 2023-01-20 DIAGNOSIS — R26.81 GAIT INSTABILITY: Primary | ICD-10-CM

## 2023-01-20 DIAGNOSIS — Z74.09 IMPAIRED FUNCTIONAL MOBILITY AND ACTIVITY TOLERANCE: ICD-10-CM

## 2023-01-20 DIAGNOSIS — R26.89 BALANCE DISORDER: ICD-10-CM

## 2023-01-20 PROCEDURE — 97110 THERAPEUTIC EXERCISES: CPT | Mod: PN,CQ

## 2023-01-20 PROCEDURE — 97116 GAIT TRAINING THERAPY: CPT | Mod: PN,CQ

## 2023-01-20 NOTE — PROGRESS NOTES
[facility]   Physical Therapy Treatment Note     Name: Jaime Mendoza  Clinic Number: [id number]    Therapy Diagnosis:   Encounter Diagnoses   Name Primary?    Gait instability Yes    Balance disorder     Impaired functional mobility and activity tolerance          Physician: Cathy Quesada NP    Visit Date: 1/20/2023    Physician Orders: PT Eval and Treat  Medical Diagnosis from Referral: R29.898 (ICD-10-CM) - Left leg weakness  Evaluation Date: 11/8/2022  Authorization Period Expiration: 12/31/22  Updated Plan of Care Expiration: 1/27/2023                                Visit # / Visits authorized: 4/12    PTA Visit #: 2/5     Time In: 232  Time Out: 317  Total Billable Time: 45 minutes    SUBJECTIVE     Pt reports: no pain or anything new   He was not compliant with home exercise program, walks and does some stretching  Response to previous treatment: Left hip soreness  Functional change: ongoing    Pain: 0/10   Location:     OBJECTIVE     Objective Measures updated at progress report unless specified.     Treatment     Jaime received the treatments listed below:      therapeutic exercises to develop strength, endurance, range of motion, flexibility for 37 minutes including:   -nustep 10 minute level 5  -calf stretch on half foam roll 30 seconds   -alternating toe taps 2x10   -stepping object low object and 6 in object 2x10   -shuttle leg press single lower extremity   55# left   37#right   37# Heel raises    gait training to improve functional mobility and safety for 8  minutes, including:    Gait without assistive device 2x120 feet with verbal cues to increase Left hip flexion during swing phase.    Patient Education and Home Exercises     Home Exercises Provided and Patient Education Provided     Education provided:   -Patient provided with verbal and demonstrative instruction for all activities performed in today's session.     Written Home Exercises Provided: Patient instructed to  cont prior HEP. See EMR under Patient Instructions for exercises provided during therapy sessions    ASSESSMENT    pt provided good participation and effort during today's session with treatment focused on endurance and lower extremity strengthening and range of motion .       Jaime Is progressing well towards his goals.   Pt prognosis is Good.     Pt will continue to benefit from skilled outpatient physical therapy to address the deficits listed in the problem list box on initial evaluation, provide pt/family education and to maximize pt's level of independence in the home and community environment.     Pt's spiritual, cultural and educational needs considered and pt agreeable to plan of care and goals.     Anticipated barriers to physical therapy: none    GOALS:  SHORT TERM GOALS: 2 weeks, () Last assessed Progress    11/8/22 DISCONTINUED   Patient will be independent with HEP in order to further progress and return to maximal function. 11/8/22 progressing    11/8/22 DISCONTINUED    11/8/22 DISCONTINUED   2. Patient will demonstrate improve endurance and activity tolerance as evidenced by ability to perform Nu-step x 15 minutes without rests breaks. 11/29/22 progressing         LONG TERM GOALS: 4 weeks, () Last assessed Progress    11/8/22 DISCONTINUED    11/8/22 DISCONTINUED     11/8/22 DISCONTINUED    11/8/22  DISCONTINUED   Patient will meet predicted functional outcome (FOTO) score: 27% to increase self-worth & perceived functional ability. 11/8/22  ongoing    11/8/22  DISCONTINUED     Patient will increase his gait speed as assessed by the timed 10MWT from 0.89 m/s to 1.03 m/s to increase his safety and (I) with gait at a community level. (MDC for CVA= 0.14 m/s) . 11/29/22 ongoing   The patient will improve his score on the  Functional Gait Assessment (FGA) from 11/30 to 15/30, indicating improved safety and (I) with dynamic,comunity level gait. (Minimal detectable change for stroke= 4.2 points) 11/29/22  ongoing   The patient will demonstrate improved efficiency with functional mobility and decreased risk for falls as evidenced by an increase in his score on the Timed up and Go from 8.99 sec to 6.09 sec. (Minimal detectable change in chronic stroke = 2.9 seconds)  11/29/22 ongoing         Plan      Updated Certification Period: 12/15/2022 to 01/27/23  Recommended Treatment Plan: 2 times per week for 6 weeks:  Gait Training, Manual Therapy, Moist Heat/ Ice, Neuromuscular Re-ed, Orthotic Management and Training, Patient Education, Therapeutic Activities, and Therapeutic Exercise    Recommendations for next session:     Gait better  Strengthening Left knee and stretch calf    Modesta Simon, PTA

## 2023-01-27 ENCOUNTER — CLINICAL SUPPORT (OUTPATIENT)
Dept: REHABILITATION | Facility: HOSPITAL | Age: 53
End: 2023-01-27
Payer: MEDICAID

## 2023-01-27 DIAGNOSIS — Z74.09 IMPAIRED FUNCTIONAL MOBILITY AND ACTIVITY TOLERANCE: ICD-10-CM

## 2023-01-27 DIAGNOSIS — R26.81 GAIT INSTABILITY: Primary | ICD-10-CM

## 2023-01-27 DIAGNOSIS — R26.89 BALANCE DISORDER: ICD-10-CM

## 2023-01-27 PROCEDURE — 97110 THERAPEUTIC EXERCISES: CPT | Mod: PN

## 2023-01-27 NOTE — PLAN OF CARE
"[facility]   Physical Therapy Discharge Summary    Name: Jaime Mendoza  Clinic Number: [id number]    Therapy Diagnosis:   Encounter Diagnoses   Name Primary?    Gait instability Yes    Balance disorder     Impaired functional mobility and activity tolerance        Physician: Cathy Quesada NP    Visit Date: 2023    Physician Orders: PT Eval and Treat  Medical Diagnosis from Referral: R29.898 (ICD-10-CM) - Left leg weakness  Evaluation Date: 2022  Authorization Period Expiration: 22  Updated Plan of Care Expiration: 2023                           Visit # / Visits authorized:     PTA Visit #: 0/5     Time In: 1:46 pm   Time Out: 2:25  Total Billable Time: 39 minutes    SUBJECTIVE     Pt reports: "my right knee angelica sometimes."    He was compliant with home exercise program, walks and does some stretching.  Response to previous treatment: Left hip soreness  Functional change: ongoing    Pain: 0/10    Location:     OBJECTIVE     Objective Measures updated at progress report unless specified.     Treatment     Jaime received the treatments listed below:      therapeutic activities to improve functional performance for 24  minutes, includin/29/22 12/15/2022    1/27/23   TUG 8.99 seconds no assistive device  9.52  8.12 11 seconds no assistive device    Self selected walking speed (10MWT)  0.89 m/sec (6m/6.76s)  no AD.  0.84 m/sec (6m/7.12s)  no AD.  0.85 m/s (6/7.1 sec)    FGA  no assistive device   no assistive device   no assistive device         Functional Gait Assessment:   1. Gait on level surface =  2   (3) Normal: less than 5.5 sec, no A.D., no imbalance, normal gait pattern, deviates< 6in   (2) Mild impairment: 7-5.6 sec, uses A.D., mild gait deviations, or deviates 6-10 in   (1) Moderate impairment: > 7 sec, slow speed, imbalance, deviates 10-15 in.   (0) Severe impairment: needs assist, deviates >15 in, reach/touch wall  2. " Change in Gait Speed = 2   (3) Normal: smooth change w/o loss of balance or gait deviation, deviates < 6 in, significant difference between speeds   (2) Mild impairment: changes speed, but demonstrates mild gait deviations, deviates 6-10 in, OR no deviations but unable to significantly speed, OR uses A.D.   (1) Moderate impairment: minor changes to speed, OR changes speed w/ significant deviations, deviates 10-15 in, OR  Changes speed , but loses balance & recovers   (0) Severe impairment: cannot change speed, deviates >15 in, or loses balance & needs assist  3. Gait with horizontal head turns  = 2   (3) Normal: no change in gait, deviates <6 in   (2) Mild impairment: slight change in speed, deviates 6-10 in, OR uses A.D.   (1) Moderate impairment: moderate change in speed, deviates 10-15 in   (0) Severe impairment: severe disruption of gait, deviates >15in  4. Gait with vertical head turns = 2   (3) Normal: no change in gait, deviates <6 in   (2) Mild impairment: slight change in speed, deviates 6-10 in OR uses A.D.   (1) Moderate impairment: moderate change in speed, deviates 10-15 in   (0) Severe impairment: severe disruption of gait, deviates >15 in  5. Gait with pivot turns = 2   (3) Normal: performs safely in 3 sec, no LOB   (2) Mild impairment: performs in >3 sec & no LOB, OR turns safely & requires several steps to regain LOB   (1) Moderate impairment: turns slow, OR requires several small steps for balance following turn & stop   (0) Severe impairment: cannot turn safely, needs assist  6. Step over obstacle = 1   (3) Normal: steps over 2 stacked boxes w/o change in speed or LOB   (2) Mild impairment: able to step over 1 box w/o change in speed or LOB   (1) Moderate impairment: steps over 1 box but must slow down, may require VC   (0) Severe impairment: cannot perform w/o assist  7. Gait with Narrow ELLEN = 0   (3) Normal: 10 steps no staggering   (2) Mild impairment: 7-9 steps   (1) Moderate impairment: 4-7  steps   (0) Severe impairment: < 4 steps or cannot perform w/o assist  8. Gait with eyes closed = 1   (3) Normal: < 7 sec, no A.D., no LOB, normal gait pattern, deviates <6 in   (2) Mild impairment: 7.1-9 sec, mild gait deviations, deviates 6-10 in   (1) Moderate impairment: > 9 sec, abnormal pattern, LOB, deviates 10-15 in   (0) Severe impairment: cannot perform w/o assist, LOB, deviates >15in  9. Ambulating Backwards = 2   (3) Normal: no A.D., no LOB, normal gait pattern, deviates <6in   (2) Mild impairment: uses A.D., slower speed, mild gait deviations, deviates 6-10 in   (1) Moderate impairment: slow speed, abnormal gait pattern, LOB, deviates 10-15 in   (0) Severe impairment: severe gait deviations or LOB, deviates >15in  10. Steps = 2   (3) Normal: alternating feet, no rail   (2) Mild Impairment: alternating feet, uses rail   (1) Moderate impairment: step-to, uses rail   (0) Severe impairment: cannot perform safely    Score 16/30     Score:   <22/30 fall risk   <20/30 fall risk in older adults   <18/30 fall risk in Parkinsons        therapeutic exercises to develop strength, endurance, range of motion, flexibility for 15 minutes including:   -nustep 15 minute level 4  bilateral upper extremity / lower extremity       Patient Education and Home Exercises     Home Exercises Provided and Patient Education Provided     Education provided:   Educated about progress. Reports understanding. Pt educated to continue with HEP at home.     Written Home Exercises Provided: Patient instructed to cont prior HEP. See EMR under Patient Instructions for exercises provided during therapy sessions    ASSESSMENT      Pt tolerated session well.  Plan of care and reassessment of goals today. Pt met 2/2 short term goals and 1/4 long term goals. Pt has improved in his Functional Gait Assessment score but still remains in a fall risk category. Patient's left leg still has increased tone and spasticity causing his toe to catch the  ground occasionally. Pt is unable to try the Bioness L300 as he has active skin cancer at this time (contraindicated). Have discussed BOTOX and how it may be something worth discussing wit his team of doctors. Pt reports he feels he has plateaued at this time and would like to take a break from therapy. Therapist agrees. Pt is discharged from therapy at this time with HEP.       GOALS:    SHORT TERM GOALS: 2 weeks Last assessed Progress    11/8/22 DISCONTINUED   Patient will be independent with HEP in order to further progress and return to maximal function. 1/27/23 MET    11/8/22 DISCONTINUED    11/8/22 DISCONTINUED   2. Patient will demonstrate improve endurance and activity tolerance as evidenced by ability to perform Nu-step x 15 minutes without rests breaks. 1/27/23 MET         LONG TERM GOALS: 4 weeks Last assessed Progress    11/8/22 DISCONTINUED    11/8/22 DISCONTINUED     11/8/22 DISCONTINUED    11/8/22  DISCONTINUED   Patient will meet predicted functional outcome (FOTO) score: 27% to increase self-worth & perceived functional ability. 11/8/22  NOT MET    11/8/22  DISCONTINUED     Patient will increase his gait speed as assessed by the timed 10MWT from 0.89 m/s to 1.03 m/s to increase his safety and (I) with gait at a community level. (MDC for CVA= 0.14 m/s) . 1/27/23 NOT MET   The patient will improve his score on the  Functional Gait Assessment (FGA) from 11/30 to 15/30, indicating improved safety and (I) with dynamic,comunity level gait. (Minimal detectable change for stroke= 4.2 points) 1/27/23 MET   The patient will demonstrate improved efficiency with functional mobility and decreased risk for falls as evidenced by an increase in his score on the Timed up and Go from 8.99 sec to 6.09 sec. (Minimal detectable change in chronic stroke = 2.9 seconds)  1/27/23 NOT MET         Plan   Pt to be discharged from physical therapy at this time with HEP.     Fiona Rios, PT

## 2023-03-28 DIAGNOSIS — R73.03 PREDIABETES: Primary | ICD-10-CM

## 2023-03-28 DIAGNOSIS — E78.00 ELEVATED LDL CHOLESTEROL LEVEL: ICD-10-CM

## 2023-03-28 NOTE — TELEPHONE ENCOUNTER
----- Message from Julissa Mcmullen sent at 3/28/2023  3:15 PM CDT -----  Regarding: med refills/orders  Patient rescheduled from 04/06/23 to 05/18/23 would like lab orders sent to [facility] if needed and patient need refills on Lipitor 10 mg sent to [facility]. Patient would like a call back.

## 2023-03-29 RX ORDER — ATORVASTATIN CALCIUM 10 MG/1
10 TABLET, FILM COATED ORAL DAILY
Qty: 90 TABLET | Refills: 0 | Status: SHIPPED | OUTPATIENT
Start: 2023-03-29 | End: 2023-05-18 | Stop reason: SDUPTHER

## 2023-05-12 ENCOUNTER — LAB VISIT (OUTPATIENT)
Dept: LAB | Facility: HOSPITAL | Age: 53
End: 2023-05-12
Attending: NURSE PRACTITIONER
Payer: MEDICAID

## 2023-05-12 DIAGNOSIS — E78.00 ELEVATED LDL CHOLESTEROL LEVEL: ICD-10-CM

## 2023-05-12 DIAGNOSIS — R73.03 PREDIABETES: ICD-10-CM

## 2023-05-12 LAB
ALBUMIN SERPL BCP-MCNC: 4.1 G/DL (ref 3.5–5.2)
ALP SERPL-CCNC: 112 U/L (ref 55–135)
ALT SERPL W/O P-5'-P-CCNC: 16 U/L (ref 10–44)
ANION GAP SERPL CALC-SCNC: 6 MMOL/L (ref 8–16)
AST SERPL-CCNC: 15 U/L (ref 10–40)
BASOPHILS # BLD AUTO: 0.08 K/UL (ref 0–0.2)
BASOPHILS NFR BLD: 0.8 % (ref 0–1.9)
BILIRUB SERPL-MCNC: 0.6 MG/DL (ref 0.1–1)
BUN SERPL-MCNC: 13 MG/DL (ref 6–20)
CALCIUM SERPL-MCNC: 9.5 MG/DL (ref 8.7–10.5)
CHLORIDE SERPL-SCNC: 105 MMOL/L (ref 95–110)
CHOLEST SERPL-MCNC: 147 MG/DL (ref 120–199)
CHOLEST/HDLC SERPL: 3.5 {RATIO} (ref 2–5)
CO2 SERPL-SCNC: 28 MMOL/L (ref 23–29)
CREAT SERPL-MCNC: 0.9 MG/DL (ref 0.5–1.4)
DIFFERENTIAL METHOD: NORMAL
EOSINOPHIL # BLD AUTO: 0.3 K/UL (ref 0–0.5)
EOSINOPHIL NFR BLD: 3.1 % (ref 0–8)
ERYTHROCYTE [DISTWIDTH] IN BLOOD BY AUTOMATED COUNT: 12.7 % (ref 11.5–14.5)
EST. GFR  (NO RACE VARIABLE): >60 ML/MIN/1.73 M^2
ESTIMATED AVG GLUCOSE: 123 MG/DL (ref 68–131)
GLUCOSE SERPL-MCNC: 125 MG/DL (ref 70–110)
HBA1C MFR BLD: 5.9 % (ref 4–5.6)
HCT VFR BLD AUTO: 47.9 % (ref 40–54)
HDLC SERPL-MCNC: 42 MG/DL (ref 40–75)
HDLC SERPL: 28.6 % (ref 20–50)
HGB BLD-MCNC: 15.4 G/DL (ref 14–18)
IMM GRANULOCYTES # BLD AUTO: 0.03 K/UL (ref 0–0.04)
IMM GRANULOCYTES NFR BLD AUTO: 0.3 % (ref 0–0.5)
LDLC SERPL CALC-MCNC: 79.4 MG/DL (ref 63–159)
LYMPHOCYTES # BLD AUTO: 3.5 K/UL (ref 1–4.8)
LYMPHOCYTES NFR BLD: 33.8 % (ref 18–48)
MCH RBC QN AUTO: 30.9 PG (ref 27–31)
MCHC RBC AUTO-ENTMCNC: 32.2 G/DL (ref 32–36)
MCV RBC AUTO: 96 FL (ref 82–98)
MONOCYTES # BLD AUTO: 0.8 K/UL (ref 0.3–1)
MONOCYTES NFR BLD: 8.1 % (ref 4–15)
NEUTROPHILS # BLD AUTO: 5.6 K/UL (ref 1.8–7.7)
NEUTROPHILS NFR BLD: 53.9 % (ref 38–73)
NONHDLC SERPL-MCNC: 105 MG/DL
NRBC BLD-RTO: 0 /100 WBC
PLATELET # BLD AUTO: 357 K/UL (ref 150–450)
PMV BLD AUTO: 10.3 FL (ref 9.2–12.9)
POTASSIUM SERPL-SCNC: 4.3 MMOL/L (ref 3.5–5.1)
PROT SERPL-MCNC: 6.7 G/DL (ref 6–8.4)
RBC # BLD AUTO: 4.99 M/UL (ref 4.6–6.2)
SODIUM SERPL-SCNC: 139 MMOL/L (ref 136–145)
TRIGL SERPL-MCNC: 128 MG/DL (ref 30–150)
WBC # BLD AUTO: 10.4 K/UL (ref 3.9–12.7)

## 2023-05-12 PROCEDURE — 85025 COMPLETE CBC W/AUTO DIFF WBC: CPT | Performed by: NURSE PRACTITIONER

## 2023-05-12 PROCEDURE — 80053 COMPREHEN METABOLIC PANEL: CPT | Performed by: NURSE PRACTITIONER

## 2023-05-12 PROCEDURE — 83036 HEMOGLOBIN GLYCOSYLATED A1C: CPT | Performed by: NURSE PRACTITIONER

## 2023-05-12 PROCEDURE — 36415 COLL VENOUS BLD VENIPUNCTURE: CPT | Mod: PO | Performed by: NURSE PRACTITIONER

## 2023-05-12 PROCEDURE — 80061 LIPID PANEL: CPT | Performed by: NURSE PRACTITIONER

## 2023-05-15 ENCOUNTER — PATIENT MESSAGE (OUTPATIENT)
Dept: FAMILY MEDICINE | Facility: CLINIC | Age: 53
End: 2023-05-15

## 2023-05-17 NOTE — PROGRESS NOTES
SUBJECTIVE:      Patient ID: Jaime Mendoza is a 53 y.o. male.    Chief Complaint: prediabetes and Referral (Vascular surgeon and neurocare)    Mr Mendoza is here today to f/u on prediabetes and hyperlipidemia. He is doing well. Asking for a referral to vascular surgeon for varicose veins and new referral to surgeon for his hernia. He is following with derm for skin cancer, will request records     Hyperlipidemia  This is a chronic problem. The current episode started more than 1 year ago. The problem is controlled. Recent lipid tests were reviewed and are normal. Exacerbating diseases include diabetes. Pertinent negatives include no chest pain or shortness of breath. Current antihyperlipidemic treatment includes diet change, exercise and statins. The current treatment provides significant improvement of lipids.   Diabetes  He presents for his follow-up (prediabetes) diabetic visit. His disease course has been stable. There are no hypoglycemic associated symptoms. Pertinent negatives for hypoglycemia include no confusion, dizziness, headaches, nervousness/anxiousness, pallor, seizures or speech difficulty. There are no diabetic associated symptoms. Pertinent negatives for diabetes include no chest pain, no polydipsia, no polyphagia, no polyuria and no weakness. There are no hypoglycemic complications. Symptoms are stable. There are no diabetic complications. Risk factors for coronary artery disease include diabetes mellitus, male sex and dyslipidemia. Current diabetic treatment includes diet. He is following a generally healthy diet. When asked about meal planning, he reported none.     Past Surgical History:   Procedure Laterality Date    EYE SURGERY      KNEE SURGERY      left arm fused  1987    TONSILLECTOMY       Family History   Problem Relation Age of Onset    Hypertension Father     Diabetes Father       Social History     Socioeconomic History    Marital status:    Tobacco Use    Smoking status:  Every Day     Types: Cigars     Passive exposure: Past    Smokeless tobacco: Never   Substance and Sexual Activity    Alcohol use: No    Drug use: Not Currently     Comment: alcoholic  clean for 9 years    Sexual activity: Yes     Partners: Female     Social Determinants of Health     Financial Resource Strain: Low Risk     Difficulty of Paying Living Expenses: Not very hard   Food Insecurity: No Food Insecurity    Worried About Running Out of Food in the Last Year: Never true    Ran Out of Food in the Last Year: Never true   Transportation Needs: No Transportation Needs    Lack of Transportation (Medical): No    Lack of Transportation (Non-Medical): No   Physical Activity: Inactive    Days of Exercise per Week: 0 days    Minutes of Exercise per Session: 0 min   Stress: No Stress Concern Present    Feeling of Stress : Not at all   Social Connections: Unknown    Frequency of Communication with Friends and Family: More than three times a week    Frequency of Social Gatherings with Friends and Family: More than three times a week    Active Member of Clubs or Organizations: Yes    Attends Club or Organization Meetings: More than 4 times per year    Marital Status:    Housing Stability: Low Risk     Unable to Pay for Housing in the Last Year: No    Number of Places Lived in the Last Year: 1    Unstable Housing in the Last Year: No     Current Outpatient Medications   Medication Sig Dispense Refill    ibuprofen (ADVIL,MOTRIN) 800 MG tablet Take 800 mg by mouth 3 (three) times daily. for ten days      atorvastatin (LIPITOR) 10 MG tablet Take 1 tablet (10 mg total) by mouth once daily. 90 tablet 1     Current Facility-Administered Medications   Medication Dose Route Frequency Provider Last Rate Last Admin    albuterol nebulizer solution 2.5 mg  2.5 mg Nebulization 1 time in Clinic/HOD         ipratropium 0.02 % nebulizer solution 0.5 mg  0.5 mg Nebulization 1 time in Clinic/HOD          Review of patient's allergies  "indicates:  No Known Allergies   Past Medical History:   Diagnosis Date    Alcoholic     Head injury, closed     Hyperglycemia      Past Surgical History:   Procedure Laterality Date    EYE SURGERY      KNEE SURGERY      left arm fused  1987    TONSILLECTOMY         Review of Systems   Constitutional:  Negative for activity change, appetite change, chills, diaphoresis and unexpected weight change.   HENT:  Negative for ear discharge, facial swelling, hearing loss, nosebleeds, rhinorrhea and trouble swallowing.    Eyes:  Negative for photophobia, pain, discharge and visual disturbance.   Respiratory:  Negative for apnea, choking, chest tightness, shortness of breath and wheezing.    Cardiovascular:  Negative for chest pain and palpitations.   Gastrointestinal:  Negative for abdominal pain, blood in stool, constipation, diarrhea and vomiting.   Endocrine: Negative for polydipsia, polyphagia and polyuria.   Genitourinary:  Negative for difficulty urinating, hematuria and urgency.   Musculoskeletal:  Negative for arthralgias, gait problem, joint swelling and neck pain.   Skin:  Negative for pallor.   Neurological:  Negative for dizziness, seizures, speech difficulty, weakness and headaches.   Hematological:  Does not bruise/bleed easily.   Psychiatric/Behavioral:  Negative for agitation, confusion, dysphoric mood, self-injury, sleep disturbance and suicidal ideas. The patient is not nervous/anxious.     OBJECTIVE:      Vitals:    05/18/23 0946   BP: 120/80   Pulse: 76   Temp: 98.2 °F (36.8 °C)   SpO2: 98%   Weight: 99.8 kg (220 lb)   Height: 6' 1" (1.854 m)     Physical Exam  Vitals and nursing note reviewed.   Constitutional:       General: He is not in acute distress.     Appearance: He is well-developed.   HENT:      Head: Normocephalic and atraumatic.      Nose: Nose normal.      Mouth/Throat:      Pharynx: Uvula midline.   Eyes:      General: Lids are normal.      Conjunctiva/sclera: Conjunctivae normal.      " Pupils: Pupils are equal, round, and reactive to light.      Right eye: Pupil is round and reactive.      Left eye: Pupil is round and reactive.   Neck:      Thyroid: No thyromegaly.      Vascular: No carotid bruit.   Cardiovascular:      Rate and Rhythm: Normal rate and regular rhythm.      Pulses: Normal pulses.      Heart sounds: Normal heart sounds. No murmur heard.  Pulmonary:      Effort: Pulmonary effort is normal.      Breath sounds: Normal breath sounds. No wheezing, rhonchi or rales.   Abdominal:      General: Bowel sounds are normal.      Palpations: Abdomen is soft. Abdomen is not rigid.      Tenderness: There is no abdominal tenderness.      Hernia: A hernia is present. Hernia is present in the umbilical area.   Musculoskeletal:         General: Normal range of motion.      Cervical back: Normal range of motion and neck supple.      Right lower leg: No edema.      Left lower leg: No edema.   Lymphadenopathy:      Cervical: No cervical adenopathy.   Skin:     General: Skin is warm and dry.      Nails: There is no clubbing.      Comments: Varicose veins bilat   Neurological:      Mental Status: He is alert and oriented to person, place, and time.   Psychiatric:         Mood and Affect: Mood normal.         Speech: Speech normal.         Behavior: Behavior normal. Behavior is cooperative.         Thought Content: Thought content normal.         Judgment: Judgment normal.      Lab Visit on 05/12/2023   Component Date Value Ref Range Status    WBC 05/12/2023 10.40  3.90 - 12.70 K/uL Final    RBC 05/12/2023 4.99  4.60 - 6.20 M/uL Final    Hemoglobin 05/12/2023 15.4  14.0 - 18.0 g/dL Final    Hematocrit 05/12/2023 47.9  40.0 - 54.0 % Final    MCV 05/12/2023 96  82 - 98 fL Final    MCH 05/12/2023 30.9  27.0 - 31.0 pg Final    MCHC 05/12/2023 32.2  32.0 - 36.0 g/dL Final    RDW 05/12/2023 12.7  11.5 - 14.5 % Final    Platelets 05/12/2023 357  150 - 450 K/uL Final    MPV 05/12/2023 10.3  9.2 - 12.9 fL Final     Immature Granulocytes 05/12/2023 0.3  0.0 - 0.5 % Final    Gran # (ANC) 05/12/2023 5.6  1.8 - 7.7 K/uL Final    Immature Grans (Abs) 05/12/2023 0.03  0.00 - 0.04 K/uL Final    Comment: Mild elevation in immature granulocytes is non specific and   can be seen in a variety of conditions including stress response,   acute inflammation, trauma and pregnancy. Correlation with other   laboratory and clinical findings is essential.      Lymph # 05/12/2023 3.5  1.0 - 4.8 K/uL Final    Mono # 05/12/2023 0.8  0.3 - 1.0 K/uL Final    Eos # 05/12/2023 0.3  0.0 - 0.5 K/uL Final    Baso # 05/12/2023 0.08  0.00 - 0.20 K/uL Final    nRBC 05/12/2023 0  0 /100 WBC Final    Gran % 05/12/2023 53.9  38.0 - 73.0 % Final    Lymph % 05/12/2023 33.8  18.0 - 48.0 % Final    Mono % 05/12/2023 8.1  4.0 - 15.0 % Final    Eosinophil % 05/12/2023 3.1  0.0 - 8.0 % Final    Basophil % 05/12/2023 0.8  0.0 - 1.9 % Final    Differential Method 05/12/2023 Automated   Final    Sodium 05/12/2023 139  136 - 145 mmol/L Final    Potassium 05/12/2023 4.3  3.5 - 5.1 mmol/L Final    Chloride 05/12/2023 105  95 - 110 mmol/L Final    CO2 05/12/2023 28  23 - 29 mmol/L Final    Glucose 05/12/2023 125 (H)  70 - 110 mg/dL Final    BUN 05/12/2023 13  6 - 20 mg/dL Final    Creatinine 05/12/2023 0.9  0.5 - 1.4 mg/dL Final    Calcium 05/12/2023 9.5  8.7 - 10.5 mg/dL Final    Total Protein 05/12/2023 6.7  6.0 - 8.4 g/dL Final    Albumin 05/12/2023 4.1  3.5 - 5.2 g/dL Final    Total Bilirubin 05/12/2023 0.6  0.1 - 1.0 mg/dL Final    Comment: For infants and newborns, interpretation of results should be based  on gestational age, weight and in agreement with clinical  observations.    Premature Infant recommended reference ranges:  Up to 24 hours.............<8.0 mg/dL  Up to 48 hours............<12.0 mg/dL  3-5 days..................<15.0 mg/dL  6-29 days.................<15.0 mg/dL      Alkaline Phosphatase 05/12/2023 112  55 - 135 U/L Final    AST 05/12/2023 15  10 - 40  U/L Final    ALT 05/12/2023 16  10 - 44 U/L Final    Anion Gap 05/12/2023 6 (L)  8 - 16 mmol/L Final    eGFR 05/12/2023 >60.0  >60 mL/min/1.73 m^2 Final    Cholesterol 05/12/2023 147  120 - 199 mg/dL Final    Comment: The National Cholesterol Education Program (NCEP) has set the  following guidelines (reference ranges) for Cholesterol:  Optimal.....................<200 mg/dL  Borderline High.............200-239 mg/dL  High........................> or = 240 mg/dL      Triglycerides 05/12/2023 128  30 - 150 mg/dL Final    Comment: The National Cholesterol Education Program (NCEP) has set the  following guidelines (reference values) for triglycerides:  Normal......................<150 mg/dL  Borderline High.............150-199 mg/dL  High........................200-499 mg/dL      HDL 05/12/2023 42  40 - 75 mg/dL Final    Comment: The National Cholesterol Education Program (NCEP) has set the  following guidelines (reference values) for HDL Cholesterol:  Low...............<40 mg/dL  Optimal...........>60 mg/dL      LDL Cholesterol 05/12/2023 79.4  63.0 - 159.0 mg/dL Final    Comment: The National Cholesterol Education Program (NCEP) has set the  following guidelines (reference values) for LDL Cholesterol:  Optimal.......................<130 mg/dL  Borderline High...............130-159 mg/dL  High..........................160-189 mg/dL  Very High.....................>190 mg/dL      HDL/Cholesterol Ratio 05/12/2023 28.6  20.0 - 50.0 % Final    Total Cholesterol/HDL Ratio 05/12/2023 3.5  2.0 - 5.0 Final    Non-HDL Cholesterol 05/12/2023 105  mg/dL Final    Comment: Risk category and Non-HDL cholesterol goals:  Coronary heart disease (CHD)or equivalent (10-year risk of CHD >20%):  Non-HDL cholesterol goal     <130 mg/dL  Two or more CHD risk factors and 10-year risk of CHD <= 20%:  Non-HDL cholesterol goal     <160 mg/dL  0 to 1 CHD risk factor:  Non-HDL cholesterol goal     <190 mg/dL      Hemoglobin A1C 05/12/2023 5.9 (H)   4.0 - 5.6 % Final    Comment: ADA Screening Guidelines:  5.7-6.4%  Consistent with prediabetes  >or=6.5%  Consistent with diabetes    High levels of fetal hemoglobin interfere with the HbA1C  assay. Heterozygous hemoglobin variants (HbS, HgC, etc)do  not significantly interfere with this assay.   However, presence of multiple variants may affect accuracy.      Estimated Avg Glucose 05/12/2023 123  68 - 131 mg/dL Final   ]  Assessment:       1. Prediabetes    2. History of colon polyps    3. Mixed hyperlipidemia    4. Varicose veins of both lower extremities, unspecified whether complicated    5. Umbilical hernia without obstruction and without gangrene        Plan:       Prediabetes  Stable with diet and exercise    History of colon polyps  -     Ambulatory referral/consult to Gastroenterology; Future; Expected date: 05/25/2023    Mixed hyperlipidemia  -     atorvastatin (LIPITOR) 10 MG tablet; Take 1 tablet (10 mg total) by mouth once daily.  Dispense: 90 tablet; Refill: 1    Varicose veins of both lower extremities, unspecified whether complicated  -     Ambulatory referral/consult to Vascular Surgery; Future; Expected date: 05/25/2023  Compression stockings encouraged    Umbilical hernia without obstruction and without gangrene  -     Ambulatory referral/consult to General Surgery; Future; Expected date: 05/25/2023        Follow up in about 6 months (around 11/18/2023) for hyperlipidemia .      5/18/2023 CARMEN Jeronimo, FNP

## 2023-05-18 ENCOUNTER — OFFICE VISIT (OUTPATIENT)
Dept: FAMILY MEDICINE | Facility: CLINIC | Age: 53
End: 2023-05-18
Payer: MEDICAID

## 2023-05-18 ENCOUNTER — TELEPHONE (OUTPATIENT)
Dept: FAMILY MEDICINE | Facility: CLINIC | Age: 53
End: 2023-05-18

## 2023-05-18 VITALS
HEIGHT: 73 IN | BODY MASS INDEX: 29.16 KG/M2 | HEART RATE: 76 BPM | OXYGEN SATURATION: 98 % | SYSTOLIC BLOOD PRESSURE: 120 MMHG | WEIGHT: 220 LBS | TEMPERATURE: 98 F | DIASTOLIC BLOOD PRESSURE: 80 MMHG

## 2023-05-18 DIAGNOSIS — K42.9 UMBILICAL HERNIA WITHOUT OBSTRUCTION AND WITHOUT GANGRENE: ICD-10-CM

## 2023-05-18 DIAGNOSIS — I83.93 VARICOSE VEINS OF BOTH LOWER EXTREMITIES, UNSPECIFIED WHETHER COMPLICATED: Primary | ICD-10-CM

## 2023-05-18 DIAGNOSIS — Z86.010 HISTORY OF COLON POLYPS: ICD-10-CM

## 2023-05-18 DIAGNOSIS — E78.2 MIXED HYPERLIPIDEMIA: ICD-10-CM

## 2023-05-18 DIAGNOSIS — R73.03 PREDIABETES: Primary | ICD-10-CM

## 2023-05-18 DIAGNOSIS — I83.93 VARICOSE VEINS OF BOTH LOWER EXTREMITIES, UNSPECIFIED WHETHER COMPLICATED: ICD-10-CM

## 2023-05-18 PROCEDURE — 3044F HG A1C LEVEL LT 7.0%: CPT | Mod: CPTII,S$GLB,, | Performed by: NURSE PRACTITIONER

## 2023-05-18 PROCEDURE — 1160F RVW MEDS BY RX/DR IN RCRD: CPT | Mod: CPTII,S$GLB,, | Performed by: NURSE PRACTITIONER

## 2023-05-18 PROCEDURE — 1159F MED LIST DOCD IN RCRD: CPT | Mod: CPTII,S$GLB,, | Performed by: NURSE PRACTITIONER

## 2023-05-18 PROCEDURE — 99214 PR OFFICE/OUTPT VISIT, EST, LEVL IV, 30-39 MIN: ICD-10-PCS | Mod: S$GLB,,, | Performed by: NURSE PRACTITIONER

## 2023-05-18 PROCEDURE — 99214 OFFICE O/P EST MOD 30 MIN: CPT | Mod: S$GLB,,, | Performed by: NURSE PRACTITIONER

## 2023-05-18 PROCEDURE — 1160F PR REVIEW ALL MEDS BY PRESCRIBER/CLIN PHARMACIST DOCUMENTED: ICD-10-PCS | Mod: CPTII,S$GLB,, | Performed by: NURSE PRACTITIONER

## 2023-05-18 PROCEDURE — 3008F PR BODY MASS INDEX (BMI) DOCUMENTED: ICD-10-PCS | Mod: CPTII,S$GLB,, | Performed by: NURSE PRACTITIONER

## 2023-05-18 PROCEDURE — 3074F PR MOST RECENT SYSTOLIC BLOOD PRESSURE < 130 MM HG: ICD-10-PCS | Mod: CPTII,S$GLB,, | Performed by: NURSE PRACTITIONER

## 2023-05-18 PROCEDURE — 3008F BODY MASS INDEX DOCD: CPT | Mod: CPTII,S$GLB,, | Performed by: NURSE PRACTITIONER

## 2023-05-18 PROCEDURE — 3044F PR MOST RECENT HEMOGLOBIN A1C LEVEL <7.0%: ICD-10-PCS | Mod: CPTII,S$GLB,, | Performed by: NURSE PRACTITIONER

## 2023-05-18 PROCEDURE — 3074F SYST BP LT 130 MM HG: CPT | Mod: CPTII,S$GLB,, | Performed by: NURSE PRACTITIONER

## 2023-05-18 PROCEDURE — 1159F PR MEDICATION LIST DOCUMENTED IN MEDICAL RECORD: ICD-10-PCS | Mod: CPTII,S$GLB,, | Performed by: NURSE PRACTITIONER

## 2023-05-18 PROCEDURE — 3079F DIAST BP 80-89 MM HG: CPT | Mod: CPTII,S$GLB,, | Performed by: NURSE PRACTITIONER

## 2023-05-18 PROCEDURE — 3079F PR MOST RECENT DIASTOLIC BLOOD PRESSURE 80-89 MM HG: ICD-10-PCS | Mod: CPTII,S$GLB,, | Performed by: NURSE PRACTITIONER

## 2023-05-18 RX ORDER — ATORVASTATIN CALCIUM 10 MG/1
10 TABLET, FILM COATED ORAL DAILY
Qty: 90 TABLET | Refills: 1 | Status: SHIPPED | OUTPATIENT
Start: 2023-05-18 | End: 2023-11-20 | Stop reason: SDUPTHER

## 2023-05-18 RX ORDER — IBUPROFEN 800 MG/1
800 TABLET ORAL 3 TIMES DAILY
COMMUNITY
Start: 2023-05-02 | End: 2023-11-20

## 2023-05-18 NOTE — TELEPHONE ENCOUNTER
----- Message from Pippa Aguirre RN sent at 5/18/2023  2:06 PM CDT -----  Good afternoon,   I just received your referral to Dr. Love for varicose veins. Dr. Love does not see patients for this problem. The referral should be to either Dr. GERRY Collins or Dr. Sanchez at the vein clinic at [facility].  Pippa Aguirre RN

## 2023-06-28 ENCOUNTER — OFFICE VISIT (OUTPATIENT)
Dept: SURGERY | Facility: CLINIC | Age: 53
End: 2023-06-28
Payer: MEDICAID

## 2023-06-28 VITALS — TEMPERATURE: 98 F | HEART RATE: 102 BPM | DIASTOLIC BLOOD PRESSURE: 81 MMHG | SYSTOLIC BLOOD PRESSURE: 118 MMHG

## 2023-06-28 DIAGNOSIS — K42.9 UMBILICAL HERNIA WITHOUT OBSTRUCTION AND WITHOUT GANGRENE: Primary | ICD-10-CM

## 2023-06-28 PROCEDURE — 3044F PR MOST RECENT HEMOGLOBIN A1C LEVEL <7.0%: ICD-10-PCS | Mod: CPTII,S$GLB,, | Performed by: SURGERY

## 2023-06-28 PROCEDURE — 3079F DIAST BP 80-89 MM HG: CPT | Mod: CPTII,S$GLB,, | Performed by: SURGERY

## 2023-06-28 PROCEDURE — 3079F PR MOST RECENT DIASTOLIC BLOOD PRESSURE 80-89 MM HG: ICD-10-PCS | Mod: CPTII,S$GLB,, | Performed by: SURGERY

## 2023-06-28 PROCEDURE — 3044F HG A1C LEVEL LT 7.0%: CPT | Mod: CPTII,S$GLB,, | Performed by: SURGERY

## 2023-06-28 PROCEDURE — 99203 OFFICE O/P NEW LOW 30 MIN: CPT | Mod: S$GLB,,, | Performed by: SURGERY

## 2023-06-28 PROCEDURE — 99203 PR OFFICE/OUTPT VISIT, NEW, LEVL III, 30-44 MIN: ICD-10-PCS | Mod: S$GLB,,, | Performed by: SURGERY

## 2023-06-28 PROCEDURE — 3074F PR MOST RECENT SYSTOLIC BLOOD PRESSURE < 130 MM HG: ICD-10-PCS | Mod: CPTII,S$GLB,, | Performed by: SURGERY

## 2023-06-28 PROCEDURE — 3074F SYST BP LT 130 MM HG: CPT | Mod: CPTII,S$GLB,, | Performed by: SURGERY

## 2023-06-30 NOTE — H&P
GENERAL SURGERY  OUTPATIENT H&P    REASON FOR VISIT/CC: Umbilical hernia    HPI: Jaime Mendoza is a 53 y.o. male here for evaluation of umbilical hernia. Patient was at a chronic small bulge at his umbilicus. Maybe enlarging therefore requested evaluation by General surgery. No previous surgical history at the site. No severe pain or overlying skin changes. No ulceration or drainage from the site.    I have reviewed the patient's chart including prior progress notes, procedures and testing.     ROS:   Review of Systems    PROBLEM LIST:  Patient Active Problem List   Diagnosis    Musculoskeletal chest pain    Chest pain on breathing    Elevated random blood glucose level    Tobacco abuse    Balance disorder    Gait instability    Leg weakness    Impaired functional mobility and activity tolerance         HISTORY  Past Medical History:   Diagnosis Date    Alcoholic     Head injury, closed     Hyperglycemia        Past Surgical History:   Procedure Laterality Date    EYE SURGERY      KNEE SURGERY      left arm fused  1987    TONSILLECTOMY         Social History     Tobacco Use    Smoking status: Every Day     Types: Cigars     Passive exposure: Past    Smokeless tobacco: Never   Substance Use Topics    Alcohol use: No    Drug use: Not Currently     Comment: alcoholic  clean for 9 years       Family History   Problem Relation Age of Onset    Hypertension Father     Diabetes Father          MEDS:  Current Outpatient Medications on File Prior to Visit   Medication Sig Dispense Refill    atorvastatin (LIPITOR) 10 MG tablet Take 1 tablet (10 mg total) by mouth once daily. 90 tablet 1    ibuprofen (ADVIL,MOTRIN) 800 MG tablet Take 800 mg by mouth 3 (three) times daily. for ten days       Current Facility-Administered Medications on File Prior to Visit   Medication Dose Route Frequency Provider Last Rate Last Admin    albuterol nebulizer solution 2.5 mg  2.5 mg Nebulization 1 time in Clinic/HOD         ipratropium 0.02 %  nebulizer solution 0.5 mg  0.5 mg Nebulization 1 time in Clinic/HOD            ALLERGIES:  Review of patient's allergies indicates:  No Known Allergies      VITALS:  Vitals:    06/28/23 1424   BP: 118/81   Pulse: 102   Temp: 97.8 °F (36.6 °C)         PHYSICAL EXAM:  Physical Exam  Vitals reviewed.   Constitutional:       General: He is not in acute distress.     Appearance: Normal appearance. He is well-developed.   HENT:      Head: Normocephalic and atraumatic.   Eyes:      General: No scleral icterus.  Neck:      Trachea: No tracheal deviation.   Cardiovascular:      Rate and Rhythm: Normal rate and regular rhythm.      Pulses: Normal pulses.   Pulmonary:      Effort: Pulmonary effort is normal. No respiratory distress.      Breath sounds: Normal breath sounds.   Abdominal:      General: There is no distension.      Palpations: Abdomen is soft.      Tenderness: There is no abdominal tenderness.      Hernia: A hernia (reducible periumbilical hernia) is present.   Musculoskeletal:         General: No swelling or tenderness. Normal range of motion.      Cervical back: Normal range of motion and neck supple. No rigidity.   Skin:     General: Skin is warm and dry.      Coloration: Skin is not jaundiced.      Findings: No erythema.   Neurological:      General: No focal deficit present.      Mental Status: He is alert and oriented to person, place, and time. He is not disoriented.      Motor: No weakness or abnormal muscle tone.   Psychiatric:         Mood and Affect: Mood normal.         Behavior: Behavior normal.         Thought Content: Thought content normal.         Judgment: Judgment normal.         LABS:  Lab Results   Component Value Date    WBC 10.40 05/12/2023    RBC 4.99 05/12/2023    HGB 15.4 05/12/2023    HCT 47.9 05/12/2023     05/12/2023     Lab Results   Component Value Date     (H) 05/12/2023     05/12/2023    K 4.3 05/12/2023     05/12/2023    CO2 28 05/12/2023    BUN 13  05/12/2023    CREATININE 0.9 05/12/2023    CALCIUM 9.5 05/12/2023     Lab Results   Component Value Date    ALT 16 05/12/2023    AST 15 05/12/2023    ALKPHOS 112 05/12/2023    BILITOT 0.6 05/12/2023     Lab Results   Component Value Date    MG 2.1 02/26/2014       STUDIES:  Images and reports were personally reviewed.  N/a      ASSESSMENT & PLAN:  53 y.o. male with umbilical hernia  -small reducible umbilical hernia, likely with some component of diastasis recti  -no complications therefore repair to be based off the patient's decision to proceed  -will likely be a good candidate for primary repair with or without mesh given the size of the final defect  -associated risk of pain, bleeding scarring infection, recurrence, seroma, hematoma, mesh infection, etc. were reviewed  -if patient elects to proceed with surgery he will contact the office

## 2023-09-08 ENCOUNTER — TELEPHONE (OUTPATIENT)
Dept: FAMILY MEDICINE | Facility: CLINIC | Age: 53
End: 2023-09-08

## 2023-09-08 DIAGNOSIS — G47.9 SLEEP DISTURBANCE: Primary | ICD-10-CM

## 2023-09-08 DIAGNOSIS — R12 HEARTBURN SYMPTOM: ICD-10-CM

## 2023-09-08 NOTE — TELEPHONE ENCOUNTER
Patient is calling to see if you could please place a referral for a sleep study and also a referral to GI for acid reflux. Patient does not have anyone in mind just whoever you recommend.

## 2023-09-11 ENCOUNTER — PATIENT MESSAGE (OUTPATIENT)
Dept: FAMILY MEDICINE | Facility: CLINIC | Age: 53
End: 2023-09-11

## 2023-09-11 ENCOUNTER — TELEPHONE (OUTPATIENT)
Dept: PULMONOLOGY | Facility: CLINIC | Age: 53
End: 2023-09-11

## 2023-09-28 ENCOUNTER — OFFICE VISIT (OUTPATIENT)
Dept: PULMONOLOGY | Facility: CLINIC | Age: 53
End: 2023-09-28
Payer: MEDICAID

## 2023-09-28 VITALS
DIASTOLIC BLOOD PRESSURE: 90 MMHG | WEIGHT: 223 LBS | HEIGHT: 73 IN | OXYGEN SATURATION: 94 % | HEART RATE: 70 BPM | BODY MASS INDEX: 29.55 KG/M2 | SYSTOLIC BLOOD PRESSURE: 140 MMHG

## 2023-09-28 DIAGNOSIS — G47.9 SLEEP DISTURBANCE: ICD-10-CM

## 2023-09-28 DIAGNOSIS — G47.10 HYPERSOMNOLENCE: Primary | ICD-10-CM

## 2023-09-28 DIAGNOSIS — R41.89 BRAIN FOG: ICD-10-CM

## 2023-09-28 DIAGNOSIS — R06.83 SNORES: ICD-10-CM

## 2023-09-28 DIAGNOSIS — I10 HYPERTENSION, UNSPECIFIED TYPE: ICD-10-CM

## 2023-09-28 PROCEDURE — 3077F SYST BP >= 140 MM HG: CPT | Mod: CPTII,S$GLB,, | Performed by: NURSE PRACTITIONER

## 2023-09-28 PROCEDURE — 3044F PR MOST RECENT HEMOGLOBIN A1C LEVEL <7.0%: ICD-10-PCS | Mod: CPTII,S$GLB,, | Performed by: NURSE PRACTITIONER

## 2023-09-28 PROCEDURE — 99213 PR OFFICE/OUTPT VISIT, EST, LEVL III, 20-29 MIN: ICD-10-PCS | Mod: S$GLB,,, | Performed by: NURSE PRACTITIONER

## 2023-09-28 PROCEDURE — 3008F PR BODY MASS INDEX (BMI) DOCUMENTED: ICD-10-PCS | Mod: CPTII,S$GLB,, | Performed by: NURSE PRACTITIONER

## 2023-09-28 PROCEDURE — 1159F MED LIST DOCD IN RCRD: CPT | Mod: CPTII,S$GLB,, | Performed by: NURSE PRACTITIONER

## 2023-09-28 PROCEDURE — 3080F PR MOST RECENT DIASTOLIC BLOOD PRESSURE >= 90 MM HG: ICD-10-PCS | Mod: CPTII,S$GLB,, | Performed by: NURSE PRACTITIONER

## 2023-09-28 PROCEDURE — 3080F DIAST BP >= 90 MM HG: CPT | Mod: CPTII,S$GLB,, | Performed by: NURSE PRACTITIONER

## 2023-09-28 PROCEDURE — 99213 OFFICE O/P EST LOW 20 MIN: CPT | Mod: S$GLB,,, | Performed by: NURSE PRACTITIONER

## 2023-09-28 PROCEDURE — 1159F PR MEDICATION LIST DOCUMENTED IN MEDICAL RECORD: ICD-10-PCS | Mod: CPTII,S$GLB,, | Performed by: NURSE PRACTITIONER

## 2023-09-28 PROCEDURE — 3008F BODY MASS INDEX DOCD: CPT | Mod: CPTII,S$GLB,, | Performed by: NURSE PRACTITIONER

## 2023-09-28 PROCEDURE — 3044F HG A1C LEVEL LT 7.0%: CPT | Mod: CPTII,S$GLB,, | Performed by: NURSE PRACTITIONER

## 2023-09-28 PROCEDURE — 3077F PR MOST RECENT SYSTOLIC BLOOD PRESSURE >= 140 MM HG: ICD-10-PCS | Mod: CPTII,S$GLB,, | Performed by: NURSE PRACTITIONER

## 2023-09-28 NOTE — PROGRESS NOTES
SUBJECTIVE:    Patient ID: Jaime Mendoza is a 53 y.o. male.    Chief Complaint: Establish Care    HPI  Patient here today to be evaluated for BRINDA.  He does snore and wakes up gasping for air at times.  He suffers with hypersomnolence during the day.  He is chronically fatigued.  He does wake up 3-4 times a night to urinate.    Past Medical History:   Diagnosis Date    Alcoholic     Head injury, closed     Hyperglycemia      Past Surgical History:   Procedure Laterality Date    EYE SURGERY      KNEE SURGERY      left arm fused  1987    TONSILLECTOMY       Family History   Problem Relation Age of Onset    Hypertension Father     Diabetes Father         Social History:   Marital Status:   Occupation: Data Unavailable  Alcohol History:  reports no history of alcohol use.  Tobacco History:  reports that he has been smoking cigars. He started smoking about 8 years ago. He has been exposed to tobacco smoke. He has never used smokeless tobacco.  Drug History:  reports that he does not currently use drugs.    Review of patient's allergies indicates:  No Known Allergies    Current Outpatient Medications   Medication Sig Dispense Refill    atorvastatin (LIPITOR) 10 MG tablet Take 1 tablet (10 mg total) by mouth once daily. 90 tablet 1    ibuprofen (ADVIL,MOTRIN) 800 MG tablet Take 800 mg by mouth 3 (three) times daily. for ten days       Current Facility-Administered Medications   Medication Dose Route Frequency Provider Last Rate Last Admin    albuterol nebulizer solution 2.5 mg  2.5 mg Nebulization 1 time in Clinic/HOD         ipratropium 0.02 % nebulizer solution 0.5 mg  0.5 mg Nebulization 1 time in Clinic/HOD                Review of Systems  General: Feeling Well. Snores, decreased libido, hypersomnolence   Eyes: Vision is good.  ENT:  No sinusitis or pharyngitis.   Heart:: No chest pain or palpitations.  Lungs: No cough, sputum, or wheezing.  GI: No Nausea, vomiting, constipation, diarrhea, or reflux.  :  "frequent night time urination 2-4 times   Musculoskeletal: No joint pain or myalgias.  Skin: No lesions or rashes.  Neuro: brain fog  Lymph: No edema or adenopathy.  Psych: No anxiety or depression.  Endo: No weight change.    OBJECTIVE:      BP (!) 140/90 (BP Location: Right arm, Patient Position: Sitting, BP Method: Medium (Manual))   Pulse 70   Ht 6' 1" (1.854 m)   Wt 101.2 kg (223 lb)   SpO2 (!) 94%   BMI 29.42 kg/m²     Physical Exam  GENERAL: Older patient in no distress.  HEENT: Pupils equal and reactive. Extraocular movements intact. Nose intact.      Pharynx moist. [place] 2   NECK: Supple. 15.5 inches   HEART: Regular rate and rhythm. No murmur or gallop auscultated.  LUNGS: Clear to auscultation and percussion. Lung excursion symmetrical. No change in fremitus. No adventitial noises.  ABDOMEN: Bowel sounds present. Non-tender, no masses palpated.  EXTREMITIES: Normal muscle tone and joint movement, no cyanosis or clubbing.   LYMPHATICS: No adenopathy palpated, no edema.  SKIN: Dry, intact, no lesions.   NEURO: Cranial nerves II-XII intact. Motor strength 5/5 bilaterally, upper and lower extremities.  PSYCH: Appropriate affect.    Assessment:       1. Hypersomnolence    2. Sleep disturbance    3. Brain fog    4. Hypertension, unspecified type        Nelson is 6  Plan:          Split night sleep study     Follow up in about 3 months (around 12/28/2023).          "

## 2023-09-29 ENCOUNTER — TELEPHONE (OUTPATIENT)
Dept: PULMONOLOGY | Facility: CLINIC | Age: 53
End: 2023-09-29

## 2023-09-29 DIAGNOSIS — G47.10 HYPERSOMNOLENCE: Primary | ICD-10-CM

## 2023-10-03 NOTE — TELEPHONE ENCOUNTER
Left message informing him his insurance denied the in lab sleep study, and Karime has ordered a home study.  [facility] scheduling will call you to schedule.  Any questions please call [phone].

## 2023-10-25 ENCOUNTER — PROCEDURE VISIT (OUTPATIENT)
Dept: SLEEP MEDICINE | Facility: HOSPITAL | Age: 53
End: 2023-10-25
Attending: NURSE PRACTITIONER
Payer: MEDICAID

## 2023-10-25 DIAGNOSIS — G47.10 HYPERSOMNOLENCE: ICD-10-CM

## 2023-10-25 PROCEDURE — 95806 SLEEP STUDY UNATT&RESP EFFT: CPT

## 2023-10-30 ENCOUNTER — TELEPHONE (OUTPATIENT)
Dept: PULMONOLOGY | Facility: CLINIC | Age: 53
End: 2023-10-30

## 2023-10-30 ENCOUNTER — PATIENT MESSAGE (OUTPATIENT)
Dept: PULMONOLOGY | Facility: CLINIC | Age: 53
End: 2023-10-30

## 2023-10-30 DIAGNOSIS — G47.33 OSA (OBSTRUCTIVE SLEEP APNEA): Primary | ICD-10-CM

## 2023-11-13 ENCOUNTER — TELEPHONE (OUTPATIENT)
Dept: PULMONOLOGY | Facility: CLINIC | Age: 53
End: 2023-11-13

## 2023-11-13 NOTE — TELEPHONE ENCOUNTER
----- Message from Erlinda Baez sent at 11/13/2023  9:42 AM CST -----  Regarding: Please advise  The patient called stating he had his Sleep Study done 10/25/23, and would like his results. Please advise if REBECA Lovelace would like to see the patient in the office or if she will call him with results. Thanks in advance for your

## 2023-11-20 ENCOUNTER — OFFICE VISIT (OUTPATIENT)
Dept: FAMILY MEDICINE | Facility: CLINIC | Age: 53
End: 2023-11-20
Payer: MEDICAID

## 2023-11-20 VITALS — HEART RATE: 82 BPM | HEIGHT: 73 IN | BODY MASS INDEX: 28.76 KG/M2 | WEIGHT: 217 LBS | OXYGEN SATURATION: 96 %

## 2023-11-20 DIAGNOSIS — Z12.5 SCREENING PSA (PROSTATE SPECIFIC ANTIGEN): ICD-10-CM

## 2023-11-20 DIAGNOSIS — E78.2 MIXED HYPERLIPIDEMIA: ICD-10-CM

## 2023-11-20 DIAGNOSIS — R73.03 PREDIABETES: Primary | ICD-10-CM

## 2023-11-20 DIAGNOSIS — K21.9 GASTROESOPHAGEAL REFLUX DISEASE, UNSPECIFIED WHETHER ESOPHAGITIS PRESENT: ICD-10-CM

## 2023-11-20 PROCEDURE — 1159F MED LIST DOCD IN RCRD: CPT | Mod: CPTII,S$GLB,, | Performed by: NURSE PRACTITIONER

## 2023-11-20 PROCEDURE — 1159F PR MEDICATION LIST DOCUMENTED IN MEDICAL RECORD: ICD-10-PCS | Mod: CPTII,S$GLB,, | Performed by: NURSE PRACTITIONER

## 2023-11-20 PROCEDURE — 3044F PR MOST RECENT HEMOGLOBIN A1C LEVEL <7.0%: ICD-10-PCS | Mod: CPTII,S$GLB,, | Performed by: NURSE PRACTITIONER

## 2023-11-20 PROCEDURE — 3008F BODY MASS INDEX DOCD: CPT | Mod: CPTII,S$GLB,, | Performed by: NURSE PRACTITIONER

## 2023-11-20 PROCEDURE — 99214 OFFICE O/P EST MOD 30 MIN: CPT | Mod: S$GLB,,, | Performed by: NURSE PRACTITIONER

## 2023-11-20 PROCEDURE — 99214 PR OFFICE/OUTPT VISIT, EST, LEVL IV, 30-39 MIN: ICD-10-PCS | Mod: S$GLB,,, | Performed by: NURSE PRACTITIONER

## 2023-11-20 PROCEDURE — 1160F RVW MEDS BY RX/DR IN RCRD: CPT | Mod: CPTII,S$GLB,, | Performed by: NURSE PRACTITIONER

## 2023-11-20 PROCEDURE — 3044F HG A1C LEVEL LT 7.0%: CPT | Mod: CPTII,S$GLB,, | Performed by: NURSE PRACTITIONER

## 2023-11-20 PROCEDURE — 1160F PR REVIEW ALL MEDS BY PRESCRIBER/CLIN PHARMACIST DOCUMENTED: ICD-10-PCS | Mod: CPTII,S$GLB,, | Performed by: NURSE PRACTITIONER

## 2023-11-20 PROCEDURE — 3008F PR BODY MASS INDEX (BMI) DOCUMENTED: ICD-10-PCS | Mod: CPTII,S$GLB,, | Performed by: NURSE PRACTITIONER

## 2023-11-20 RX ORDER — ATORVASTATIN CALCIUM 10 MG/1
10 TABLET, FILM COATED ORAL DAILY
Qty: 90 TABLET | Refills: 1 | Status: SHIPPED | OUTPATIENT
Start: 2023-11-20 | End: 2024-02-02 | Stop reason: SDUPTHER

## 2023-11-20 RX ORDER — CLOBETASOL PROPIONATE 0.5 MG/G
CREAM TOPICAL 2 TIMES DAILY
COMMUNITY
Start: 2023-10-25

## 2023-11-20 RX ORDER — OMEPRAZOLE 40 MG/1
1 CAPSULE, DELAYED RELEASE ORAL EVERY MORNING
COMMUNITY
Start: 2023-11-16

## 2023-11-20 RX ORDER — PIMECROLIMUS 10 MG/G
1 CREAM TOPICAL 2 TIMES DAILY
COMMUNITY
Start: 2023-10-25

## 2023-11-20 NOTE — PROGRESS NOTES
SUBJECTIVE:      Patient ID: Jaime Mendoza is a 53 y.o. male.    Chief Complaint: Diabetes    Mr Mendoza is here today to f/u on prediabetes and hyperlipidemia. He is doing well. He did not have labs, says he is vacated out of his house due to mold and has been busy.     Hyperlipidemia  This is a chronic problem. The current episode started more than 1 year ago. The problem is controlled. Recent lipid tests were reviewed and are normal. Exacerbating diseases include diabetes. Pertinent negatives include no chest pain or shortness of breath. Current antihyperlipidemic treatment includes diet change, exercise and statins. The current treatment provides significant improvement of lipids.   Diabetes  He presents for his follow-up (prediabetes) diabetic visit. His disease course has been stable. There are no hypoglycemic associated symptoms. Pertinent negatives for hypoglycemia include no confusion, dizziness, headaches, nervousness/anxiousness, pallor, seizures or speech difficulty. There are no diabetic associated symptoms. Pertinent negatives for diabetes include no chest pain, no polydipsia, no polyphagia, no polyuria and no weakness. There are no hypoglycemic complications. Symptoms are stable. There are no diabetic complications. Risk factors for coronary artery disease include diabetes mellitus, male sex and dyslipidemia. Current diabetic treatment includes diet. He is following a generally healthy diet. When asked about meal planning, he reported none.       Past Surgical History:   Procedure Laterality Date    EYE SURGERY      KNEE SURGERY      left arm fused  1987    SKIN CANCER EXCISION  09/2023    TONSILLECTOMY       Family History   Problem Relation Age of Onset    Hypertension Father     Diabetes Father       Social History     Socioeconomic History    Marital status:    Tobacco Use    Smoking status: Every Day     Types: Cigars     Start date: 2015     Passive exposure: Past    Smokeless tobacco:  Never   Substance and Sexual Activity    Alcohol use: No    Drug use: Not Currently     Comment: alcoholic  clean for 9 years    Sexual activity: Yes     Partners: Female     Social Determinants of Health     Financial Resource Strain: Low Risk  (5/17/2023)    Overall Financial Resource Strain (CARDIA)     Difficulty of Paying Living Expenses: Not very hard   Food Insecurity: No Food Insecurity (5/17/2023)    Hunger Vital Sign     Worried About Running Out of Food in the Last Year: Never true     Ran Out of Food in the Last Year: Never true   Transportation Needs: No Transportation Needs (5/17/2023)    PRAPARE - Transportation     Lack of Transportation (Medical): No     Lack of Transportation (Non-Medical): No   Physical Activity: Inactive (5/17/2023)    Exercise Vital Sign     Days of Exercise per Week: 0 days     Minutes of Exercise per Session: 0 min   Stress: No Stress Concern Present (5/17/2023)    [place] [employer] - Occupational Stress Questionnaire     Feeling of Stress : Not at all   Social Connections: Unknown (5/17/2023)    Social Connection and Isolation Panel [NHANES]     Frequency of Communication with Friends and Family: More than three times a week     Frequency of Social Gatherings with Friends and Family: More than three times a week     Active Member of Clubs or Organizations: Yes     Attends Club or Organization Meetings: More than 4 times per year     Marital Status:    Housing Stability: Low Risk  (10/3/2022)    Housing Stability Vital Sign     Unable to Pay for Housing in the Last Year: No     Number of Places Lived in the Last Year: 1     Unstable Housing in the Last Year: No     Current Outpatient Medications   Medication Sig Dispense Refill    clobetasoL (TEMOVATE) 0.05 % cream 2 (two) times daily.      ELIDEL 1 % cream 1 application  2 (two) times daily. Apply to affected area      omeprazole (PRILOSEC) 40 MG capsule Take 1 capsule by mouth every morning.    "   atorvastatin (LIPITOR) 10 MG tablet Take 1 tablet (10 mg total) by mouth once daily. 90 tablet 1     Current Facility-Administered Medications   Medication Dose Route Frequency Provider Last Rate Last Admin    albuterol nebulizer solution 2.5 mg  2.5 mg Nebulization 1 time in Clinic/HOD         ipratropium 0.02 % nebulizer solution 0.5 mg  0.5 mg Nebulization 1 time in Clinic/HOD          Review of patient's allergies indicates:  No Known Allergies   Past Medical History:   Diagnosis Date    Alcoholic     Head injury, closed     Hyperglycemia      Past Surgical History:   Procedure Laterality Date    EYE SURGERY      KNEE SURGERY      left arm fused  1987    SKIN CANCER EXCISION  09/2023    TONSILLECTOMY         Review of Systems   Constitutional:  Negative for appetite change, chills, diaphoresis and unexpected weight change.   HENT:  Negative for ear discharge, facial swelling, hearing loss, nosebleeds and trouble swallowing.    Eyes:  Negative for photophobia, pain and visual disturbance.   Respiratory:  Negative for apnea, choking, shortness of breath and wheezing.    Cardiovascular:  Negative for chest pain and palpitations.   Gastrointestinal:  Negative for abdominal pain, blood in stool and vomiting.   Endocrine: Negative for polydipsia, polyphagia and polyuria.   Genitourinary:  Negative for difficulty urinating and hematuria.   Musculoskeletal:  Negative for gait problem and joint swelling.   Skin:  Negative for pallor.   Neurological:  Negative for dizziness, seizures, speech difficulty, weakness and headaches.   Hematological:  Does not bruise/bleed easily.   Psychiatric/Behavioral:  Negative for agitation, confusion, dysphoric mood, self-injury, sleep disturbance and suicidal ideas. The patient is not nervous/anxious.       OBJECTIVE:      Vitals:    11/20/23 0806   BP: (P) 134/82   Pulse: 82   SpO2: 96%   Weight: 98.4 kg (217 lb)   Height: 6' 1" (1.854 m)     Physical Exam  Vitals and nursing note " reviewed.   Constitutional:       General: He is not in acute distress.     Appearance: He is well-developed.   HENT:      Head: Normocephalic and atraumatic.      Nose: Nose normal.      Mouth/Throat:      Pharynx: Uvula midline.   Eyes:      General: Lids are normal.      Conjunctiva/sclera: Conjunctivae normal.      Pupils: Pupils are equal, round, and reactive to light.      Right eye: Pupil is round and reactive.      Left eye: Pupil is round and reactive.   Neck:      Thyroid: No thyromegaly.      Vascular: No carotid bruit.   Cardiovascular:      Rate and Rhythm: Normal rate and regular rhythm.      Pulses: Normal pulses.   Pulmonary:      Effort: Pulmonary effort is normal.      Breath sounds: Normal breath sounds. No wheezing, rhonchi or rales.   Abdominal:      General: Bowel sounds are normal.      Palpations: Abdomen is soft. Abdomen is not rigid.      Tenderness: There is no abdominal tenderness.   Musculoskeletal:         General: Normal range of motion.      Cervical back: Normal range of motion and neck supple.      Right lower leg: No edema.      Left lower leg: No edema.   Lymphadenopathy:      Cervical: No cervical adenopathy.   Skin:     General: Skin is warm and dry.      Nails: There is no clubbing.   Neurological:      Mental Status: He is alert and oriented to person, place, and time.   Psychiatric:         Mood and Affect: Mood normal.         Speech: Speech normal.         Behavior: Behavior normal. Behavior is cooperative.         Thought Content: Thought content normal.         Judgment: Judgment normal.        Last visit note, most recent available labs, and health maintenance reviewed    Assessment:       1. Prediabetes    2. Mixed hyperlipidemia    3. Screening PSA (prostate specific antigen)    4. Gastroesophageal reflux disease, unspecified whether esophagitis present        Plan:       Prediabetes  -     Comprehensive Metabolic Panel; Future; Expected date: 12/04/2023  -     Lipid  Panel; Future; Expected date: 12/04/2023  -     TSH; Future; Expected date: 01/01/2024  -     Urinalysis; Future; Expected date: 12/04/2023  -     Hemoglobin A1C; Future; Expected date: 12/04/2023  Cont lipitor    Mixed hyperlipidemia  -     Comprehensive Metabolic Panel; Future; Expected date: 12/04/2023  -     Lipid Panel; Future; Expected date: 12/04/2023  -     TSH; Future; Expected date: 01/01/2024  -     Urinalysis; Future; Expected date: 12/04/2023  -     atorvastatin (LIPITOR) 10 MG tablet; Take 1 tablet (10 mg total) by mouth once daily.  Dispense: 90 tablet; Refill: 1  Cont lipitor    Screening PSA (prostate specific antigen)  -     PSA, Screening; Future; Expected date: 12/04/2023    Gastroesophageal reflux disease, unspecified whether esophagitis present  Cont f/u with gastro  Cont prilosec        Follow up in about 6 months (around 5/20/2024) for pre dm .      11/20/2023 CARMEN Jeronimo, FNP

## 2023-12-30 ENCOUNTER — LAB VISIT (OUTPATIENT)
Dept: LAB | Facility: HOSPITAL | Age: 53
End: 2023-12-30
Attending: NURSE PRACTITIONER
Payer: MEDICAID

## 2023-12-30 DIAGNOSIS — E78.2 MIXED HYPERLIPIDEMIA: ICD-10-CM

## 2023-12-30 DIAGNOSIS — R73.03 PREDIABETES: ICD-10-CM

## 2023-12-30 DIAGNOSIS — Z12.5 SCREENING PSA (PROSTATE SPECIFIC ANTIGEN): ICD-10-CM

## 2023-12-30 LAB
ALBUMIN SERPL BCP-MCNC: 4 G/DL (ref 3.5–5.2)
ALP SERPL-CCNC: 128 U/L (ref 55–135)
ALT SERPL W/O P-5'-P-CCNC: 14 U/L (ref 10–44)
ANION GAP SERPL CALC-SCNC: 10 MMOL/L (ref 8–16)
AST SERPL-CCNC: 16 U/L (ref 10–40)
BILIRUB SERPL-MCNC: 0.5 MG/DL (ref 0.1–1)
BUN SERPL-MCNC: 21 MG/DL (ref 6–20)
CALCIUM SERPL-MCNC: 9.3 MG/DL (ref 8.7–10.5)
CHLORIDE SERPL-SCNC: 103 MMOL/L (ref 95–110)
CHOLEST SERPL-MCNC: 140 MG/DL (ref 120–199)
CHOLEST/HDLC SERPL: 3.6 {RATIO} (ref 2–5)
CO2 SERPL-SCNC: 23 MMOL/L (ref 23–29)
COMPLEXED PSA SERPL-MCNC: 0.94 NG/ML (ref 0–4)
CREAT SERPL-MCNC: 1 MG/DL (ref 0.5–1.4)
EST. GFR  (NO RACE VARIABLE): >60 ML/MIN/1.73 M^2
ESTIMATED AVG GLUCOSE: 126 MG/DL (ref 68–131)
GLUCOSE SERPL-MCNC: 114 MG/DL (ref 70–110)
HBA1C MFR BLD: 6 % (ref 4–5.6)
HDLC SERPL-MCNC: 39 MG/DL (ref 40–75)
HDLC SERPL: 27.9 % (ref 20–50)
LDLC SERPL CALC-MCNC: 79.4 MG/DL (ref 63–159)
NONHDLC SERPL-MCNC: 101 MG/DL
POTASSIUM SERPL-SCNC: 4.2 MMOL/L (ref 3.5–5.1)
PROT SERPL-MCNC: 6.8 G/DL (ref 6–8.4)
SODIUM SERPL-SCNC: 136 MMOL/L (ref 136–145)
TRIGL SERPL-MCNC: 108 MG/DL (ref 30–150)
TSH SERPL DL<=0.005 MIU/L-ACNC: 1.45 UIU/ML (ref 0.4–4)

## 2023-12-30 PROCEDURE — 80053 COMPREHEN METABOLIC PANEL: CPT | Performed by: NURSE PRACTITIONER

## 2023-12-30 PROCEDURE — 84443 ASSAY THYROID STIM HORMONE: CPT | Performed by: NURSE PRACTITIONER

## 2023-12-30 PROCEDURE — 80061 LIPID PANEL: CPT | Performed by: NURSE PRACTITIONER

## 2023-12-30 PROCEDURE — 36415 COLL VENOUS BLD VENIPUNCTURE: CPT | Mod: PO | Performed by: NURSE PRACTITIONER

## 2023-12-30 PROCEDURE — 84153 ASSAY OF PSA TOTAL: CPT | Performed by: NURSE PRACTITIONER

## 2023-12-30 PROCEDURE — 83036 HEMOGLOBIN GLYCOSYLATED A1C: CPT | Performed by: NURSE PRACTITIONER

## 2024-01-02 ENCOUNTER — TELEPHONE (OUTPATIENT)
Dept: FAMILY MEDICINE | Facility: CLINIC | Age: 54
End: 2024-01-02
Payer: MEDICAID

## 2024-01-02 DIAGNOSIS — R31.21 ASYMPTOMATIC MICROSCOPIC HEMATURIA: Primary | ICD-10-CM

## 2024-01-02 NOTE — TELEPHONE ENCOUNTER
----- Message from Pedro Watt NP sent at 1/2/2024  7:42 AM CST -----  Microscopic hematuria noted, any urinary symptoms? I would like a repeat u/a with reflex to culture in 2 weeks

## 2024-01-31 ENCOUNTER — TELEPHONE (OUTPATIENT)
Dept: FAMILY MEDICINE | Facility: CLINIC | Age: 54
End: 2024-01-31
Payer: MEDICAID

## 2024-01-31 DIAGNOSIS — S42.002D CLOSED NONDISPLACED FRACTURE OF LEFT CLAVICLE WITH ROUTINE HEALING, UNSPECIFIED PART OF CLAVICLE, SUBSEQUENT ENCOUNTER: Primary | ICD-10-CM

## 2024-02-02 ENCOUNTER — OFFICE VISIT (OUTPATIENT)
Dept: ORTHOPEDICS | Facility: CLINIC | Age: 54
End: 2024-02-02
Payer: MEDICAID

## 2024-02-02 VITALS — BODY MASS INDEX: 28.76 KG/M2 | WEIGHT: 217 LBS | HEIGHT: 73 IN

## 2024-02-02 DIAGNOSIS — E78.2 MIXED HYPERLIPIDEMIA: ICD-10-CM

## 2024-02-02 DIAGNOSIS — M25.561 CHRONIC PAIN OF RIGHT KNEE: Primary | ICD-10-CM

## 2024-02-02 DIAGNOSIS — G89.29 CHRONIC PAIN OF RIGHT KNEE: Primary | ICD-10-CM

## 2024-02-02 DIAGNOSIS — S42.002D CLOSED NONDISPLACED FRACTURE OF LEFT CLAVICLE WITH ROUTINE HEALING, UNSPECIFIED PART OF CLAVICLE, SUBSEQUENT ENCOUNTER: Primary | ICD-10-CM

## 2024-02-02 PROCEDURE — 1160F RVW MEDS BY RX/DR IN RCRD: CPT | Mod: CPTII,S$GLB,, | Performed by: PHYSICIAN ASSISTANT

## 2024-02-02 PROCEDURE — 3008F BODY MASS INDEX DOCD: CPT | Mod: CPTII,S$GLB,, | Performed by: PHYSICIAN ASSISTANT

## 2024-02-02 PROCEDURE — 99213 OFFICE O/P EST LOW 20 MIN: CPT | Mod: 57,S$GLB,, | Performed by: PHYSICIAN ASSISTANT

## 2024-02-02 PROCEDURE — 1159F MED LIST DOCD IN RCRD: CPT | Mod: CPTII,S$GLB,, | Performed by: PHYSICIAN ASSISTANT

## 2024-02-02 PROCEDURE — 23500 CLTX CLAVICULAR FX W/O MNPJ: CPT | Mod: LT,S$GLB,, | Performed by: PHYSICIAN ASSISTANT

## 2024-02-02 RX ORDER — ATORVASTATIN CALCIUM 10 MG/1
10 TABLET, FILM COATED ORAL DAILY
Qty: 90 TABLET | Refills: 1 | Status: SHIPPED | OUTPATIENT
Start: 2024-02-02 | End: 2024-05-23 | Stop reason: SDUPTHER

## 2024-02-02 RX ORDER — ACETAMINOPHEN AND CODEINE PHOSPHATE 300; 30 MG/1; MG/1
1 TABLET ORAL
COMMUNITY
Start: 2024-02-01

## 2024-02-02 RX ORDER — DUPILUMAB 300 MG/2ML
INJECTION, SOLUTION SUBCUTANEOUS
COMMUNITY

## 2024-02-02 NOTE — TELEPHONE ENCOUNTER
----- Message from Lary Montemayor sent at 2/2/2024 10:22 AM CST -----  Regarding: refill and referral  Pt needs refill on his astorvastatin 10 mg [facility] [address]     Also needs referral for Dr Mari sheehan   thanks  [phone] pts # [phone]

## 2024-02-02 NOTE — PROGRESS NOTES
[facility]  [address]  [place] [address]  Phone: [phone]   [phone]    Patient's PCP: Pedro Watt, NP  Referring Provider: Pedro Watt    Subjective:      Chief Complaint:   Chief Complaint   Patient presents with    Clavicle Injury     Closed nondisplaced fracture of left clavicle Notes shoulder pain with bruising through chest after fall one week ago States pain is 3/10 after pain medication. Using sling to help immobilize shoulder        Past Medical History:   Diagnosis Date    Alcoholic     Head injury, closed     Hyperglycemia        Past Surgical History:   Procedure Laterality Date    EYE SURGERY      KNEE SURGERY      left arm fused  1987    SKIN CANCER EXCISION  09/2023    TONSILLECTOMY         Current Outpatient Medications   Medication Sig    acetaminophen-codeine 300-30mg (TYLENOL #3) 300-30 mg Tab Take 1 tablet by mouth.    atorvastatin (LIPITOR) 10 MG tablet Take 1 tablet (10 mg total) by mouth once daily.    clobetasoL (TEMOVATE) 0.05 % cream 2 (two) times daily.    DUPIXENT SYRINGE 300 mg/2 mL Syrg INJECT 300MG UNDER THE SKIN EVERY 2 WEEKS AS DIRECTED. Subcutaneous for 28 Days    ELIDEL 1 % cream 1 application  2 (two) times daily. Apply to affected area    omeprazole (PRILOSEC) 40 MG capsule Take 1 capsule by mouth every morning.     Current Facility-Administered Medications   Medication    albuterol nebulizer solution 2.5 mg    ipratropium 0.02 % nebulizer solution 0.5 mg       Review of patient's allergies indicates:  No Known Allergies    Family History   Problem Relation Age of Onset    Hypertension Father     Diabetes Father        Social History     Socioeconomic History    Marital status:    Tobacco Use    Smoking status: Every Day     Types: Cigars     Start date: 2015     Passive exposure: Past    Smokeless tobacco: Never   Substance and Sexual Activity    Alcohol use: No    Drug use: Not Currently     Comment: alcoholic  clean for 9  years    Sexual activity: Yes     Partners: Female     Social Determinants of Health     Financial Resource Strain: Low Risk  (5/17/2023)    Overall Financial Resource Strain (CARDIA)     Difficulty of Paying Living Expenses: Not very hard   Food Insecurity: No Food Insecurity (5/17/2023)    Hunger Vital Sign     Worried About Running Out of Food in the Last Year: Never true     Ran Out of Food in the Last Year: Never true   Transportation Needs: No Transportation Needs (5/17/2023)    PRAPARE - Transportation     Lack of Transportation (Medical): No     Lack of Transportation (Non-Medical): No   Physical Activity: Inactive (5/17/2023)    Exercise Vital Sign     Days of Exercise per Week: 0 days     Minutes of Exercise per Session: 0 min   Stress: No Stress Concern Present (5/17/2023)    Citizen of the [place] of Occupational Health - Occupational Stress Questionnaire     Feeling of Stress : Not at all   Social Connections: Unknown (5/17/2023)    Social Connection and Isolation Panel [NHANES]     Frequency of Communication with Friends and Family: More than three times a week     Frequency of Social Gatherings with Friends and Family: More than three times a week     Active Member of Clubs or Organizations: Yes     Attends Club or Organization Meetings: More than 4 times per year     Marital Status:    Housing Stability: Low Risk  (10/3/2022)    Housing Stability Vital Sign     Unable to Pay for Housing in the Last Year: No     Number of Places Lived in the Last Year: 1     Unstable Housing in the Last Year: No       Prior to meeting with the patient I reviewed the medical chart in [facility]. This included reviewing the previous progress notes from our office, review of the patient's last appointment with their primary care provider, review of any visits to the emergency room, and review of any pain management appointments or procedures.    History of present illness:  54-year-old male, presents to clinic today for  evaluation of complaints of left shoulder pain.     Had a fall on Friday January 26.  Had some pain ended up going to the urgent care and was diagnosed with a left distal clavicle fracture.  Was referred to Orthopedics by his primary care provider.    Review of Systems:    Constitutional: Negative for chills, fever and weight loss.   HENT: Negative for congestion.    Eyes: Negative for discharge and redness.   Respiratory: Negative for cough and shortness of breath.    Cardiovascular: Negative for chest pain.   Gastrointestinal: Negative for nausea and vomiting.   Musculoskeletal: See HPI.   Skin: Negative for rash.   Neurological: Negative for headaches.   Endo/Heme/Allergies: Does not bruise/bleed easily.   Psychiatric/Behavioral: The patient is not nervous/anxious.    All other systems reviewed and are negative.       Objective:      Physical Examination:    Vital Signs:  There were no vitals filed for this visit.    Body mass index is 28.63 kg/m².    This a well-developed, well nourished patient in no acute distress.  They are alert and oriented and cooperative to examination.     Examination of the left shoulder, the patient has some ecchymosis over the distal clavicle company he has a prior history of a left elbow fusion after trauma many years ago.  He has no flexion or extension.  He is fused in a 90 degree position.  He does have good pronation and supination without pain.  Good range of motion in the wrist hand and fingers.      Pertinent New Results:        XRAY Report / Interpretation:   AP and angled views of the left clavicle taken today in the office demonstrate and mildly comminuted distal clavicle fracture.  Does not appear to be any significant disruption of the coracoid ligament, no elevation of the fracture fragment.          Assessment:       1. Closed nondisplaced fracture of left clavicle with routine healing, unspecified part of clavicle, subsequent encounter      Plan:     Closed  nondisplaced fracture of left clavicle with routine healing, unspecified part of clavicle, subsequent encounter  -     X-Ray Clavicle Left  -     Ambulatory referral/consult to Orthopedics        Follow up for FRACTURE CARE, //XR// 1 month f/u - Left clavicle fracture.    Fall, left shoulder pain, history of elbow fusion.  Patient found to have a distal clavicle fracture today on x-ray.  Corresponds with history from urgent care.  They had him in his sling.  I discontinued this today.  He can use it as needed for pain but encouraged him if he has painless range of motion in the left shoulder I would rather him continue to move the shoulder especially since he has such limited range of motion in the elbow.  We will check him back in a month for repeat x-rays.  They gave him Tylenol 3 at the urgent care which he is taken 1 of in the last week.  If he needs any additional pain medicine he will contact the office.      TRELL Mccray, PAJadeC    This note was created using [employer] voice recognition software that occasionally misinterprets words or phrases.

## 2024-02-19 ENCOUNTER — TELEPHONE (OUTPATIENT)
Dept: FAMILY MEDICINE | Facility: CLINIC | Age: 54
End: 2024-02-19
Payer: MEDICAID

## 2024-02-19 NOTE — TELEPHONE ENCOUNTER
----- Message from Mariia Malik MA sent at 2/19/2024  2:28 PM CST -----  Patient states he was referred to GI but they are located in [place].  Patient would like to see someone here in [place]    Pt: [phone]  -DN

## 2024-02-29 ENCOUNTER — OFFICE VISIT (OUTPATIENT)
Dept: ORTHOPEDICS | Facility: CLINIC | Age: 54
End: 2024-02-29
Payer: MEDICAID

## 2024-02-29 VITALS — WEIGHT: 217 LBS | HEIGHT: 73 IN | BODY MASS INDEX: 28.76 KG/M2

## 2024-02-29 DIAGNOSIS — M17.11 PRIMARY OSTEOARTHRITIS OF RIGHT KNEE: ICD-10-CM

## 2024-02-29 DIAGNOSIS — S42.002D CLOSED NONDISPLACED FRACTURE OF LEFT CLAVICLE WITH ROUTINE HEALING, UNSPECIFIED PART OF CLAVICLE, SUBSEQUENT ENCOUNTER: Primary | ICD-10-CM

## 2024-02-29 PROCEDURE — 99213 OFFICE O/P EST LOW 20 MIN: CPT | Mod: 25,24,S$GLB, | Performed by: ORTHOPAEDIC SURGERY

## 2024-02-29 PROCEDURE — 1159F MED LIST DOCD IN RCRD: CPT | Mod: CPTII,S$GLB,, | Performed by: ORTHOPAEDIC SURGERY

## 2024-02-29 PROCEDURE — 1160F RVW MEDS BY RX/DR IN RCRD: CPT | Mod: CPTII,S$GLB,, | Performed by: ORTHOPAEDIC SURGERY

## 2024-02-29 PROCEDURE — 3008F BODY MASS INDEX DOCD: CPT | Mod: CPTII,S$GLB,, | Performed by: ORTHOPAEDIC SURGERY

## 2024-02-29 PROCEDURE — 99024 POSTOP FOLLOW-UP VISIT: CPT | Mod: S$GLB,,, | Performed by: ORTHOPAEDIC SURGERY

## 2024-02-29 PROCEDURE — 20610 DRAIN/INJ JOINT/BURSA W/O US: CPT | Mod: RT,S$GLB,, | Performed by: ORTHOPAEDIC SURGERY

## 2024-02-29 RX ORDER — TRIAMCINOLONE ACETONIDE 40 MG/ML
40 INJECTION, SUSPENSION INTRA-ARTICULAR; INTRAMUSCULAR
Status: DISCONTINUED | OUTPATIENT
Start: 2024-02-29 | End: 2024-02-29 | Stop reason: HOSPADM

## 2024-02-29 RX ORDER — BISACODYL 5 MG
TABLET, DELAYED RELEASE (ENTERIC COATED) ORAL
COMMUNITY
Start: 2024-02-19 | End: 2024-05-23

## 2024-02-29 RX ADMIN — TRIAMCINOLONE ACETONIDE 40 MG: 40 INJECTION, SUSPENSION INTRA-ARTICULAR; INTRAMUSCULAR at 01:02

## 2024-02-29 NOTE — PROCEDURES
Large Joint Aspiration/Injection: R knee    Date/Time: 2/29/2024 1:15 PM    Performed by: Nile Guerra MD  Authorized by: Nile Guerra MD    Consent Done?:  Yes (Verbal)  Indications:  Pain  Site marked: the procedure site was marked    Timeout: prior to procedure the correct patient, procedure, and site was verified    Prep: patient was prepped and draped in usual sterile fashion      Local anesthesia used?: Yes    Local anesthetic:  Lidocaine 1% without epinephrine    Details:  Needle Size:  25 G  Ultrasonic Guidance for needle placement?: No    Approach:  Anterolateral  Location:  Knee  Site:  R knee  Medications:  40 mg triamcinolone acetonide 40 mg/mL  Patient tolerance:  Patient tolerated the procedure well with no immediate complications

## 2024-02-29 NOTE — PROGRESS NOTES
[facility]    Subjective:     Chief Complaint:   Chief Complaint   Patient presents with    Right Knee - Pain    Clavicle Injury     Patient is here for a F/up left clavicle fracture states pain is better than the last visit but there is still pain  states right knee pain x 3-4 months occasionally gives away       Past Medical History:   Diagnosis Date    Alcoholic     Head injury, closed     Hyperglycemia        Past Surgical History:   Procedure Laterality Date    EYE SURGERY      KNEE SURGERY      left arm fused  1987    SKIN CANCER EXCISION  09/2023    TONSILLECTOMY         Current Outpatient Medications   Medication Sig    acetaminophen-codeine 300-30mg (TYLENOL #3) 300-30 mg Tab Take 1 tablet by mouth.    atorvastatin (LIPITOR) 10 MG tablet Take 1 tablet (10 mg total) by mouth once daily.    bisacodyL (DULCOLAX) 5 mg EC tablet Take as directed with colonoscopy prep.    DUPIXENT SYRINGE 300 mg/2 mL Syrg INJECT 300MG UNDER THE SKIN EVERY 2 WEEKS AS DIRECTED. Subcutaneous for 28 Days    omeprazole (PRILOSEC) 40 MG capsule Take 1 capsule by mouth every morning.    clobetasoL (TEMOVATE) 0.05 % cream 2 (two) times daily.    ELIDEL 1 % cream 1 application  2 (two) times daily. Apply to affected area     Current Facility-Administered Medications   Medication    albuterol nebulizer solution 2.5 mg    ipratropium 0.02 % nebulizer solution 0.5 mg       Review of patient's allergies indicates:  No Known Allergies    Family History   Problem Relation Age of Onset    Hypertension Father     Diabetes Father        Social History     Socioeconomic History    Marital status:    Tobacco Use    Smoking status: Every Day     Types: Cigars     Start date: 2015     Passive exposure: Past    Smokeless tobacco: Never   Substance and Sexual Activity    Alcohol use: No    Drug use: Not Currently     Comment: alcoholic  clean for 9 years    Sexual activity: Yes     Partners: Female     Social Determinants of Health      Financial Resource Strain: Low Risk  (5/17/2023)    Overall Financial Resource Strain (CARDIA)     Difficulty of Paying Living Expenses: Not very hard   Food Insecurity: No Food Insecurity (5/17/2023)    Hunger Vital Sign     Worried About Running Out of Food in the Last Year: Never true     Ran Out of Food in the Last Year: Never true   Transportation Needs: No Transportation Needs (5/17/2023)    PRAPARE - Transportation     Lack of Transportation (Medical): No     Lack of Transportation (Non-Medical): No   Physical Activity: Inactive (5/17/2023)    Exercise Vital Sign     Days of Exercise per Week: 0 days     Minutes of Exercise per Session: 0 min   Stress: No Stress Concern Present (5/17/2023)    [employer] - Occupational Stress Questionnaire     Feeling of Stress : Not at all   Social Connections: Unknown (5/17/2023)    Social Connection and Isolation Panel [NHANES]     Frequency of Communication with Friends and Family: More than three times a week     Frequency of Social Gatherings with Friends and Family: More than three times a week     Active Member of Clubs or Organizations: Yes     Attends Club or Organization Meetings: More than 4 times per year     Marital Status:    Housing Stability: Low Risk  (10/3/2022)    Housing Stability Vital Sign     Unable to Pay for Housing in the Last Year: No     Number of Places Lived in the Last Year: 1     Unstable Housing in the Last Year: No       History of present illness: 54-year-old male, presents to clinic today for evaluation of complaints of left shoulder pain.  Saw him for initial evaluation on February 2nd.     Had a fall on Friday January 26.  Had some pain ended up going to the urgent care and was diagnosed with a left distal clavicle fracture.  Was referred to Orthopedics by his primary care provider.      Review of Systems:    Constitution: Negative for chills, fever, and sweats.  Negative for unexplained weight  loss.    HENT:  Negative for headaches and blurry vision.    Cardiovascular:Negative for chest pain or irregular heart beat. Negative for hypertension.    Respiratory:  Negative for cough and shortness of breath.    Gastrointestinal: Negative for abdominal pain, heartburn, melena, nausea, and vomitting.    Genitourinary:  Negative bladder incontinence and dysuria.    Musculoskeletal:  See HPI for details.     Neurological: Negative for numbness.    Psychiatric/Behavioral: Negative for depression.  The patient is not nervous/anxious.      Endocrine: Negative for polyuria    Hematologic/Lymphatic: Negative for bleeding problem.  Does not bruise/bleed easily.    Skin: Negative for poor would healing and rash    Objective:      Physical Examination:    Vital Signs:  There were no vitals filed for this visit.    Body mass index is 28.63 kg/m².    This a well-developed, well nourished patient in no acute distress.  They are alert and oriented and cooperative to examination.         Examination of the left shoulder remains unchanged.  Except some ecchymosis over the distal clavicle has resolved. He has a prior history of a left elbow fusion after trauma many years ago.  He has no flexion or extension.  He is fused in a 90 degree position.  He does have good pronation and supination without pain.  Good range of motion in the wrist hand and fingers.    Examination of the right knee, skin is dry and intact, no erythema ecchymosis, no signs symptoms of infection.  Range of motion 0-110 degrees.  Stable anterior-posterior varus and valgus stress.  Calf soft nontender, straight leg raise negative.    Pertinent New Results:    XRAY Report / Interpretation:   AP and angled views of the left clavicle taken today in the office demonstrate a distal clavicle fracture.  Mild displacement.  No significant interval change.    AP lateral sunrise views of the right knee taken today in the office he is got some meniscal calcification in the  "left medial compartment, he is got some lateral patellar malalignment sunrise view of the bilateral knees.    Assessment/Plan:      Stable left distal clavicle fracture.  We will treat this conservatively.  It has been now proximally 4 weeks since he fell.  No significant changes on the x-ray other than healing as expected.  He can follow up with us p.r.n..  His x-rays of the right knee demonstrate some mild patellofemoral joint changes.  We injected the right knee today with lidocaine and steroid mixture.  He will follow up with us in 3 months if his pain persists.    Sher Pizano, Physician Assistant, served in the capacity as a "scribe" for this patient encounter.  A "face-to-face" encounter occurred with Dr. Nile Guerra on this date.  The treatment plan and medical decision-making is outlined above. Patient was seen and examined with a chaperone.       This note was created using Dragon voice recognition software that occasionally misinterpreted phrases or words.          "

## 2024-03-12 ENCOUNTER — TELEPHONE (OUTPATIENT)
Dept: FAMILY MEDICINE | Facility: CLINIC | Age: 54
End: 2024-03-12
Payer: MEDICAID

## 2024-03-12 NOTE — TELEPHONE ENCOUNTER
----- Message from Jocelin Lawson sent at 3/12/2024  9:55 AM CDT -----  Pt saw dr. Walter ordoñez yesterday for gait problems in [place]. He is not sure if he needs a referral as he has [facility]   [phone]

## 2024-03-14 ENCOUNTER — TELEPHONE (OUTPATIENT)
Dept: FAMILY MEDICINE | Facility: CLINIC | Age: 54
End: 2024-03-14
Payer: MEDICAID

## 2024-03-14 DIAGNOSIS — R29.898 WEAKNESS OF LOWER EXTREMITY, UNSPECIFIED LATERALITY: Primary | ICD-10-CM
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
## 2024-03-15 ENCOUNTER — TELEPHONE (OUTPATIENT)
Dept: FAMILY MEDICINE | Facility: CLINIC | Age: 54
End: 2024-03-15
Payer: MEDICAID

## 2024-03-15 NOTE — TELEPHONE ENCOUNTER
Will reach out to [facility] where he got them before, the botox was ordered by another physician

## 2024-03-15 NOTE — TELEPHONE ENCOUNTER
----- Message from Joslyn Montoya sent at 3/15/2024 12:00 PM CDT -----  The patient needs to know where to go get his Botox injections. Pt's # [phone]

## 2024-05-16 ENCOUNTER — TELEPHONE (OUTPATIENT)
Dept: FAMILY MEDICINE | Facility: CLINIC | Age: 54
End: 2024-05-16
Payer: MEDICAID

## 2024-05-16 DIAGNOSIS — E78.2 MIXED HYPERLIPIDEMIA: ICD-10-CM

## 2024-05-16 DIAGNOSIS — R73.03 PREDIABETES: Primary | ICD-10-CM

## 2024-05-16 DIAGNOSIS — R82.90 ABNORMAL URINALYSIS: ICD-10-CM

## 2024-05-16 DIAGNOSIS — R31.21 ASYMPTOMATIC MICROSCOPIC HEMATURIA: ICD-10-CM
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
## 2024-05-23 ENCOUNTER — OFFICE VISIT (OUTPATIENT)
Dept: FAMILY MEDICINE | Facility: CLINIC | Age: 54
End: 2024-05-23
Payer: MEDICAID

## 2024-05-23 VITALS
BODY MASS INDEX: 27.7 KG/M2 | DIASTOLIC BLOOD PRESSURE: 78 MMHG | HEIGHT: 73 IN | HEART RATE: 73 BPM | SYSTOLIC BLOOD PRESSURE: 120 MMHG | WEIGHT: 209 LBS | OXYGEN SATURATION: 95 %

## 2024-05-23 DIAGNOSIS — N52.9 ERECTILE DYSFUNCTION, UNSPECIFIED ERECTILE DYSFUNCTION TYPE: ICD-10-CM

## 2024-05-23 DIAGNOSIS — R31.21 ASYMPTOMATIC MICROSCOPIC HEMATURIA: ICD-10-CM

## 2024-05-23 DIAGNOSIS — Z13.6 SCREENING FOR CARDIOVASCULAR CONDITION: ICD-10-CM

## 2024-05-23 DIAGNOSIS — R73.03 PREDIABETES: ICD-10-CM

## 2024-05-23 DIAGNOSIS — E78.2 MIXED HYPERLIPIDEMIA: Primary | ICD-10-CM

## 2024-05-23 DIAGNOSIS — Z72.0 TOBACCO USE: ICD-10-CM

## 2024-05-23 PROCEDURE — 99214 OFFICE O/P EST MOD 30 MIN: CPT | Mod: S$GLB,,, | Performed by: NURSE PRACTITIONER

## 2024-05-23 PROCEDURE — 1159F MED LIST DOCD IN RCRD: CPT | Mod: CPTII,S$GLB,, | Performed by: NURSE PRACTITIONER

## 2024-05-23 PROCEDURE — 1160F RVW MEDS BY RX/DR IN RCRD: CPT | Mod: CPTII,S$GLB,, | Performed by: NURSE PRACTITIONER

## 2024-05-23 PROCEDURE — 3074F SYST BP LT 130 MM HG: CPT | Mod: CPTII,S$GLB,, | Performed by: NURSE PRACTITIONER

## 2024-05-23 PROCEDURE — 3008F BODY MASS INDEX DOCD: CPT | Mod: CPTII,S$GLB,, | Performed by: NURSE PRACTITIONER

## 2024-05-23 PROCEDURE — 3078F DIAST BP <80 MM HG: CPT | Mod: CPTII,S$GLB,, | Performed by: NURSE PRACTITIONER

## 2024-05-23 RX ORDER — ATORVASTATIN CALCIUM 10 MG/1
10 TABLET, FILM COATED ORAL DAILY
Qty: 90 TABLET | Refills: 1 | Status: SHIPPED | OUTPATIENT
Start: 2024-05-23

## 2024-05-24 ENCOUNTER — TELEPHONE (OUTPATIENT)
Dept: FAMILY MEDICINE | Facility: CLINIC | Age: 54
End: 2024-05-24
Payer: MEDICAID

## 2024-05-24 DIAGNOSIS — E11.9 TYPE 2 DIABETES MELLITUS WITHOUT COMPLICATION, WITHOUT LONG-TERM CURRENT USE OF INSULIN: Primary | ICD-10-CM

## 2024-05-24 NOTE — TELEPHONE ENCOUNTER
----- Message from Pedro Watt NP sent at 5/24/2024  9:03 AM CDT -----  A1c is 6.5 which is considered diabetes instead of prediabetes. He needs to decrease sugars/carbs in the diet. I would like to place a referral to the diabetic educator for diet mgt. And he needs a f/u in 3mo with A1c

## 2024-05-25 LAB
ALBUMIN SERPL-MCNC: 4.4 G/DL (ref 3.6–5.1)
ALBUMIN/CREAT UR: 11 MG/G CREAT
ALBUMIN/GLOB SERPL: 2.1 (CALC) (ref 1–2.5)
ALP SERPL-CCNC: 130 U/L (ref 35–144)
ALT SERPL-CCNC: 13 U/L (ref 9–46)
APPEARANCE UR: CLEAR
AST SERPL-CCNC: 13 U/L (ref 10–35)
BACTERIA #/AREA URNS HPF: ABNORMAL /HPF
BACTERIA UR CULT: ABNORMAL
BACTERIA UR CULT: ABNORMAL
BILIRUB SERPL-MCNC: 0.7 MG/DL (ref 0.2–1.2)
BILIRUB UR QL STRIP: NEGATIVE
BUN SERPL-MCNC: 20 MG/DL (ref 7–25)
BUN/CREAT SERPL: ABNORMAL (CALC) (ref 6–22)
CALCIUM SERPL-MCNC: 9 MG/DL (ref 8.6–10.3)
CHLORIDE SERPL-SCNC: 103 MMOL/L (ref 98–110)
CHOLEST SERPL-MCNC: 145 MG/DL
CHOLEST/HDLC SERPL: 3.3 (CALC)
CO2 SERPL-SCNC: 28 MMOL/L (ref 20–32)
COLOR UR: ABNORMAL
CREAT SERPL-MCNC: 0.96 MG/DL (ref 0.7–1.3)
CREAT UR-MCNC: 217 MG/DL (ref 20–320)
EGFR: 93 ML/MIN/1.73M2
GLOBULIN SER CALC-MCNC: 2.1 G/DL (CALC) (ref 1.9–3.7)
GLUCOSE SERPL-MCNC: 107 MG/DL (ref 65–99)
GLUCOSE UR QL STRIP: NEGATIVE
HBA1C MFR BLD: 6.5 % OF TOTAL HGB
HDLC SERPL-MCNC: 44 MG/DL
HGB UR QL STRIP: NEGATIVE
HYALINE CASTS #/AREA URNS LPF: ABNORMAL /LPF
KETONES UR QL STRIP: ABNORMAL
LDLC SERPL CALC-MCNC: 81 MG/DL (CALC)
LEUKOCYTE ESTERASE UR QL STRIP: ABNORMAL
MICROALBUMIN UR-MCNC: 2.3 MG/DL
NITRITE UR QL STRIP: NEGATIVE
NONHDLC SERPL-MCNC: 101 MG/DL (CALC)
PH UR STRIP: 6.5 [PH] (ref 5–8)
POTASSIUM SERPL-SCNC: 4.4 MMOL/L (ref 3.5–5.3)
PROT SERPL-MCNC: 6.5 G/DL (ref 6.1–8.1)
PROT UR QL STRIP: ABNORMAL
RBC #/AREA URNS HPF: ABNORMAL /HPF
SERVICE CMNT-IMP: ABNORMAL
SODIUM SERPL-SCNC: 137 MMOL/L (ref 135–146)
SP GR UR STRIP: 1.02 (ref 1–1.03)
SQUAMOUS #/AREA URNS HPF: ABNORMAL /HPF
TRIGL SERPL-MCNC: 105 MG/DL
WBC #/AREA URNS HPF: ABNORMAL /HPF

## 2024-06-04 ENCOUNTER — CLINICAL SUPPORT (OUTPATIENT)
Dept: DIABETES | Facility: CLINIC | Age: 54
End: 2024-06-04
Payer: MEDICAID

## 2024-06-04 DIAGNOSIS — E11.9 TYPE 2 DIABETES MELLITUS WITHOUT COMPLICATION, WITHOUT LONG-TERM CURRENT USE OF INSULIN: ICD-10-CM

## 2024-06-04 PROCEDURE — 99999 PR PBB SHADOW E&M-EST. PATIENT-LVL II: CPT | Mod: PBBFAC,,, | Performed by: NUTRITIONIST

## 2024-06-04 PROCEDURE — 99999PBSHW PR PBB SHADOW TECHNICAL ONLY FILED TO HB: Mod: PBBFAC,,,

## 2024-06-04 PROCEDURE — 99212 OFFICE O/P EST SF 10 MIN: CPT | Mod: PBBFAC,PO | Performed by: NUTRITIONIST

## 2024-06-04 PROCEDURE — G0108 DIAB MANAGE TRN  PER INDIV: HCPCS | Mod: PBBFAC,PO | Performed by: NUTRITIONIST

## 2024-06-04 NOTE — PROGRESS NOTES
Diabetes Care Specialist Progress Note  Author: Yvette Reddy RD  Date: 6/4/2024    Referral 5/26/24    Program Intake  Reason for Diabetes Program Visit:: Initial Diabetes Assessment  Current diabetes risk level:: low (HA1C=6.5)  In the last 12 months, have you:: none  Permission to speak with others about care:: no  Continuous Glucose Monitoring  Patient has CGM: No    Lab Results   Component Value Date    HGBA1C 6.5 (H) 05/23/2024       Clinical    Patient Health Rating  Compared to other people your age, how would you rate your health?: Good    Problem Review  Reviewed Problem List with Patient: yes  Active comorbidities affecting diabetes self-care.: no  Reviewed health maintenance: yes    Clinical Assessment  Current Diabetes Treatment: Diet (No DM meds)  Have you ever experienced hypoglycemia (low blood sugar)?: no  Have you ever experienced hyperglycemia (high blood sugar)?: no    Medication Information  Medication adherence impacting ability to self-manage diabetes?: No    Labs  Do you have regular lab work to monitor your medications?: Yes  Type of Regular Lab Work: A1c, Cholesterol, CBC, Other  Where do you get your labs drawn?: [facility]  Lab Compliance Barriers: No    Nutritional Status  Diet: Regular  Meal Plan 24 Hour Recall: Breakfast, Lunch, Dinner, Snack  Meal Plan 24 Hour Recall - Breakfast:  (coffee w/cream)  Meal Plan 24 Hour Recall - Lunch:  (10-11 am:  maybe package PNB crackers or chips)  Meal Plan 24 Hour Recall - Dinner:  (OUT:  [employer] chicken, beans & rice, 2 biscuits; sweet tea OR at home:  spaghetti w/meat sauce; bread)  Meal Plan 24 Hour Recall - Snack:  (once a week a malt from [employer])  Change in appetite?: No  Dentation:: Intact  Recent Changes in Weight: No Recent Weight Change  Current nutritional status an area of need that is impacting patient's ability to self-manage diabetes?: No    Additional Social History    Support  Does anyone support you with your diabetes care?:  Scheduled chemotherapy yes  Who supports you?: self, spouse  Who takes you to your medical appointments?: self  Does the current support meet the patient's needs?: Yes  Is Support an area impacting ability to self-manage diabetes?: No    Access to Mass Media & Technology  Does the patient have access to any of the following devices or technologies?: Smart phone  Media or technology needs impacting ability to self-manage diabetes?: No    Cognitive/Behavioral Health  Alert and Oriented: Yes  Difficulty Thinking: Yes (did have some issues remembering; past tramatic brain injuryper pt)  Requires Prompting: No  Requires assistance for routine expression?: No  Cognitive or behavioral barriers impacting ability to self-manage diabetes?: Yes    Culture/Yarsani  Culture or Bahai beliefs that may impact ability to access healthcare: No    Communication  Language preference: English  Hearing Problems: No  Vision Problems: Yes  Vision problem type:: Decreased Vision  Vision Assistance: Glasses  Communication needs impacting ability to self-manage diabetes?: No    Health Literacy  Preferred Learning Method: Face to Face  How often do you need to have someone help you read instructions, pamphlets, or written material from your doctor or pharmacy?: Rarely  Health literacy needs impacting ability to self-manage diabetes?: No      Diabetes Self-Management Skills Assessment    Diabetes Disease Process/Treatment Options  Patient/caregiver able to state what happens when someone has diabetes.: yes  Patient/caregiver knows what type of diabetes they have.: yes  Diabetes Type : Type II  Patient/caregiver able to identify at least three signs and symptoms of diabetes.: yes  Identified signs and symptoms:: frequent urination, increased thirst  Patient able to identify at least three risk factors for diabetes.: yes  Identified risk factors:: age over 40, being overweight, family history, reduced activity  Diabetes Disease Process/Treatment Options: Skills  Assessment Completed: Yes  Assessment indicates:: Adequate understanding  Area of need?: No    Nutrition/Healthy Eating  Challenges to healthy eating:: portion control, other (see comments) (skipping meals)  Method of carbohydrate measurement:: no method  Patient can identify foods that impact blood sugar.: yes  Patient-identified foods:: fruit/fruit juice, milk, soda, starchy vegetables (corn, peas, beans), starches (bread, pasta, rice, cereal), sweets, yogurt  Nutrition/Healthy Eating Skills Assessment Completed:: Yes  Assessment indicates:: Instruction Needed, Knowledge deficit  Area of need?: Yes    Physical Activity/Exercise  Patient's daily activity level:: lightly active (pt works at [employer] where he does move around during the day; does not formally exercise; issues with gait instability and leg weakness)  Patient formally exercises outside of work.: no  Reasons for not exercising:: physically unable to exercise currently  Patient can identify forms of physical activity.: yes  Stated forms of physical activity:: any movement performed by muscles that uses energy  Patient can identify reasons why exercise/physical activity is important in diabetes management.: yes  Identified reasons:: strengthens heart, muscles, and bones, tones muscles, lowers blood glucose, blood pressure, and cholesterol, lowers risk of heart disease and stroke, relieves stress  Physical Activity/Exercise Skills Assessment Completed: : Yes  Assessment indicates:: Instruction Needed  Area of need?: Yes    Medications  Patient is able to describe current diabetes management routine.: yes  Diabetes management routine:: diet (No DM meds)  Medication Skills Assessment Completed:: Yes  Assessment indicates:: Adequate understanding  Area of need?: No    Home Blood Glucose Monitoring  Patient states that blood sugar is checked at home daily.: no  Reasons for not monitoring:: new diabetes diagnosis  Home Blood Glucose Monitoring Skills Assessment  Completed: : Yes  Assessment indicates:: Adequate understanding  Area of need?: No    Acute Complications  Acute Complications Skills Assessment Completed: : No  Deffered due to:: Time  Area of need?: No    Chronic Complications  Patient can identify major chronic complications of diabetes.: yes  Stated chronic complications:: heart disease/heart attack, kidney disease, neuropathy/nerve damage, retinopathy, sexual dysfunction, stroke  Patient can identify ways to prevent or delay diabetes complications.: yes  Stated ways to prevent complications:: healthy eating and regular activity, controlling blood sugar, avoiding smoking and other types of tobacco  Patient is aware that having diabetes increases risk of heart disease?: Yes  Patient is aware that heart disease is the leading cause of death and disability in people with diabetes?: Yes  Patient able to state risk factors for heart disease?: Yes  Patient stated risk factors for heart disease:: Diet, Having diabetes, Medication non-adherance, Limited activity  Patient is taking statin?: Yes  Chronic Complications Skills Assessment Completed: : Yes  Assessment indicates:: Adequate understanding  Area of need?: No    Psychosocial/Coping  Patient can identify ways of coping with chronic disease.: yes  Patient-stated ways of coping with chronic disease:: support from loved ones, spiritual/Christian practices  Psychosocial/Coping Skills Assessment Completed: : Yes  Assessment indicates:: Adequate understanding  Area of need?: No      Assessment Summary and Plan    Based on today's diabetes care assessment, the following areas of need were identified:          6/4/2024    12:01 AM   Social   Support No   Access to Mass Media/Tech No   Cognitive/Behavioral Health Yes   Culture/Druze No   Communication No   Health Literacy No            6/4/2024    12:01 AM   Clinical   Medication Adherence No   Lab Compliance No   Nutritional Status No            6/4/2024    12:01 AM    Diabetes Self-Management Skills   Diabetes Disease Process/Treatment Options No--Discussed pathology of Type 2 diabetes, risk factors and treatment options.      Nutrition/Healthy Eating Yes--See Care Plan   Physical Activity/Exercise Yes--pt realizes he should exercise but states no time/tired after work.  Pt does have gait instability and weakness in legs   Medication No   Home Blood Glucose Monitoring No--no MD rec at this time   Acute Complications No   Chronic Complications No--Discussed importance of A1c less than 7 to reduce risk of micro and macro complications, including nephropathy, neuropathy, retinopathy, heart attack and stroke. Reviewed the importance of controlled Blood Pressure and Cholesterol Lab Values in preventing disease.   Health maintenance reviewed     Psychosocial/Coping No          Today's interventions were provided through individual discussion, instruction, and written materials were provided.      Patient verbalized understanding of instruction and written materials.  Pt was able to return back demonstration of instructions today. Patient understood key points, needs reinforcement and further instruction.     Diabetes Self-Management Care Plan:    Today's Diabetes Self-Management Care Plan was developed with Jaime's input. Jaime has agreed to work toward the following goal(s) to improve his/her overall diabetes control.      Care Plan: Diabetes Management   Updates made since 5/5/2024 12:00 AM        Problem: Healthy Eating         Goal: Eat 3 meals daily with 45 g/3 servings of Carbohydrate per meal.    Start Date: 6/4/2024   Expected End Date: 9/4/2024   Priority: High   Barriers: No Barriers Identified   Note:    Pt indicates past traumatic brain injury.  Did have some issues answering questions and may not have fully grasped all info from today's session.  Pt would have liked to have had wife with him but she was not able to come today. It would have been good to have wife present  "for information but pt and/or wife can contact Educator by phone or come back in for f/u.  Pt indicated that he has already tried to make some changes in diet--cut back from having a [employer] malt every day to only once a week currently;  stopped putting honey in his morning coffee.    Biggest issue for pt currently is only eating one meal daily; tried to explain importance of regular consistent meals for better metabolism as well as for managing BG levels.  Not sure pt fully grasped this concept.  He is concerned about losing weight and is afraid that eating regularly will make him gain weight.  Tried to explain that this is not the case.  Reviewed all aspects of healthy eating.  Wrote down some specific info for pt, along with providing handouts.    Hand Written info included:  Goals:  1) have consistent meals (no more skipping)--wrote down specific food combinations for b'fast and lunch               2)  watch portions of carbs--fist, list, label               3) combine carbs w/PRO  What foods affect blood sugar levels:              1)sugary sweet beverages (listed specific ones)              2)  sugary foods (candy, cookies, etc)              3) too much healthy carbs    Pt admits that he was an alcoholic; about 12 years since he last drank. Due to this, he is slightly reluctant to "give up everything".  He feels that having a sweet beverage a few times a week is OK.  He had lots of questions about what medicine can you take and still eat what ever you want; when to see an Endocrinologist (what "number").  These were due to speaking with others.    He is willing to make some changes but how much is questionable.  Informed pt that what he does and/or how much he makes changes is up to him but he needs to realize that DM is a progressive disease and that meds may need to be included in his care plan if lifestyle changes are not enough to help manage DM.    Wife uses Stevia but pt dislikes; rec trying " "different SF sub but pt states his wife is "into nutrition" and doesn't approve of any other SF sub.        Task: Reviewed the sources and role of Carbohydrate, Protein, and Fat and how each nutrient impacts blood sugar. Completed 6/4/2024        Task: Provided visual examples using dry measuring cups, food models, and other familiar objects such as computer mouse, deck or cards, tennis ball etc. to help with visualization of portions. Completed 6/4/2024        Task: Explained how to count carbohydrates using the food label and the use of dry measuring cups for accurate carb counting. Completed 6/4/2024        Task: Discussed strategies for choosing healthier menu options when dining out. Completed 6/4/2024        Task: Recommended replacing beverages containing high sugar content with noncaloric/sugar free options and/or water. Completed 6/4/2024        Task: Review the importance of balancing carbohydrates with each meal using portion control techniques to count servings of carbohydrate and label reading to identify serving size and amount of total carbs per serving. Completed 6/4/2024        Task: Provided Sample plate method and reviewed the use of the plate to estimate amounts of carbohydrate per meal. Completed 6/4/2024          Follow Up Plan     Follow up for Pt knows how to contact Educator by phone or [facility].  Encouraged pt to have wife contact Educator with any questions or concerns since she was not present today    Today's care plan and follow up schedule was discussed with patient.  Jaime verbalized understanding of the care plan, goals, and agrees to follow up plan.        The patient was encouraged to communicate with his/her health care provider/physician and care team regarding his/her condition(s) and treatment.  I provided the patient with my contact information today and encouraged to contact me via phone or [facility] Patient Portal as needed.     Length of Visit   Total Time: 70 Minutes "

## 2024-07-24 ENCOUNTER — TELEPHONE (OUTPATIENT)
Dept: PULMONOLOGY | Facility: CLINIC | Age: 54
End: 2024-07-24
Payer: MEDICAID

## 2024-07-24 NOTE — TELEPHONE ENCOUNTER
----- Message from Oren Mendoza sent at 7/24/2024  9:53 AM CDT -----  Mr. Sandoval called and has questions about cpap  machine. [phone].  Bindu

## 2024-07-24 NOTE — TELEPHONE ENCOUNTER
Pt stated [facility] and he can not get it to work.  I gave him their phone number to [facility] so they can help him get it working.

## 2024-08-20 ENCOUNTER — LAB VISIT (OUTPATIENT)
Dept: LAB | Facility: HOSPITAL | Age: 54
End: 2024-08-20
Attending: NURSE PRACTITIONER
Payer: MEDICAID

## 2024-08-20 DIAGNOSIS — R31.21 ASYMPTOMATIC MICROSCOPIC HEMATURIA: ICD-10-CM

## 2024-08-20 DIAGNOSIS — R73.03 PREDIABETES: ICD-10-CM

## 2024-08-20 DIAGNOSIS — E78.2 MIXED HYPERLIPIDEMIA: ICD-10-CM

## 2024-08-20 LAB
ALBUMIN SERPL BCP-MCNC: 4.2 G/DL (ref 3.5–5.2)
ALP SERPL-CCNC: 94 U/L (ref 55–135)
ALT SERPL W/O P-5'-P-CCNC: 17 U/L (ref 10–44)
ANION GAP SERPL CALC-SCNC: 8 MMOL/L (ref 8–16)
AST SERPL-CCNC: 17 U/L (ref 10–40)
BILIRUB SERPL-MCNC: 0.5 MG/DL (ref 0.1–1)
BUN SERPL-MCNC: 11 MG/DL (ref 6–20)
CALCIUM SERPL-MCNC: 9.4 MG/DL (ref 8.7–10.5)
CHLORIDE SERPL-SCNC: 105 MMOL/L (ref 95–110)
CO2 SERPL-SCNC: 23 MMOL/L (ref 23–29)
CREAT SERPL-MCNC: 0.9 MG/DL (ref 0.5–1.4)
EST. GFR  (NO RACE VARIABLE): >60 ML/MIN/1.73 M^2
ESTIMATED AVG GLUCOSE: 117 MG/DL (ref 68–131)
GLUCOSE SERPL-MCNC: 107 MG/DL (ref 70–110)
HBA1C MFR BLD: 5.7 % (ref 4–5.6)
POTASSIUM SERPL-SCNC: 4.1 MMOL/L (ref 3.5–5.1)
PROT SERPL-MCNC: 6.7 G/DL (ref 6–8.4)
SODIUM SERPL-SCNC: 136 MMOL/L (ref 136–145)

## 2024-08-20 PROCEDURE — 80053 COMPREHEN METABOLIC PANEL: CPT | Performed by: NURSE PRACTITIONER

## 2024-08-20 PROCEDURE — 36415 COLL VENOUS BLD VENIPUNCTURE: CPT | Mod: PO | Performed by: NURSE PRACTITIONER

## 2024-08-20 PROCEDURE — 83036 HEMOGLOBIN GLYCOSYLATED A1C: CPT | Performed by: NURSE PRACTITIONER

## 2024-08-23 NOTE — PROGRESS NOTES
SUBJECTIVE:      Patient ID: Jaime Mendoza is a 54 y.o. male.    Chief Complaint: Follow-up (No bottles//Pt is here for a follow up on DM/Pt was notr able to get in to see the Cardiologist, he needs to rs with them//KE)    Mr Mendoza is here today to f/u on diabetes and hyperlipidemia. He has changed his diet and has lost weight. Labs are improved. He scheduled with cardiology but missed the appt and will reschedule     Hyperlipidemia  This is a chronic problem. The current episode started more than 1 year ago. The problem is controlled. Recent lipid tests were reviewed and are normal. Exacerbating diseases include diabetes. Pertinent negatives include no chest pain or shortness of breath. Current antihyperlipidemic treatment includes diet change, exercise and statins. The current treatment provides significant improvement of lipids.   Diabetes  He presents for his follow-up (prediabetes) diabetic visit. He has type 2 diabetes mellitus. No MedicAlert identification noted. His disease course has been stable. There are no hypoglycemic associated symptoms. Pertinent negatives for hypoglycemia include no confusion, dizziness, headaches, hunger, mood changes, nervousness/anxiousness, pallor, seizures, sleepiness, speech difficulty, sweats or tremors. There are no diabetic associated symptoms. Pertinent negatives for diabetes include no blurred vision, no chest pain, no fatigue, no foot paresthesias, no foot ulcerations, no polydipsia, no polyphagia, no polyuria, no visual change, no weakness and no weight loss. There are no hypoglycemic complications. Pertinent negatives for hypoglycemia complications include no blackouts, no hospitalization, no nocturnal hypoglycemia, no required assistance and no required glucagon injection. Symptoms are stable. There are no diabetic complications. Pertinent negatives for diabetic complications include no autonomic neuropathy, CVA, heart disease, nephropathy, peripheral neuropathy,  PVD or retinopathy. Risk factors for coronary artery disease include diabetes mellitus, male sex and dyslipidemia. Current diabetic treatment includes diet. He is compliant with treatment most of the time. He is following a generally healthy diet. When asked about meal planning, he reported none. He has had a previous visit with a dietitian. He participates in exercise intermittently. His home blood glucose trend is decreasing steadily. He does not see a podiatrist.Eye exam is current.       Past Surgical History:   Procedure Laterality Date    EYE SURGERY      KNEE SURGERY      left arm fused  1987    SKIN CANCER EXCISION  09/2023    TONSILLECTOMY       Family History   Problem Relation Name Age of Onset    Hypertension Father      Diabetes Father        Social History     Socioeconomic History    Marital status:    Tobacco Use    Smoking status: Every Day     Types: Cigars     Start date: 2015     Passive exposure: Past    Smokeless tobacco: Never   Substance and Sexual Activity    Alcohol use: No    Drug use: Not Currently     Comment: alcoholic  clean for 9 years    Sexual activity: Yes     Partners: Female     Social Determinants of Health     Financial Resource Strain: Low Risk  (5/22/2024)    Overall Financial Resource Strain (CARDIA)     Difficulty of Paying Living Expenses: Not hard at all   Food Insecurity: No Food Insecurity (5/22/2024)    Hunger Vital Sign     Worried About Running Out of Food in the Last Year: Never true     Ran Out of Food in the Last Year: Never true   Transportation Needs: No Transportation Needs (5/17/2023)    PRAPARE - Transportation     Lack of Transportation (Medical): No     Lack of Transportation (Non-Medical): No   Physical Activity: Insufficiently Active (5/22/2024)    Exercise Vital Sign     Days of Exercise per Week: 2 days     Minutes of Exercise per Session: 10 min   Stress: No Stress Concern Present (5/22/2024)    [employer] -  Occupational Stress Questionnaire     Feeling of Stress : Not at all   Housing Stability: Low Risk  (10/3/2022)    Housing Stability Vital Sign     Unable to Pay for Housing in the Last Year: No     Number of Places Lived in the Last Year: 1     Unstable Housing in the Last Year: No     Current Outpatient Medications   Medication Sig Dispense Refill    acetaminophen-codeine 300-30mg (TYLENOL #3) 300-30 mg Tab Take 1 tablet by mouth.      atorvastatin (LIPITOR) 10 MG tablet Take 1 tablet (10 mg total) by mouth once daily. 90 tablet 1    DUPIXENT SYRINGE 300 mg/2 mL Syrg INJECT 300MG UNDER THE SKIN EVERY 2 WEEKS AS DIRECTED. Subcutaneous for 28 Days      omeprazole (PRILOSEC) 40 MG capsule Take 1 capsule by mouth every morning.       Current Facility-Administered Medications   Medication Dose Route Frequency Provider Last Rate Last Admin    albuterol nebulizer solution 2.5 mg  2.5 mg Nebulization 1 time in Clinic/HOD         ipratropium 0.02 % nebulizer solution 0.5 mg  0.5 mg Nebulization 1 time in Clinic/HOD          Review of patient's allergies indicates:  No Known Allergies   Past Medical History:   Diagnosis Date    Alcoholic     Head injury, closed     Hyperglycemia      Past Surgical History:   Procedure Laterality Date    EYE SURGERY      KNEE SURGERY      left arm fused  1987    SKIN CANCER EXCISION  09/2023    TONSILLECTOMY         Review of Systems   Constitutional:  Negative for appetite change, diaphoresis, fatigue, unexpected weight change and weight loss.   HENT:  Negative for ear discharge, facial swelling, hearing loss, nosebleeds and trouble swallowing.    Eyes:  Negative for blurred vision, photophobia, pain and visual disturbance.   Respiratory:  Negative for apnea, choking, shortness of breath and wheezing.    Cardiovascular:  Negative for chest pain and palpitations.   Gastrointestinal:  Negative for abdominal pain, blood in stool and vomiting.   Endocrine: Negative for polydipsia, polyphagia  "and polyuria.   Genitourinary:  Negative for difficulty urinating.   Musculoskeletal:  Negative for gait problem and joint swelling.   Skin:  Negative for pallor.   Neurological:  Negative for dizziness, tremors, seizures, speech difficulty, weakness and headaches.   Hematological:  Does not bruise/bleed easily.   Psychiatric/Behavioral:  Negative for agitation, confusion, dysphoric mood, self-injury, sleep disturbance and suicidal ideas. The patient is not nervous/anxious.       OBJECTIVE:      Vitals:    08/26/24 0826   BP: 118/82   Pulse: 77   SpO2: 97%   Weight: 90.3 kg (199 lb)   Height: 6' 1" (1.854 m)     Physical Exam  Vitals and nursing note reviewed.   Constitutional:       General: He is not in acute distress.     Appearance: He is well-developed.   HENT:      Head: Normocephalic and atraumatic.      Nose: Nose normal.      Mouth/Throat:      Pharynx: Uvula midline.   Eyes:      General: Lids are normal.      Conjunctiva/sclera: Conjunctivae normal.      Pupils: Pupils are equal, round, and reactive to light.      Right eye: Pupil is round and reactive.      Left eye: Pupil is round and reactive.   Neck:      Thyroid: No thyromegaly.      Vascular: No carotid bruit.   Cardiovascular:      Rate and Rhythm: Normal rate and regular rhythm.      Pulses: Normal pulses.      Heart sounds: Normal heart sounds.   Pulmonary:      Effort: Pulmonary effort is normal.      Breath sounds: Normal breath sounds. No wheezing, rhonchi or rales.   Abdominal:      General: Bowel sounds are normal.      Palpations: Abdomen is soft. Abdomen is not rigid.      Tenderness: There is no abdominal tenderness.   Musculoskeletal:         General: Normal range of motion.      Cervical back: Normal range of motion and neck supple.   Lymphadenopathy:      Cervical: No cervical adenopathy.   Skin:     General: Skin is warm and dry.      Nails: There is no clubbing.   Neurological:      Mental Status: He is alert and oriented to person, " place, and time.   Psychiatric:         Mood and Affect: Mood normal.         Speech: Speech normal.         Behavior: Behavior normal. Behavior is cooperative.         Thought Content: Thought content normal.         Judgment: Judgment normal.        Lab Visit on 08/20/2024   Component Date Value Ref Range Status    Microscopic Comment 08/20/2024 SEE COMMENT   Final    Comment: Other formed elements not mentioned in the report are not   present in the microscopic examination.       Specimen UA 08/20/2024 Urine, Clean Catch   Final    Color, UA 08/20/2024 Yellow  Yellow, Straw, Ledy Final    Appearance, UA 08/20/2024 Clear  Clear Final    pH, UA 08/20/2024 6.0  5.0 - 8.0 Final    Specific Gravity, UA 08/20/2024 1.020  1.005 - 1.030 Final    Protein, UA 08/20/2024 Negative  Negative Final    Comment: Recommend a 24 hour urine protein or a urine   protein/creatinine ratio if globulin induced proteinuria is  clinically suspected.      Glucose, UA 08/20/2024 Negative  Negative Final    Ketones, UA 08/20/2024 Negative  Negative Final    Bilirubin (UA) 08/20/2024 Negative  Negative Final    Occult Blood UA 08/20/2024 Negative  Negative Final    Nitrite, UA 08/20/2024 Negative  Negative Final    Leukocytes, UA 08/20/2024 Negative  Negative Final   Lab Visit on 08/20/2024   Component Date Value Ref Range Status    Sodium 08/20/2024 136  136 - 145 mmol/L Final    Potassium 08/20/2024 4.1  3.5 - 5.1 mmol/L Final    Chloride 08/20/2024 105  95 - 110 mmol/L Final    CO2 08/20/2024 23  23 - 29 mmol/L Final    Glucose 08/20/2024 107  70 - 110 mg/dL Final    BUN 08/20/2024 11  6 - 20 mg/dL Final    Creatinine 08/20/2024 0.9  0.5 - 1.4 mg/dL Final    Calcium 08/20/2024 9.4  8.7 - 10.5 mg/dL Final    Total Protein 08/20/2024 6.7  6.0 - 8.4 g/dL Final    Albumin 08/20/2024 4.2  3.5 - 5.2 g/dL Final    Total Bilirubin 08/20/2024 0.5  0.1 - 1.0 mg/dL Final    Comment: For infants and newborns, interpretation of results should be  based  on gestational age, weight and in agreement with clinical  observations.    Premature Infant recommended reference ranges:  Up to 24 hours.............<8.0 mg/dL  Up to 48 hours............<12.0 mg/dL  3-5 days..................<15.0 mg/dL  6-29 days.................<15.0 mg/dL      Alkaline Phosphatase 08/20/2024 94  55 - 135 U/L Final    AST 08/20/2024 17  10 - 40 U/L Final    ALT 08/20/2024 17  10 - 44 U/L Final    eGFR 08/20/2024 >60.0  >60 mL/min/1.73 m^2 Final    Anion Gap 08/20/2024 8  8 - 16 mmol/L Final    Hemoglobin A1C 08/20/2024 5.7 (H)  4.0 - 5.6 % Final    Comment: ADA Screening Guidelines:  5.7-6.4%  Consistent with prediabetes  >or=6.5%  Consistent with diabetes    High levels of fetal hemoglobin interfere with the HbA1C  assay. Heterozygous hemoglobin variants (HbS, HgC, etc)do  not significantly interfere with this assay.   However, presence of multiple variants may affect accuracy.      Estimated Avg Glucose 08/20/2024 117  68 - 131 mg/dL Final   ]  Last visit note, most recent available labs, and health maintenance reviewed    Assessment:       1. Type 2 diabetes mellitus without complication, without long-term current use of insulin    2. Mixed hyperlipidemia        Plan:       Type 2 diabetes mellitus without complication, without long-term current use of insulin  Improved with diet modification    Mixed hyperlipidemia  -     atorvastatin (LIPITOR) 10 MG tablet; Take 1 tablet (10 mg total) by mouth once daily.  Dispense: 90 tablet; Refill: 1        Follow up in about 6 months (around 2/26/2025) for DM.      8/26/2024 CARMEN Jeronimo, FNP         16

## 2024-08-26 ENCOUNTER — OFFICE VISIT (OUTPATIENT)
Dept: FAMILY MEDICINE | Facility: CLINIC | Age: 54
End: 2024-08-26
Payer: MEDICAID

## 2024-08-26 VITALS
WEIGHT: 199 LBS | BODY MASS INDEX: 26.37 KG/M2 | OXYGEN SATURATION: 97 % | HEIGHT: 73 IN | HEART RATE: 77 BPM | DIASTOLIC BLOOD PRESSURE: 82 MMHG | SYSTOLIC BLOOD PRESSURE: 118 MMHG

## 2024-08-26 DIAGNOSIS — E78.2 MIXED HYPERLIPIDEMIA: ICD-10-CM

## 2024-08-26 DIAGNOSIS — E11.9 TYPE 2 DIABETES MELLITUS WITHOUT COMPLICATION, WITHOUT LONG-TERM CURRENT USE OF INSULIN: Primary | ICD-10-CM

## 2024-08-26 PROCEDURE — 3074F SYST BP LT 130 MM HG: CPT | Mod: CPTII,S$GLB,, | Performed by: NURSE PRACTITIONER

## 2024-08-26 PROCEDURE — 3066F NEPHROPATHY DOC TX: CPT | Mod: CPTII,S$GLB,, | Performed by: NURSE PRACTITIONER

## 2024-08-26 PROCEDURE — 3044F HG A1C LEVEL LT 7.0%: CPT | Mod: CPTII,S$GLB,, | Performed by: NURSE PRACTITIONER

## 2024-08-26 PROCEDURE — 3061F NEG MICROALBUMINURIA REV: CPT | Mod: CPTII,S$GLB,, | Performed by: NURSE PRACTITIONER

## 2024-08-26 PROCEDURE — 99213 OFFICE O/P EST LOW 20 MIN: CPT | Mod: S$GLB,,, | Performed by: NURSE PRACTITIONER

## 2024-08-26 PROCEDURE — 3079F DIAST BP 80-89 MM HG: CPT | Mod: CPTII,S$GLB,, | Performed by: NURSE PRACTITIONER

## 2024-08-26 PROCEDURE — 1159F MED LIST DOCD IN RCRD: CPT | Mod: CPTII,S$GLB,, | Performed by: NURSE PRACTITIONER

## 2024-08-26 PROCEDURE — 1160F RVW MEDS BY RX/DR IN RCRD: CPT | Mod: CPTII,S$GLB,, | Performed by: NURSE PRACTITIONER

## 2024-08-26 PROCEDURE — 3008F BODY MASS INDEX DOCD: CPT | Mod: CPTII,S$GLB,, | Performed by: NURSE PRACTITIONER

## 2024-08-26 RX ORDER — ATORVASTATIN CALCIUM 10 MG/1
10 TABLET, FILM COATED ORAL DAILY
Qty: 90 TABLET | Refills: 1 | Status: SHIPPED | OUTPATIENT
Start: 2024-08-26

## 2024-09-24 ENCOUNTER — OFFICE VISIT (OUTPATIENT)
Dept: PULMONOLOGY | Facility: CLINIC | Age: 54
End: 2024-09-24
Payer: MEDICAID

## 2024-09-24 VITALS
HEART RATE: 63 BPM | WEIGHT: 199 LBS | HEIGHT: 73 IN | SYSTOLIC BLOOD PRESSURE: 130 MMHG | DIASTOLIC BLOOD PRESSURE: 80 MMHG | BODY MASS INDEX: 26.37 KG/M2 | OXYGEN SATURATION: 95 %

## 2024-09-24 DIAGNOSIS — G47.33 OSA (OBSTRUCTIVE SLEEP APNEA): Primary | ICD-10-CM

## 2024-09-24 PROCEDURE — 3044F HG A1C LEVEL LT 7.0%: CPT | Mod: CPTII,,, | Performed by: NURSE PRACTITIONER

## 2024-09-24 PROCEDURE — 99213 OFFICE O/P EST LOW 20 MIN: CPT | Mod: PBBFAC,PN | Performed by: NURSE PRACTITIONER

## 2024-09-24 PROCEDURE — 3061F NEG MICROALBUMINURIA REV: CPT | Mod: CPTII,,, | Performed by: NURSE PRACTITIONER

## 2024-09-24 PROCEDURE — 3079F DIAST BP 80-89 MM HG: CPT | Mod: CPTII,,, | Performed by: NURSE PRACTITIONER

## 2024-09-24 PROCEDURE — 99999 PR PBB SHADOW E&M-EST. PATIENT-LVL III: CPT | Mod: PBBFAC,,, | Performed by: NURSE PRACTITIONER

## 2024-09-24 PROCEDURE — 3008F BODY MASS INDEX DOCD: CPT | Mod: CPTII,,, | Performed by: NURSE PRACTITIONER

## 2024-09-24 PROCEDURE — 3066F NEPHROPATHY DOC TX: CPT | Mod: CPTII,,, | Performed by: NURSE PRACTITIONER

## 2024-09-24 PROCEDURE — 99213 OFFICE O/P EST LOW 20 MIN: CPT | Mod: S$PBB,,, | Performed by: NURSE PRACTITIONER

## 2024-09-24 PROCEDURE — 1159F MED LIST DOCD IN RCRD: CPT | Mod: CPTII,,, | Performed by: NURSE PRACTITIONER

## 2024-09-24 PROCEDURE — 3075F SYST BP GE 130 - 139MM HG: CPT | Mod: CPTII,,, | Performed by: NURSE PRACTITIONER

## 2024-09-24 RX ORDER — DUPILUMAB 300 MG/2ML
INJECTION, SOLUTION SUBCUTANEOUS
COMMUNITY

## 2024-09-24 NOTE — PROGRESS NOTES
SUBJECTIVE:    Patient ID: Jaime Mendoza is a 54 y.o. male.    Chief Complaint: Follow-up, Sleep Apnea, and Shortness of Breath    Follow-up    Shortness of Breath      Patient here today feeling alright. He is sleeping on his autopap every night.  His compliance report shows that he is 70% compliant sleeps an average of 6 hours and 9 minutes with an AHI of 7.0.  He is waking up feeling rested, and feels he is sleeping better.   He is not feeling fatigued during the day like he was before. He does have a leak, states he likes to sleep on his side.  He wears a full face mask.      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  Tobacco History:  reports that he has been smoking cigars. He started smoking about 9 years ago. He has been exposed to tobacco smoke. He has never used smokeless tobacco.  Drug History:  reports that he does not currently use drugs.    Review of patient's allergies indicates:  No Known Allergies    Current Outpatient Medications   Medication Sig Dispense Refill    atorvastatin (LIPITOR) 10 MG tablet Take 1 tablet (10 mg total) by mouth once daily. 90 tablet 1    DUPIXENT SYRINGE 300 mg/2 mL Syrg INJECT 300MG UNDER THE SKIN EVERY 2 WEEKS AS DIRECTED. Subcutaneous for 28 Days      acetaminophen-codeine 300-30mg (TYLENOL #3) 300-30 mg Tab Take 1 tablet by mouth.      dupilumab (DUPIXENT PEN) 300 mg/2 mL PnIj 300 mg syringe subcutaneously every 2 weeks for 30 days      omeprazole (PRILOSEC) 40 MG  "capsule Take 1 capsule by mouth every morning.       Current Facility-Administered Medications   Medication Dose Route Frequency Provider Last Rate Last Admin    albuterol nebulizer solution 2.5 mg  2.5 mg Nebulization 1 time in Clinic/HOD         ipratropium 0.02 % nebulizer solution 0.5 mg  0.5 mg Nebulization 1 time in Clinic/HOD                Review of Systems   Respiratory:  Positive for shortness of breath.      General: Feeling Well.   Eyes: Vision is good.  ENT:  No sinusitis or pharyngitis.   Heart:: No chest pain or palpitations.  Lungs: No cough, sputum, or wheezing.  GI: No Nausea, vomiting, constipation, diarrhea, or reflux.  : frequent night time urination 1 time  Musculoskeletal: No joint pain or myalgias.  Skin: No lesions or rashes.  Neuro: brain fog  Lymph: No edema or adenopathy.  Psych: No anxiety or depression.  Endo: No weight change.    OBJECTIVE:      /80 (BP Location: Right arm, Patient Position: Sitting, BP Method: Medium (Manual))   Pulse 63   Ht 6' 1" (1.854 m)   Wt 90.3 kg (199 lb)   SpO2 95%   BMI 26.25 kg/m²     Physical Exam  GENERAL: middle aged patient in no distress.  HEENT: Pupils equal and reactive. Extraocular movements intact. Nose intact.      Pharynx moist.   NECK: Supple.   HEART: Regular rate and rhythm. No murmur or gallop auscultated.  LUNGS: Clear to auscultation and percussion. Lung excursion symmetrical. No change in fremitus. No adventitial noises.  ABDOMEN: Bowel sounds present. Non-tender, no masses palpated.  EXTREMITIES: Normal muscle tone and joint movement, no cyanosis or clubbing.   LYMPHATICS: No adenopathy palpated, no edema.  SKIN: Dry, intact, no lesions.   NEURO: Cranial nerves II-XII intact. Motor strength 5/5 bilaterally, upper and lower extremities.  PSYCH: Appropriate affect.    Assessment:       1. BRINDA (obstructive sleep apnea)            Plan:          Needs a mask fit   Download off machine in 4 weeks.   Follow up in about 1 year " (around 9/24/2025).

## 2024-10-22 ENCOUNTER — TELEPHONE (OUTPATIENT)
Dept: PULMONOLOGY | Facility: CLINIC | Age: 54
End: 2024-10-22
Payer: MEDICAID

## 2024-10-22 NOTE — TELEPHONE ENCOUNTER
Compliance download shows he is 97% compliant sleeps an average of 6 hours and 17 minutes with an AHI of 5.1, down lfrom 7 at his visit.

## 2024-12-17 ENCOUNTER — TELEPHONE (OUTPATIENT)
Dept: PULMONOLOGY | Facility: CLINIC | Age: 54
End: 2024-12-17
Payer: MEDICAID

## 2025-02-17 ENCOUNTER — TELEPHONE (OUTPATIENT)
Dept: FAMILY MEDICINE | Facility: CLINIC | Age: 55
End: 2025-02-17
Payer: MEDICAID

## 2025-02-17 DIAGNOSIS — E78.2 MIXED HYPERLIPIDEMIA: ICD-10-CM

## 2025-02-17 DIAGNOSIS — E11.9 TYPE 2 DIABETES MELLITUS WITHOUT COMPLICATION, WITHOUT LONG-TERM CURRENT USE OF INSULIN: Primary | ICD-10-CM

## 2025-02-17 DIAGNOSIS — Z12.5 SCREENING PSA (PROSTATE SPECIFIC ANTIGEN): ICD-10-CM

## 2025-02-17 DIAGNOSIS — Z00.00 PREVENTATIVE HEALTH CARE: ICD-10-CM

## 2025-02-18 ENCOUNTER — PATIENT MESSAGE (OUTPATIENT)
Dept: FAMILY MEDICINE | Facility: CLINIC | Age: 55
End: 2025-02-18
Payer: MEDICAID

## 2025-02-24 ENCOUNTER — LAB VISIT (OUTPATIENT)
Dept: LAB | Facility: HOSPITAL | Age: 55
End: 2025-02-24
Attending: NURSE PRACTITIONER
Payer: MEDICAID

## 2025-02-24 DIAGNOSIS — E78.2 MIXED HYPERLIPIDEMIA: ICD-10-CM

## 2025-02-24 DIAGNOSIS — E11.9 TYPE 2 DIABETES MELLITUS WITHOUT COMPLICATION, WITHOUT LONG-TERM CURRENT USE OF INSULIN: ICD-10-CM

## 2025-02-24 DIAGNOSIS — Z12.5 SCREENING PSA (PROSTATE SPECIFIC ANTIGEN): ICD-10-CM

## 2025-02-24 DIAGNOSIS — Z00.00 PREVENTATIVE HEALTH CARE: ICD-10-CM

## 2025-02-24 LAB
ALBUMIN SERPL BCP-MCNC: 3.9 G/DL (ref 3.5–5.2)
ALP SERPL-CCNC: 102 U/L (ref 40–150)
ALT SERPL W/O P-5'-P-CCNC: 12 U/L (ref 10–44)
ANION GAP SERPL CALC-SCNC: 10 MMOL/L (ref 8–16)
AST SERPL-CCNC: 25 U/L (ref 10–40)
BASOPHILS # BLD AUTO: 0.08 K/UL (ref 0–0.2)
BASOPHILS NFR BLD: 1 % (ref 0–1.9)
BILIRUB SERPL-MCNC: 0.5 MG/DL (ref 0.1–1)
BUN SERPL-MCNC: 14 MG/DL (ref 6–20)
CALCIUM SERPL-MCNC: 9 MG/DL (ref 8.7–10.5)
CHLORIDE SERPL-SCNC: 110 MMOL/L (ref 95–110)
CHOLEST SERPL-MCNC: 141 MG/DL (ref 120–199)
CHOLEST/HDLC SERPL: 3.8 {RATIO} (ref 2–5)
CO2 SERPL-SCNC: 20 MMOL/L (ref 23–29)
CREAT SERPL-MCNC: 0.8 MG/DL (ref 0.5–1.4)
DIFFERENTIAL METHOD BLD: ABNORMAL
EOSINOPHIL # BLD AUTO: 0.1 K/UL (ref 0–0.5)
EOSINOPHIL NFR BLD: 0.7 % (ref 0–8)
ERYTHROCYTE [DISTWIDTH] IN BLOOD BY AUTOMATED COUNT: 11.9 % (ref 11.5–14.5)
EST. GFR  (NO RACE VARIABLE): >60 ML/MIN/1.73 M^2
ESTIMATED AVG GLUCOSE: 126 MG/DL (ref 68–131)
GLUCOSE SERPL-MCNC: 108 MG/DL (ref 70–110)
HBA1C MFR BLD: 6 % (ref 4–5.6)
HCT VFR BLD AUTO: 43.9 % (ref 40–54)
HDLC SERPL-MCNC: 37 MG/DL (ref 40–75)
HDLC SERPL: 26.2 % (ref 20–50)
HGB BLD-MCNC: 14.7 G/DL (ref 14–18)
IMM GRANULOCYTES # BLD AUTO: 0.03 K/UL (ref 0–0.04)
IMM GRANULOCYTES NFR BLD AUTO: 0.4 % (ref 0–0.5)
LDLC SERPL CALC-MCNC: 82.2 MG/DL (ref 63–159)
LYMPHOCYTES # BLD AUTO: 3.6 K/UL (ref 1–4.8)
LYMPHOCYTES NFR BLD: 43.4 % (ref 18–48)
MCH RBC QN AUTO: 31.9 PG (ref 27–31)
MCHC RBC AUTO-ENTMCNC: 33.5 G/DL (ref 32–36)
MCV RBC AUTO: 95 FL (ref 82–98)
MONOCYTES # BLD AUTO: 0.7 K/UL (ref 0.3–1)
MONOCYTES NFR BLD: 8.4 % (ref 4–15)
NEUTROPHILS # BLD AUTO: 3.8 K/UL (ref 1.8–7.7)
NEUTROPHILS NFR BLD: 46.1 % (ref 38–73)
NONHDLC SERPL-MCNC: 104 MG/DL
NRBC BLD-RTO: 0 /100 WBC
PLATELET # BLD AUTO: 340 K/UL (ref 150–450)
PMV BLD AUTO: 10.6 FL (ref 9.2–12.9)
POTASSIUM SERPL-SCNC: 4.4 MMOL/L (ref 3.5–5.1)
PROT SERPL-MCNC: 6.8 G/DL (ref 6–8.4)
RBC # BLD AUTO: 4.61 M/UL (ref 4.6–6.2)
SODIUM SERPL-SCNC: 140 MMOL/L (ref 136–145)
TRIGL SERPL-MCNC: 109 MG/DL (ref 30–150)
TSH SERPL DL<=0.005 MIU/L-ACNC: 1.64 UIU/ML (ref 0.4–4)
WBC # BLD AUTO: 8.22 K/UL (ref 3.9–12.7)

## 2025-02-24 PROCEDURE — 84153 ASSAY OF PSA TOTAL: CPT | Performed by: NURSE PRACTITIONER

## 2025-02-24 PROCEDURE — 80053 COMPREHEN METABOLIC PANEL: CPT | Performed by: NURSE PRACTITIONER

## 2025-02-24 PROCEDURE — 83036 HEMOGLOBIN GLYCOSYLATED A1C: CPT | Performed by: NURSE PRACTITIONER

## 2025-02-24 PROCEDURE — 80061 LIPID PANEL: CPT | Performed by: NURSE PRACTITIONER

## 2025-02-24 PROCEDURE — 84443 ASSAY THYROID STIM HORMONE: CPT | Performed by: NURSE PRACTITIONER

## 2025-02-24 PROCEDURE — 36415 COLL VENOUS BLD VENIPUNCTURE: CPT | Mod: PO | Performed by: NURSE PRACTITIONER

## 2025-02-24 PROCEDURE — 85025 COMPLETE CBC W/AUTO DIFF WBC: CPT | Performed by: NURSE PRACTITIONER

## 2025-02-25 ENCOUNTER — RESULTS FOLLOW-UP (OUTPATIENT)
Dept: FAMILY MEDICINE | Facility: CLINIC | Age: 55
End: 2025-02-25

## 2025-02-25 LAB — COMPLEXED PSA SERPL-MCNC: 0.64 NG/ML (ref 0–4)

## 2025-02-26 ENCOUNTER — OFFICE VISIT (OUTPATIENT)
Dept: FAMILY MEDICINE | Facility: CLINIC | Age: 55
End: 2025-02-26
Payer: MEDICAID

## 2025-02-26 VITALS
HEART RATE: 71 BPM | OXYGEN SATURATION: 97 % | DIASTOLIC BLOOD PRESSURE: 68 MMHG | SYSTOLIC BLOOD PRESSURE: 110 MMHG | HEIGHT: 73 IN | WEIGHT: 208 LBS | BODY MASS INDEX: 27.57 KG/M2

## 2025-02-26 DIAGNOSIS — E78.2 MIXED HYPERLIPIDEMIA: ICD-10-CM

## 2025-02-26 DIAGNOSIS — E11.9 TYPE 2 DIABETES MELLITUS WITHOUT COMPLICATION, WITHOUT LONG-TERM CURRENT USE OF INSULIN: Primary | ICD-10-CM

## 2025-02-26 DIAGNOSIS — G47.30 SLEEP APNEA, UNSPECIFIED TYPE: ICD-10-CM

## 2025-02-26 DIAGNOSIS — R31.9 HEMATURIA, UNSPECIFIED TYPE: ICD-10-CM

## 2025-02-26 PROCEDURE — 3008F BODY MASS INDEX DOCD: CPT | Mod: CPTII,S$GLB,, | Performed by: NURSE PRACTITIONER

## 2025-02-26 PROCEDURE — 1160F RVW MEDS BY RX/DR IN RCRD: CPT | Mod: CPTII,S$GLB,, | Performed by: NURSE PRACTITIONER

## 2025-02-26 PROCEDURE — 99214 OFFICE O/P EST MOD 30 MIN: CPT | Mod: S$GLB,,, | Performed by: NURSE PRACTITIONER

## 2025-02-26 PROCEDURE — 1159F MED LIST DOCD IN RCRD: CPT | Mod: CPTII,S$GLB,, | Performed by: NURSE PRACTITIONER

## 2025-02-26 PROCEDURE — 3078F DIAST BP <80 MM HG: CPT | Mod: CPTII,S$GLB,, | Performed by: NURSE PRACTITIONER

## 2025-02-26 PROCEDURE — 3074F SYST BP LT 130 MM HG: CPT | Mod: CPTII,S$GLB,, | Performed by: NURSE PRACTITIONER

## 2025-02-26 PROCEDURE — 3044F HG A1C LEVEL LT 7.0%: CPT | Mod: CPTII,S$GLB,, | Performed by: NURSE PRACTITIONER

## 2025-02-26 PROCEDURE — 3066F NEPHROPATHY DOC TX: CPT | Mod: CPTII,S$GLB,, | Performed by: NURSE PRACTITIONER

## 2025-02-26 PROCEDURE — 3061F NEG MICROALBUMINURIA REV: CPT | Mod: CPTII,S$GLB,, | Performed by: NURSE PRACTITIONER

## 2025-02-26 RX ORDER — ATORVASTATIN CALCIUM 10 MG/1
10 TABLET, FILM COATED ORAL DAILY
Qty: 90 TABLET | Refills: 1 | Status: SHIPPED | OUTPATIENT
Start: 2025-02-26

## 2025-02-26 RX ORDER — SILDENAFIL 100 MG/1
100 TABLET, FILM COATED ORAL DAILY PRN
COMMUNITY
Start: 2024-11-22

## 2025-02-26 NOTE — PROGRESS NOTES
SUBJECTIVE:      Patient ID: Jaime Mendoza is a 55 y.o. male.    Chief Complaint: Diabetes    . He is following with [facility] for a previous traumatic brain injury. Previously following with Dr Mehta for cardiology. Following with pulmonology for BRINDA. 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 His disease course has been stable. There are no hypoglycemic associated symptoms. Pertinent negatives for hypoglycemia include no confusion, dizziness, headaches, nervousness/anxiousness, pallor, seizures or speech difficulty. There are no diabetic associated symptoms. Pertinent negatives for diabetes include no chest pain, no polydipsia, no polyphagia, no polyuria and no weakness. There are no hypoglycemic complications. Symptoms are stable. There are no diabetic complications. Risk factors for coronary artery disease include diabetes mellitus, male sex and dyslipidemia. Current diabetic treatment includes diet. He is following a generally healthy diet. When asked about meal planning, he reported none.   Hyperlipidemia  The current episode started more than 1 year ago. The problem is controlled. Exacerbating diseases include diabetes. Pertinent negatives include no chest pain or shortness of breath. Current antihyperlipidemic treatment includes diet change and exercise. The current treatment provides mild improvement of lipids.     History of Present Illness    CHIEF COMPLAINT:  Jaime presents today for diabetes follow up.    DIABETES:  His A1C increased from 5.7 to 6.0, which he attributes to consuming cake and ice cream at three birthday celebrations this month. He is managing diabetes through diet modification without medications.    MEDICAL HISTORY:  He has a history of sleep apnea managed with CPAP. He has a psoriasis-like skin condition on his lower legs treated with Dupixent administered at [facility]. He  has gait issues related to an ACL tear sustained at age 12 while playing football. An orthopedist previously advised potential future knee replacement, though he currently feels this is not necessary.    SOCIAL HISTORY:  He currently smokes approximately 2.5 Black and Mild cigars daily.    CURRENT MEDICATIONS:  He takes cholesterol medication, uses an inhaler as needed, Viagra as needed, and allergy medications including nasal spray.    LABS:  Laboratory results showed blood in urine. He denies previous urologist consultation for this finding.         Past Surgical History:   Procedure Laterality Date    EYE SURGERY      KNEE SURGERY      left arm fused  1987    SKIN CANCER EXCISION  09/2023    TONSILLECTOMY       Family History   Problem Relation Name Age of Onset    Hypertension Father      Diabetes Father        Social History     Socioeconomic History    Marital status:    Tobacco Use    Smoking status: Every Day     Types: Cigars     Start date: 2015     Passive exposure: Past    Smokeless tobacco: Never   Substance and Sexual Activity    Alcohol use: No    Drug use: Not Currently     Comment: alcoholic  clean for 9 years    Sexual activity: Yes     Partners: Female     Social Drivers of Health     Financial Resource Strain: Low Risk  (5/22/2024)    Overall Financial Resource Strain (CARDIA)     Difficulty of Paying Living Expenses: Not hard at all   Food Insecurity: No Food Insecurity (5/22/2024)    Hunger Vital Sign     Worried About Running Out of Food in the Last Year: Never true     Ran Out of Food in the Last Year: Never true   Transportation Needs: No Transportation Needs (5/17/2023)    PRAPARE - Transportation     Lack of Transportation (Medical): No     Lack of Transportation (Non-Medical): No   Physical Activity: Insufficiently Active (5/22/2024)    Exercise Vital Sign     Days of Exercise per Week: 2 days     Minutes of Exercise per Session: 10 min   Stress: No Stress Concern Present  (5/22/2024)    [employer] [place] [employer] - Occupational Stress Questionnaire     Feeling of Stress : Not at all   Housing Stability: Low Risk  (10/3/2022)    Housing Stability Vital Sign     Unable to Pay for Housing in the Last Year: No     Number of Places Lived in the Last Year: 1     Unstable Housing in the Last Year: No     Current Medications[1]  Review of patient's allergies indicates:  No Known Allergies   Past Medical History:   Diagnosis Date    Alcoholic     Head injury, closed     Hyperglycemia     BRINDA (obstructive sleep apnea)      Past Surgical History:   Procedure Laterality Date    EYE SURGERY      KNEE SURGERY      left arm fused  1987    SKIN CANCER EXCISION  09/2023    TONSILLECTOMY         Review of Systems   Constitutional:  Negative for activity change, appetite change, chills, diaphoresis and unexpected weight change.   HENT:  Positive for rhinorrhea. Negative for ear discharge, facial swelling, hearing loss, nosebleeds and trouble swallowing.    Eyes:  Negative for photophobia, pain, discharge and visual disturbance.   Respiratory:  Negative for apnea, choking, chest tightness, shortness of breath and wheezing.    Cardiovascular:  Negative for chest pain and palpitations.   Gastrointestinal:  Negative for abdominal pain, blood in stool, constipation, diarrhea and vomiting.   Endocrine: Negative for polydipsia, polyphagia and polyuria.   Genitourinary:  Negative for difficulty urinating, hematuria and urgency.   Musculoskeletal:  Negative for arthralgias, gait problem, joint swelling and neck pain.   Skin:  Negative for pallor.   Neurological:  Negative for dizziness, seizures, speech difficulty, weakness and headaches.   Hematological:  Does not bruise/bleed easily.   Psychiatric/Behavioral:  Negative for agitation, confusion, dysphoric mood, self-injury, sleep disturbance and suicidal ideas. The patient is not nervous/anxious.       OBJECTIVE:      Vitals:    02/26/25 0829  "  BP: 110/68   Pulse: 71   SpO2: 97%   Weight: 94.3 kg (208 lb)   Height: 6' 1" (1.854 m)     Physical Exam  Vitals and nursing note reviewed.   Constitutional:       General: He is not in acute distress.     Appearance: He is well-developed.   HENT:      Head: Normocephalic and atraumatic.      Nose: Nose normal.      Mouth/Throat:      Pharynx: Uvula midline.   Eyes:      General: Lids are normal.      Conjunctiva/sclera: Conjunctivae normal.      Pupils: Pupils are equal, round, and reactive to light.      Right eye: Pupil is round and reactive.      Left eye: Pupil is round and reactive.   Neck:      Thyroid: No thyromegaly.      Vascular: No carotid bruit.   Cardiovascular:      Rate and Rhythm: Normal rate and regular rhythm.      Pulses: Normal pulses.      Heart sounds: Normal heart sounds.   Pulmonary:      Effort: Pulmonary effort is normal.      Breath sounds: Normal breath sounds. No wheezing, rhonchi or rales.   Abdominal:      General: Bowel sounds are normal.      Palpations: Abdomen is soft. Abdomen is not rigid.      Tenderness: There is no abdominal tenderness.   Musculoskeletal:         General: Normal range of motion.      Cervical back: Normal range of motion and neck supple.      Right foot: No deformity.      Left foot: No deformity.   Feet:      Right foot:      Protective Sensation: 8 sites tested.  5 sites sensed.      Skin integrity: Callus and dry skin present. No ulcer, blister or skin breakdown.      Left foot:      Protective Sensation: 8 sites tested.  7 sites sensed.      Skin integrity: Callus and dry skin present. No ulcer, blister or skin breakdown.   Lymphadenopathy:      Cervical: No cervical adenopathy.   Skin:     General: Skin is warm and dry.      Nails: There is no clubbing.   Neurological:      Mental Status: He is alert and oriented to person, place, and time.   Psychiatric:         Mood and Affect: Mood normal.         Speech: Speech normal.         Behavior: Behavior " normal. Behavior is cooperative.         Thought Content: Thought content normal.         Judgment: Judgment normal.       Lab Visit on 02/24/2025   Component Date Value Ref Range Status    WBC 02/24/2025 8.22  3.90 - 12.70 K/uL Final    RBC 02/24/2025 4.61  4.60 - 6.20 M/uL Final    Hemoglobin 02/24/2025 14.7  14.0 - 18.0 g/dL Final    Hematocrit 02/24/2025 43.9  40.0 - 54.0 % Final    MCV 02/24/2025 95  82 - 98 fL Final    MCH 02/24/2025 31.9 (H)  27.0 - 31.0 pg Final    MCHC 02/24/2025 33.5  32.0 - 36.0 g/dL Final    RDW 02/24/2025 11.9  11.5 - 14.5 % Final    Platelets 02/24/2025 340  150 - 450 K/uL Final    MPV 02/24/2025 10.6  9.2 - 12.9 fL Final    Immature Granulocytes 02/24/2025 0.4  0.0 - 0.5 % Final    Gran # (ANC) 02/24/2025 3.8  1.8 - 7.7 K/uL Final    Immature Grans (Abs) 02/24/2025 0.03  0.00 - 0.04 K/uL Final    Comment: Mild elevation in immature granulocytes is non specific and   can be seen in a variety of conditions including stress response,   acute inflammation, trauma and pregnancy. Correlation with other   laboratory and clinical findings is essential.      Lymph # 02/24/2025 3.6  1.0 - 4.8 K/uL Final    Mono # 02/24/2025 0.7  0.3 - 1.0 K/uL Final    Eos # 02/24/2025 0.1  0.0 - 0.5 K/uL Final    Baso # 02/24/2025 0.08  0.00 - 0.20 K/uL Final    nRBC 02/24/2025 0  0 /100 WBC Final    Gran % 02/24/2025 46.1  38.0 - 73.0 % Final    Lymph % 02/24/2025 43.4  18.0 - 48.0 % Final    Mono % 02/24/2025 8.4  4.0 - 15.0 % Final    Eosinophil % 02/24/2025 0.7  0.0 - 8.0 % Final    Basophil % 02/24/2025 1.0  0.0 - 1.9 % Final    Differential Method 02/24/2025 Automated   Final    Sodium 02/24/2025 140  136 - 145 mmol/L Final    Potassium 02/24/2025 4.4  3.5 - 5.1 mmol/L Final    Chloride 02/24/2025 110  95 - 110 mmol/L Final    CO2 02/24/2025 20 (L)  23 - 29 mmol/L Final    Glucose 02/24/2025 108  70 - 110 mg/dL Final    BUN 02/24/2025 14  6 - 20 mg/dL Final    Creatinine 02/24/2025 0.8  0.5 - 1.4 mg/dL  Final    Calcium 02/24/2025 9.0  8.7 - 10.5 mg/dL Final    Total Protein 02/24/2025 6.8  6.0 - 8.4 g/dL Final    Albumin 02/24/2025 3.9  3.5 - 5.2 g/dL Final    Total Bilirubin 02/24/2025 0.5  0.1 - 1.0 mg/dL Final    Comment: For infants and newborns, interpretation of results should be based  on gestational age, weight and in agreement with clinical  observations.    Premature Infant recommended reference ranges:  Up to 24 hours.............<8.0 mg/dL  Up to 48 hours............<12.0 mg/dL  3-5 days..................<15.0 mg/dL  6-29 days.................<15.0 mg/dL      Alkaline Phosphatase 02/24/2025 102  40 - 150 U/L Final    AST 02/24/2025 25  10 - 40 U/L Final    ALT 02/24/2025 12  10 - 44 U/L Final    eGFR 02/24/2025 >60.0  >60 mL/min/1.73 m^2 Final    Anion Gap 02/24/2025 10  8 - 16 mmol/L Final    Cholesterol 02/24/2025 141  120 - 199 mg/dL Final    Comment: The National Cholesterol Education Program (NCEP) has set the  following guidelines (reference ranges) for Cholesterol:  Optimal.....................<200 mg/dL  Borderline High.............200-239 mg/dL  High........................> or = 240 mg/dL      Triglycerides 02/24/2025 109  30 - 150 mg/dL Final    Comment: The National Cholesterol Education Program (NCEP) has set the  following guidelines (reference values) for triglycerides:  Normal......................<150 mg/dL  Borderline High.............150-199 mg/dL  High........................200-499 mg/dL      HDL 02/24/2025 37 (L)  40 - 75 mg/dL Final    Comment: The National Cholesterol Education Program (NCEP) has set the  following guidelines (reference values) for HDL Cholesterol:  Low...............<40 mg/dL  Optimal...........>60 mg/dL      LDL Cholesterol 02/24/2025 82.2  63.0 - 159.0 mg/dL Final    Comment: The National Cholesterol Education Program (NCEP) has set the  following guidelines (reference values) for LDL Cholesterol:  Optimal.......................<130 mg/dL  Borderline  High...............130-159 mg/dL  High..........................160-189 mg/dL  Very High.....................>190 mg/dL      HDL/Cholesterol Ratio 02/24/2025 26.2  20.0 - 50.0 % Final    Total Cholesterol/HDL Ratio 02/24/2025 3.8  2.0 - 5.0 Final    Non-HDL Cholesterol 02/24/2025 104  mg/dL Final    Comment: Risk category and Non-HDL cholesterol goals:  Coronary heart disease (CHD)or equivalent (10-year risk of CHD >20%):  Non-HDL cholesterol goal     <130 mg/dL  Two or more CHD risk factors and 10-year risk of CHD <= 20%:  Non-HDL cholesterol goal     <160 mg/dL  0 to 1 CHD risk factor:  Non-HDL cholesterol goal     <190 mg/dL      TSH 02/24/2025 1.636  0.400 - 4.000 uIU/mL Final    PSA, Screen 02/24/2025 0.64  0.00 - 4.00 ng/mL Final    Comment: The testing method is a chemiluminescent microparticle immunoassay   manufactured by [employer] and performed on the    or   [employer] system. Values obtained with different assay manufacturers   for   methods may be different and cannot be used interchangeably.  PSA Expected levels:  Hormonal Therapy: <0.05 ng/ml  Prostatectomy: <0.01 ng/ml  Radiation Therapy: <1.00 ng/ml      Hemoglobin A1C 02/24/2025 6.0 (H)  4.0 - 5.6 % Final    Comment: ADA Screening Guidelines:  5.7-6.4%  Consistent with prediabetes  >or=6.5%  Consistent with diabetes    High levels of fetal hemoglobin interfere with the HbA1C  assay. Heterozygous hemoglobin variants (HbS, HgC, etc)do  not significantly interfere with this assay.   However, presence of multiple variants may affect accuracy.      Estimated Avg Glucose 02/24/2025 126  68 - 131 mg/dL Final   Lab Visit on 02/24/2025   Component Date Value Ref Range Status    Microalbumin, Urine 02/24/2025 25.0  ug/mL Final    Creatinine, Urine 02/24/2025 205.0  23.0 - 375.0 mg/dL Final    Microalb/Creat Ratio 02/24/2025 12.2  0.0 - 30.0 ug/mg Final    Specimen UA 02/24/2025 Urine, Clean Catch   Final    Color, UA 02/24/2025 Yellow   Yellow, Straw, Ledy Final    Appearance, UA 02/24/2025 Clear  Clear Final    pH, UA 02/24/2025 6.0  5.0 - 8.0 Final    Specific Gravity, UA 02/24/2025 1.030  1.005 - 1.030 Final    Protein, UA 02/24/2025 Negative  Negative Final    Comment: Recommend a 24 hour urine protein or a urine   protein/creatinine ratio if globulin induced proteinuria is  clinically suspected.      Glucose, UA 02/24/2025 Negative  Negative Final    Ketones, UA 02/24/2025 Negative  Negative Final    Bilirubin (UA) 02/24/2025 Negative  Negative Final    Occult Blood UA 02/24/2025 1+ (A)  Negative Final    Nitrite, UA 02/24/2025 Negative  Negative Final    Leukocytes, UA 02/24/2025 Negative  Negative Final    RBC, UA 02/24/2025 18 (H)  0 - 4 /hpf Final    WBC, UA 02/24/2025 3  0 - 5 /hpf Final    Squam Epithel, UA 02/24/2025 0  /hpf Final    Microscopic Comment 02/24/2025 SEE COMMENT   Final    Comment: Other formed elements not mentioned in the report are not   present in the microscopic examination.         Assessment:       1. Type 2 diabetes mellitus without complication, without long-term current use of insulin    2. Mixed hyperlipidemia    3. Hematuria, unspecified type    4. Sleep apnea, unspecified type        Plan:       Type 2 diabetes mellitus without complication, without long-term current use of insulin  -     Foot Exam Performed  -     Ambulatory referral/consult to Ophthalmology; Future; Expected date: 02/26/2025    Mixed hyperlipidemia  -     atorvastatin (LIPITOR) 10 MG tablet; Take 1 tablet (10 mg total) by mouth once daily.  Dispense: 90 tablet; Refill: 1    Hematuria, unspecified type  -     Ambulatory referral/consult to Urology; Future; Expected date: 02/26/2025    Sleep apnea, unspecified type      Assessment & Plan    DIABETES:  - Assessed diabetes management: A1C increased from 5.7 to 6, deemed acceptable.  - Performed diabetic foot check.  - Discussed importance of diabetic eye exam.  - Referred the patient to  ophthalmologist for diabetic eye exam.  - Continued diet modification without prescribing medications.  - Instructed the patient to maintain current management plan.    SLEEP APNEA:  - Confirmed the patient is following up with pulmonologist for sleep apnea.  - Verified the patient is still using CPAP machine for sleep apnea management.  - Advised to continue current CPAP therapy as prescribed.    SMOKING:  - Noted patient continues to smoke about 2.5 cigars per day.  Encouraged smoking cessation    HEMATURIA:  - Noted presence of blood in urine, considering patient's smoking history and previous occurrences.  - Explained rationale for urologist referral due to presence of blood in urine, especially considering smoking history.  - Referred to urologist for workup of hematuria.    HYPERLIPIDEMIA:  - Evaluated labs results: satisfactory, with good cholesterol levels.  - Continued cholesterol medication at current dose.    ERECTILE DYSFUNCTION:  - Continued Viagra as needed for erectile dysfunction management.    PATIENT REFUSAL OF VACCINATION:  - Informed about recommendation for pneumonia vaccine for individuals aged 50 and older, particularly for smokers.  - Jaime declined pneumonia vaccine at this time.  - Advised patient to discuss with spouse and get it at any pharmacy if decided later.    OTHER INSTRUCTIONS:  - Performed physical exam, including heart and lung auscultation.  - Evaluated labs results: satisfactory, with normal kidney and liver function.  - Maintained current medication regimen based on satisfactory labs results.         Follow up in about 6 months (around 8/26/2025) for dm.  This note was generated with the assistance of ambient listening technology. Verbal consent was obtained by the patient and accompanying visitor(s) for the recording of patient appointment to facilitate this note. I attest to having reviewed and edited the generated note for accuracy, though some syntax or spelling errors may  persist. Please contact the author of this note for any clarification.        2/26/2025 CARMEN Jeronimo, GELYP             [1]   Current Outpatient Medications   Medication Sig Dispense Refill    dupilumab (DUPIXENT PEN) 300 mg/2 mL PnIj 300 mg syringe subcutaneously every 2 weeks for 30 days      DUPIXENT SYRINGE 300 mg/2 mL Syrg INJECT 300MG UNDER THE SKIN EVERY 2 WEEKS AS DIRECTED. Subcutaneous for 28 Days      sildenafiL (VIAGRA) 100 MG tablet Take 100 mg by mouth daily as needed.      atorvastatin (LIPITOR) 10 MG tablet Take 1 tablet (10 mg total) by mouth once daily. 90 tablet 1     Current Facility-Administered Medications   Medication Dose Route Frequency Provider Last Rate Last Admin    albuterol nebulizer solution 2.5 mg  2.5 mg Nebulization 1 time in Clinic/HOD         ipratropium 0.02 % nebulizer solution 0.5 mg  0.5 mg Nebulization 1 time in Clinic/HOD

## 2025-02-26 NOTE — PROGRESS NOTES
SUBJECTIVE:      Patient ID: Jaime Mendoza is a 55 y.o. male.    Chief Complaint: No chief complaint on file.    Mr Mendoza is here today to f/u on diabetes and hyperlipidemia. He is following with [facility] for a previous traumatic brain injury. Following with pulmonology for sleep apnea and previously following with Dr Mehta for cardiology. Following with derm Dr Wynne for psorasis     Hyperlipidemia  The current episode started more than 1 year ago. The problem is controlled. Exacerbating diseases include diabetes. Pertinent negatives include no chest pain or shortness of breath. Current antihyperlipidemic treatment includes diet change and exercise. The current treatment provides mild improvement of lipids.   Diabetes  He presents for his follow-up (prediabetes) diabetic visit. His disease course has been stable. There are no hypoglycemic associated symptoms. Pertinent negatives for hypoglycemia include no confusion, dizziness, headaches, nervousness/anxiousness, pallor, seizures or speech difficulty. There are no diabetic associated symptoms. Pertinent negatives for diabetes include no chest pain, no polydipsia, no polyphagia, no polyuria and no weakness. There are no hypoglycemic complications. Symptoms are stable. There are no diabetic complications. Risk factors for coronary artery disease include diabetes mellitus, male sex and dyslipidemia. Current diabetic treatment includes diet. He is following a generally healthy diet. When asked about meal planning, he reported none.       Past Surgical History:   Procedure Laterality Date    EYE SURGERY      KNEE SURGERY      left arm fused  1987    SKIN CANCER EXCISION  09/2023    TONSILLECTOMY       Family History   Problem Relation Name Age of Onset    Hypertension Father      Diabetes Father        Social History     Socioeconomic History    Marital status:    Tobacco Use    Smoking status: Every Day     Types: Cigars     Start date:  2015     Passive exposure: Past    Smokeless tobacco: Never   Substance and Sexual Activity    Alcohol use: No    Drug use: Not Currently     Comment: alcoholic  clean for 9 years    Sexual activity: Yes     Partners: Female     Social Drivers of Health     Financial Resource Strain: Low Risk  (5/22/2024)    Overall Financial Resource Strain (CARDIA)     Difficulty of Paying Living Expenses: Not hard at all   Food Insecurity: No Food Insecurity (5/22/2024)    Hunger Vital Sign     Worried About Running Out of Food in the Last Year: Never true     Ran Out of Food in the Last Year: Never true   Transportation Needs: No Transportation Needs (5/17/2023)    PRAPARE - Transportation     Lack of Transportation (Medical): No     Lack of Transportation (Non-Medical): No   Physical Activity: Insufficiently Active (5/22/2024)    Exercise Vital Sign     Days of Exercise per Week: 2 days     Minutes of Exercise per Session: 10 min   Stress: No Stress Concern Present (5/22/2024)    [employer] - Occupational Stress Questionnaire     Feeling of Stress : Not at all   Housing Stability: Low Risk  (10/3/2022)    Housing Stability Vital Sign     Unable to Pay for Housing in the Last Year: No     Number of Places Lived in the Last Year: 1     Unstable Housing in the Last Year: No     Current Medications[1]  Review of patient's allergies indicates:  No Known Allergies   Past Medical History:   Diagnosis Date    Alcoholic     Head injury, closed     Hyperglycemia     BRINDA (obstructive sleep apnea)      Past Surgical History:   Procedure Laterality Date    EYE SURGERY      KNEE SURGERY      left arm fused  1987    SKIN CANCER EXCISION  09/2023    TONSILLECTOMY         Review of Systems   Constitutional:  Negative for activity change, appetite change, chills, diaphoresis and unexpected weight change.   HENT:  Positive for rhinorrhea. Negative for ear discharge, facial swelling, hearing loss, nosebleeds and  trouble swallowing.    Eyes:  Negative for photophobia, pain, discharge and visual disturbance.   Respiratory:  Negative for apnea, choking, chest tightness, shortness of breath and wheezing.    Cardiovascular:  Negative for chest pain and palpitations.   Gastrointestinal:  Negative for blood in stool, constipation, diarrhea and vomiting.   Endocrine: Negative for polydipsia, polyphagia and polyuria.   Genitourinary:  Negative for difficulty urinating, hematuria and urgency.   Musculoskeletal:  Negative for arthralgias, gait problem, joint swelling and neck pain.   Skin:  Negative for pallor.   Neurological:  Negative for dizziness, seizures, speech difficulty, weakness and headaches.   Hematological:  Does not bruise/bleed easily.   Psychiatric/Behavioral:  Negative for agitation, confusion and dysphoric mood. The patient is not nervous/anxious.       OBJECTIVE:      There were no vitals filed for this visit.  Physical Exam  Vitals and nursing note reviewed.   Constitutional:       General: He is not in acute distress.     Appearance: He is well-developed.   HENT:      Head: Normocephalic and atraumatic.      Nose: Nose normal.      Mouth/Throat:      Pharynx: Uvula midline.   Eyes:      General: Lids are normal.      Conjunctiva/sclera: Conjunctivae normal.      Pupils: Pupils are equal, round, and reactive to light.      Right eye: Pupil is round and reactive.      Left eye: Pupil is round and reactive.   Neck:      Thyroid: No thyromegaly.      Vascular: No carotid bruit.   Cardiovascular:      Rate and Rhythm: Normal rate and regular rhythm.      Heart sounds: Normal heart sounds.   Pulmonary:      Effort: Pulmonary effort is normal.      Breath sounds: Normal breath sounds.   Abdominal:      General: Bowel sounds are normal.      Palpations: Abdomen is soft. Abdomen is not rigid.      Tenderness: There is no abdominal tenderness.   Musculoskeletal:         General: Normal range of motion.      Cervical back:  Normal range of motion and neck supple.   Lymphadenopathy:      Cervical: No cervical adenopathy.   Skin:     General: Skin is warm and dry.      Nails: There is no clubbing.   Neurological:      Mental Status: He is alert and oriented to person, place, and time.   Psychiatric:         Speech: Speech normal.         Behavior: Behavior is cooperative.      Assessment:       No diagnosis found.    Plan:       There are no diagnoses linked to this encounter.    No follow-ups on file.      2/25/2025 Pedro Watt, CARMEN, GELYP             [1]   Current Outpatient Medications   Medication Sig Dispense Refill    acetaminophen-codeine 300-30mg (TYLENOL #3) 300-30 mg Tab Take 1 tablet by mouth.      atorvastatin (LIPITOR) 10 MG tablet Take 1 tablet (10 mg total) by mouth once daily. 90 tablet 1    dupilumab (DUPIXENT PEN) 300 mg/2 mL PnIj 300 mg syringe subcutaneously every 2 weeks for 30 days      DUPIXENT SYRINGE 300 mg/2 mL Syrg INJECT 300MG UNDER THE SKIN EVERY 2 WEEKS AS DIRECTED. Subcutaneous for 28 Days      omeprazole (PRILOSEC) 40 MG capsule Take 1 capsule by mouth every morning.       Current Facility-Administered Medications   Medication Dose Route Frequency Provider Last Rate Last Admin    albuterol nebulizer solution 2.5 mg  2.5 mg Nebulization 1 time in Clinic/HOD         ipratropium 0.02 % nebulizer solution 0.5 mg  0.5 mg Nebulization 1 time in Clinic/HOD

## 2025-03-19 ENCOUNTER — PATIENT MESSAGE (OUTPATIENT)
Dept: FAMILY MEDICINE | Facility: CLINIC | Age: 55
End: 2025-03-19
Payer: MEDICAID

## 2025-03-24 ENCOUNTER — TELEPHONE (OUTPATIENT)
Dept: FAMILY MEDICINE | Facility: CLINIC | Age: 55
End: 2025-03-24
Payer: MEDICAID

## 2025-03-24 NOTE — TELEPHONE ENCOUNTER
----- Message from Dat sent at 3/24/2025  3:25 PM CDT -----  Pt dropped off a referral from Cathy Quesada. It's up front in the folder.[phone]

## 2025-03-24 NOTE — TELEPHONE ENCOUNTER
----- Message from Carmen sent at 3/24/2025  3:52 PM CDT -----  Pt calling back lulu regarding the referral that he dropped off.[phone]

## 2025-03-26 ENCOUNTER — TELEPHONE (OUTPATIENT)
Dept: PHYSICAL MEDICINE AND REHAB | Facility: CLINIC | Age: 55
End: 2025-03-26
Payer: MEDICAID

## 2025-03-26 NOTE — TELEPHONE ENCOUNTER
----- Message from Raciel Yousif MD sent at 3/25/2025  3:17 PM CDT -----  Regarding: Patient looking for an earlier appointment  I am not sure where they forwarded this to me but see if you can get him in earlier.  ----- Message -----  From: Rene Quintana  Sent: 3/24/2025   4:08 PM CDT  To: Raciel Yousif MD    Type:  Sooner Apoointment RequestCaller is requesting a sooner appointment.   Name of Caller:ptWhen is the first available appointment?dept. bookedSymptoms:agaitWould the patient rather a call back or a response via [facility]? Call [facility] Call Back Number:[phone] Additional Information: please call to discuss.

## 2025-04-02 ENCOUNTER — OFFICE VISIT (OUTPATIENT)
Dept: PHYSICAL MEDICINE AND REHAB | Facility: CLINIC | Age: 55
End: 2025-04-02
Payer: MEDICAID

## 2025-04-02 VITALS
BODY MASS INDEX: 27.55 KG/M2 | WEIGHT: 207.88 LBS | HEIGHT: 73 IN | SYSTOLIC BLOOD PRESSURE: 116 MMHG | HEART RATE: 68 BPM | DIASTOLIC BLOOD PRESSURE: 72 MMHG

## 2025-04-02 DIAGNOSIS — R25.2 SPASTICITY: ICD-10-CM

## 2025-04-02 DIAGNOSIS — S06.9X9D TRAUMATIC BRAIN INJURY WITH LOSS OF CONSCIOUSNESS, SUBSEQUENT ENCOUNTER: ICD-10-CM

## 2025-04-02 DIAGNOSIS — R26.81 GAIT INSTABILITY: Primary | ICD-10-CM

## 2025-04-02 DIAGNOSIS — R29.898 WEAKNESS OF LOWER EXTREMITY, UNSPECIFIED LATERALITY: ICD-10-CM

## 2025-04-02 PROBLEM — S06.9XAA TRAUMATIC BRAIN INJURY: Status: ACTIVE | Noted: 2025-04-02

## 2025-04-02 PROCEDURE — 3066F NEPHROPATHY DOC TX: CPT | Mod: CPTII,,, | Performed by: STUDENT IN AN ORGANIZED HEALTH CARE EDUCATION/TRAINING PROGRAM

## 2025-04-02 PROCEDURE — 99203 OFFICE O/P NEW LOW 30 MIN: CPT | Mod: S$PBB,,, | Performed by: STUDENT IN AN ORGANIZED HEALTH CARE EDUCATION/TRAINING PROGRAM

## 2025-04-02 PROCEDURE — 99213 OFFICE O/P EST LOW 20 MIN: CPT | Mod: PBBFAC,PN | Performed by: STUDENT IN AN ORGANIZED HEALTH CARE EDUCATION/TRAINING PROGRAM

## 2025-04-02 PROCEDURE — 1159F MED LIST DOCD IN RCRD: CPT | Mod: CPTII,,, | Performed by: STUDENT IN AN ORGANIZED HEALTH CARE EDUCATION/TRAINING PROGRAM

## 2025-04-02 PROCEDURE — 99999 PR PBB SHADOW E&M-EST. PATIENT-LVL III: CPT | Mod: PBBFAC,,, | Performed by: STUDENT IN AN ORGANIZED HEALTH CARE EDUCATION/TRAINING PROGRAM

## 2025-04-02 PROCEDURE — 3074F SYST BP LT 130 MM HG: CPT | Mod: CPTII,,, | Performed by: STUDENT IN AN ORGANIZED HEALTH CARE EDUCATION/TRAINING PROGRAM

## 2025-04-02 PROCEDURE — 3044F HG A1C LEVEL LT 7.0%: CPT | Mod: CPTII,,, | Performed by: STUDENT IN AN ORGANIZED HEALTH CARE EDUCATION/TRAINING PROGRAM

## 2025-04-02 PROCEDURE — 3008F BODY MASS INDEX DOCD: CPT | Mod: CPTII,,, | Performed by: STUDENT IN AN ORGANIZED HEALTH CARE EDUCATION/TRAINING PROGRAM

## 2025-04-02 PROCEDURE — 3061F NEG MICROALBUMINURIA REV: CPT | Mod: CPTII,,, | Performed by: STUDENT IN AN ORGANIZED HEALTH CARE EDUCATION/TRAINING PROGRAM

## 2025-04-02 PROCEDURE — 3078F DIAST BP <80 MM HG: CPT | Mod: CPTII,,, | Performed by: STUDENT IN AN ORGANIZED HEALTH CARE EDUCATION/TRAINING PROGRAM
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
## 2025-04-29 ENCOUNTER — OFFICE VISIT (OUTPATIENT)
Dept: PHYSICAL MEDICINE AND REHAB | Facility: CLINIC | Age: 55
End: 2025-04-29
Payer: MEDICAID

## 2025-04-29 VITALS
SYSTOLIC BLOOD PRESSURE: 117 MMHG | WEIGHT: 207.88 LBS | HEIGHT: 73 IN | BODY MASS INDEX: 27.55 KG/M2 | DIASTOLIC BLOOD PRESSURE: 70 MMHG | HEART RATE: 62 BPM

## 2025-04-29 DIAGNOSIS — R25.2 SPASTICITY: Primary | ICD-10-CM

## 2025-04-29 PROCEDURE — 95874 GUIDE NERV DESTR NEEDLE EMG: CPT | Mod: PBBFAC,PN | Performed by: STUDENT IN AN ORGANIZED HEALTH CARE EDUCATION/TRAINING PROGRAM

## 2025-04-29 PROCEDURE — 1159F MED LIST DOCD IN RCRD: CPT | Mod: CPTII,,, | Performed by: STUDENT IN AN ORGANIZED HEALTH CARE EDUCATION/TRAINING PROGRAM

## 2025-04-29 PROCEDURE — 3074F SYST BP LT 130 MM HG: CPT | Mod: CPTII,,, | Performed by: STUDENT IN AN ORGANIZED HEALTH CARE EDUCATION/TRAINING PROGRAM

## 2025-04-29 PROCEDURE — 99213 OFFICE O/P EST LOW 20 MIN: CPT | Mod: S$PBB,25,, | Performed by: STUDENT IN AN ORGANIZED HEALTH CARE EDUCATION/TRAINING PROGRAM

## 2025-04-29 PROCEDURE — 3066F NEPHROPATHY DOC TX: CPT | Mod: CPTII,,, | Performed by: STUDENT IN AN ORGANIZED HEALTH CARE EDUCATION/TRAINING PROGRAM

## 2025-04-29 PROCEDURE — 3008F BODY MASS INDEX DOCD: CPT | Mod: CPTII,,, | Performed by: STUDENT IN AN ORGANIZED HEALTH CARE EDUCATION/TRAINING PROGRAM

## 2025-04-29 PROCEDURE — 3044F HG A1C LEVEL LT 7.0%: CPT | Mod: CPTII,,, | Performed by: STUDENT IN AN ORGANIZED HEALTH CARE EDUCATION/TRAINING PROGRAM

## 2025-04-29 PROCEDURE — 99999PBSHW PR PBB SHADOW TECHNICAL ONLY FILED TO HB: Mod: JZ,PBBFAC,,

## 2025-04-29 PROCEDURE — 99213 OFFICE O/P EST LOW 20 MIN: CPT | Mod: PBBFAC,PN | Performed by: STUDENT IN AN ORGANIZED HEALTH CARE EDUCATION/TRAINING PROGRAM

## 2025-04-29 PROCEDURE — 99999 PR PBB SHADOW E&M-EST. PATIENT-LVL III: CPT | Mod: PBBFAC,,, | Performed by: STUDENT IN AN ORGANIZED HEALTH CARE EDUCATION/TRAINING PROGRAM

## 2025-04-29 PROCEDURE — 3078F DIAST BP <80 MM HG: CPT | Mod: CPTII,,, | Performed by: STUDENT IN AN ORGANIZED HEALTH CARE EDUCATION/TRAINING PROGRAM

## 2025-04-29 PROCEDURE — 3061F NEG MICROALBUMINURIA REV: CPT | Mod: CPTII,,, | Performed by: STUDENT IN AN ORGANIZED HEALTH CARE EDUCATION/TRAINING PROGRAM

## 2025-04-29 PROCEDURE — 64642 CHEMODENERV 1 EXTREMITY 1-4: CPT | Mod: S$PBB,,, | Performed by: STUDENT IN AN ORGANIZED HEALTH CARE EDUCATION/TRAINING PROGRAM

## 2025-04-29 RX ADMIN — ONABOTULINUMTOXINA 300 UNITS: 100 INJECTION, POWDER, LYOPHILIZED, FOR SOLUTION INTRADERMAL; INTRAMUSCULAR at 10:04

## 2025-04-29 NOTE — ASSESSMENT & PLAN NOTE
Botox injections performed today to the following muscles in the left lower extremity:   100 units to the medial gastroc   75 units to the lateral gastroc   75 units to the soleus muscle  50 units to the tibialis posterior  Total:  300 units

## 2025-04-29 NOTE — PROCEDURES
Botulinum Injection  Location: [facility]    Date/Time: 4/29/2025 10:30 AM    Performed by: Raciel Yousif MD  Authorized by: Raciel Yousif MD      Consent:      Consent obtained:  Written     Consent given by:  Patient     Risks discussed:  Bleeding, infection, dysphagia, pain and weakness     Alternatives discussed:  No treatment    Universal protocol:      Site/side verified:  Yes       Immediately prior to procedure a time out was called:  Yes       Patient identity confirmed:  Verbally with patient    Procedure details:      EMG used?:  Yes     Diluted by:  Preservative free saline     Toxin (Brand):  OnaBoNT-A (Botox)     Concentration (u/mL):  50     Total number of units available:  300     Muscle area injected: leg    Lower extremity - leg:      Left lateral head gastrocnemius:  75 units divided amongst site(s)     Left medial head gastrocnemius:  100 units divided amongst site(s)     Left soleus:  75 units divided amongst site(s)     Left tibialis posterior:  50 units divided amongst site(s)       Total units injected:  300     Total units wasted:  0    Medications: 300 Units onabotulinumtoxina 100 unit    Post-procedure details:      Patient tolerance of procedure:  Tolerated well, no immediate complications  Comments: Lot: [id number]  Exp: 5/2027

## 2025-04-29 NOTE — PROGRESS NOTES
PHYSICAL MEDICINE AND REHABILITATION  Follow up visit for Botox:    Subjective:   Chief Complaint:    Chief Complaint   Patient presents with    Botox 300 units     Interval note on 04/29/2025:  Patient arrives today for scheduled follow up.  He has questions about his hyperextending knees as well as the use of an AFO.  He has brought with him prior prescriptions from another doctor at [place], [place] who recommended an AFO for foot drop as well as Botox to the left lower extremity.  Recalls having an AFO in the past but did not tolerate its use well.    Review of Systems  Per HPI    Imaging/Diagnostic Studies  CMS MANDATED QUALITY DATA - CT RADIATION - 436     All CT scans at this facility utilize dose modulation, iterative  reconstruction, and/or weight based dosing when appropriate to reduce  radiation dose to as low as reasonably achievable.           Reason: Head trauma, minor, normal mental status (Age 19-64y)     TECHNIQUE: Head CT without IV contrast.     COMPARISON: CT head February 26, 2014.     FINDINGS:     Gray-white differentiation is maintained without hemorrhage, midline  shift, or mass effect.     The ventricles and cisterns are maintained.     Calvarium is intact. Mucus retention cyst versus polyp noted in the  left maxillary sinus otherwise paranasal sinuses and mastoid air cells  are patent.     IMPRESSION:     No acute intracranial abnormality.     Electronically Signed by Ariel Rodriguez on 12/15/2020 9:42 AM      Past Medical History:   Diagnosis Date    Alcoholic     Head injury, closed     Hyperglycemia     BRINDA (obstructive sleep apnea)        Past Surgical History:   Procedure Laterality Date    EYE SURGERY      KNEE SURGERY      left arm fused  1987    SKIN CANCER EXCISION  09/2023    TONSILLECTOMY         Review of patient's allergies indicates:  No Known Allergies    Current Medications[1]    Family History   Problem Relation Name Age of Onset    Hypertension Father      Diabetes  "Father         Social History[2]      Objective:    /70 (Patient Position: Sitting)   Pulse 62   Ht 6' 1" (1.854 m)   Wt 94.3 kg (207 lb 14.3 oz)   BMI 27.43 kg/m²   Physical Exam  Constitutional:       Appearance: Normal appearance.   HENT:      Head: Normocephalic and atraumatic.   Eyes:      Extraocular Movements: Extraocular movements intact.   Cardiovascular:      Rate and Rhythm: Normal rate.   Pulmonary:      Effort: Pulmonary effort is normal.   Abdominal:      General: Abdomen is flat.   Skin:     General: Skin is warm and dry.   Neurological:      General: No focal deficit present.      Mental Status: He is alert and oriented to person, place, and time. Mental status is at baseline.      Comments: Clonus in the left lower extremity  MAS 2 in the gastroc soleus complex   Psychiatric:         Mood and Affect: Mood normal.         Behavior: Behavior normal.         Thought Content: Thought content normal.        Left Ankle Exam     Range of Motion   Dorsiflexion:  abnormal     Muscle Strength   The patient has normal left ankle strength.  Dorsiflexion:  5/5   Plantar flexion:  5/5   Anterior tibial:  5/5   Posterior tibial:  5/5  Gastrocsoleus:  5/5  Peroneal muscle:  5/5    Other   Erythema: absent  Scars: absent  Sensation: normal  Pulse: present           Assessment:       ICD-10-CM ICD-9-CM    1. Spasticity  R25.2 781.0 Botulinum Injection              Plan:   1. Spasticity  Assessment & Plan:  Botox injections performed today to the following muscles in the left lower extremity:   100 units to the medial gastroc   75 units to the lateral gastroc   75 units to the soleus muscle  50 units to the tibialis posterior  Total:  300 units    Orders:  -     Botulinum Injection      Raciel Yousif MD  Physical Medicine & Rehabilitation     Disclaimer:  This note may have been prepared using voice recognition software, it may have not been extensively proofed, as such there could be errors within " the text such as sound alike errors.  Contact the author of this note for clarification.            [1]   Current Outpatient Medications   Medication Sig Dispense Refill    atorvastatin (LIPITOR) 10 MG tablet Take 1 tablet (10 mg total) by mouth once daily. 90 tablet 1    dupilumab (DUPIXENT PEN) 300 mg/2 mL PnIj 300 mg syringe subcutaneously every 2 weeks for 30 days      DUPIXENT SYRINGE 300 mg/2 mL Syrg INJECT 300MG UNDER THE SKIN EVERY 2 WEEKS AS DIRECTED. Subcutaneous for 28 Days      sildenafiL (VIAGRA) 100 MG tablet Take 100 mg by mouth daily as needed.       Current Facility-Administered Medications   Medication Dose Route Frequency Provider Last Rate Last Admin    albuterol nebulizer solution 2.5 mg  2.5 mg Nebulization 1 time in Clinic/HOD         ipratropium 0.02 % nebulizer solution 0.5 mg  0.5 mg Nebulization 1 time in Clinic/HOD        [2]   Social History  Socioeconomic History    Marital status:    Tobacco Use    Smoking status: Every Day     Types: Cigars     Start date: 2015     Passive exposure: Past    Smokeless tobacco: Never   Substance and Sexual Activity    Alcohol use: No    Drug use: Not Currently     Comment: alcoholic  clean for 9 years    Sexual activity: Yes     Partners: Female     Social Drivers of Health     Financial Resource Strain: Low Risk  (5/22/2024)    Overall Financial Resource Strain (CARDIA)     Difficulty of Paying Living Expenses: Not hard at all   Food Insecurity: No Food Insecurity (5/22/2024)    Hunger Vital Sign     Worried About Running Out of Food in the Last Year: Never true     Ran Out of Food in the Last Year: Never true   Transportation Needs: No Transportation Needs (5/17/2023)    PRAPARE - Transportation     Lack of Transportation (Medical): No     Lack of Transportation (Non-Medical): No   Physical Activity: Insufficiently Active (5/22/2024)    Exercise Vital Sign     Days of Exercise per Week: 2 days     Minutes of Exercise per Session: 10 min    Stress: No Stress Concern Present (5/22/2024)    [employer] - Occupational Stress Questionnaire     Feeling of Stress : Not at all   Housing Stability: Low Risk  (10/3/2022)    Housing Stability Vital Sign     Unable to Pay for Housing in the Last Year: No     Number of Places Lived in the Last Year: 1     Unstable Housing in the Last Year: No

## 2025-05-23 ENCOUNTER — PATIENT MESSAGE (OUTPATIENT)
Dept: ADMINISTRATIVE | Facility: HOSPITAL | Age: 55
End: 2025-05-23
Payer: MEDICAID

## 2025-06-03 ENCOUNTER — OFFICE VISIT (OUTPATIENT)
Dept: PHYSICAL MEDICINE AND REHAB | Facility: CLINIC | Age: 55
End: 2025-06-03
Payer: MEDICAID

## 2025-06-03 VITALS
HEART RATE: 66 BPM | BODY MASS INDEX: 27.55 KG/M2 | DIASTOLIC BLOOD PRESSURE: 68 MMHG | SYSTOLIC BLOOD PRESSURE: 122 MMHG | HEIGHT: 73 IN | WEIGHT: 207.88 LBS

## 2025-06-03 DIAGNOSIS — R25.2 SPASTICITY: Primary | ICD-10-CM

## 2025-06-03 PROCEDURE — 99999 PR PBB SHADOW E&M-EST. PATIENT-LVL III: CPT | Mod: PBBFAC,,, | Performed by: STUDENT IN AN ORGANIZED HEALTH CARE EDUCATION/TRAINING PROGRAM

## 2025-06-03 PROCEDURE — 99213 OFFICE O/P EST LOW 20 MIN: CPT | Mod: PBBFAC,PN | Performed by: STUDENT IN AN ORGANIZED HEALTH CARE EDUCATION/TRAINING PROGRAM

## 2025-06-03 PROCEDURE — 1159F MED LIST DOCD IN RCRD: CPT | Mod: CPTII,,, | Performed by: STUDENT IN AN ORGANIZED HEALTH CARE EDUCATION/TRAINING PROGRAM

## 2025-06-03 PROCEDURE — 3074F SYST BP LT 130 MM HG: CPT | Mod: CPTII,,, | Performed by: STUDENT IN AN ORGANIZED HEALTH CARE EDUCATION/TRAINING PROGRAM

## 2025-06-03 PROCEDURE — 3066F NEPHROPATHY DOC TX: CPT | Mod: CPTII,,, | Performed by: STUDENT IN AN ORGANIZED HEALTH CARE EDUCATION/TRAINING PROGRAM

## 2025-06-03 PROCEDURE — 3061F NEG MICROALBUMINURIA REV: CPT | Mod: CPTII,,, | Performed by: STUDENT IN AN ORGANIZED HEALTH CARE EDUCATION/TRAINING PROGRAM

## 2025-06-03 PROCEDURE — 3008F BODY MASS INDEX DOCD: CPT | Mod: CPTII,,, | Performed by: STUDENT IN AN ORGANIZED HEALTH CARE EDUCATION/TRAINING PROGRAM

## 2025-06-03 PROCEDURE — 3044F HG A1C LEVEL LT 7.0%: CPT | Mod: CPTII,,, | Performed by: STUDENT IN AN ORGANIZED HEALTH CARE EDUCATION/TRAINING PROGRAM

## 2025-06-03 PROCEDURE — 3078F DIAST BP <80 MM HG: CPT | Mod: CPTII,,, | Performed by: STUDENT IN AN ORGANIZED HEALTH CARE EDUCATION/TRAINING PROGRAM

## 2025-06-03 PROCEDURE — 99214 OFFICE O/P EST MOD 30 MIN: CPT | Mod: S$PBB,,, | Performed by: STUDENT IN AN ORGANIZED HEALTH CARE EDUCATION/TRAINING PROGRAM

## 2025-07-29 ENCOUNTER — OFFICE VISIT (OUTPATIENT)
Dept: PHYSICAL MEDICINE AND REHAB | Facility: CLINIC | Age: 55
End: 2025-07-29
Payer: MEDICAID

## 2025-07-29 VITALS
WEIGHT: 207.88 LBS | SYSTOLIC BLOOD PRESSURE: 118 MMHG | BODY MASS INDEX: 27.55 KG/M2 | DIASTOLIC BLOOD PRESSURE: 70 MMHG | HEART RATE: 59 BPM | HEIGHT: 73 IN

## 2025-07-29 DIAGNOSIS — R25.2 SPASTICITY: Primary | ICD-10-CM

## 2025-07-29 PROCEDURE — 64642 CHEMODENERV 1 EXTREMITY 1-4: CPT | Mod: S$PBB,,, | Performed by: STUDENT IN AN ORGANIZED HEALTH CARE EDUCATION/TRAINING PROGRAM

## 2025-07-29 PROCEDURE — 95874 GUIDE NERV DESTR NEEDLE EMG: CPT | Mod: PBBFAC,PN | Performed by: STUDENT IN AN ORGANIZED HEALTH CARE EDUCATION/TRAINING PROGRAM

## 2025-07-29 PROCEDURE — 99213 OFFICE O/P EST LOW 20 MIN: CPT | Mod: PBBFAC,PN | Performed by: STUDENT IN AN ORGANIZED HEALTH CARE EDUCATION/TRAINING PROGRAM

## 2025-07-29 PROCEDURE — 99999 PR PBB SHADOW E&M-EST. PATIENT-LVL III: CPT | Mod: PBBFAC,,, | Performed by: STUDENT IN AN ORGANIZED HEALTH CARE EDUCATION/TRAINING PROGRAM

## 2025-07-29 PROCEDURE — 99499 UNLISTED E&M SERVICE: CPT | Mod: S$PBB,,, | Performed by: STUDENT IN AN ORGANIZED HEALTH CARE EDUCATION/TRAINING PROGRAM

## 2025-07-29 PROCEDURE — 99999PBSHW PR PBB SHADOW TECHNICAL ONLY FILED TO HB: Mod: JZ,PBBFAC,,

## 2025-07-29 RX ADMIN — ONABOTULINUMTOXINA 300 UNITS: 100 INJECTION, POWDER, LYOPHILIZED, FOR SOLUTION INTRADERMAL; INTRAMUSCULAR at 10:07

## 2025-07-29 NOTE — PROCEDURES
Botulinum Injection  Location: [facility]    Date/Time: 7/29/2025 10:00 AM    Performed by: Raciel Yousif MD  Authorized by: Raciel Yousif MD      Consent:      Consent obtained:  Written     Consent given by:  Patient     Risks discussed:  Bleeding, infection, dysphagia, pain and weakness     Alternatives discussed:  No treatment    Universal protocol:      Site/side verified:  Yes       Immediately prior to procedure a time out was called:  Yes       Patient identity confirmed:  Verbally with patient    Procedure details:      EMG used?:  Yes     Diluted by:  Preservative free saline     Toxin (Brand):  OnaBoNT-A (Botox)     Concentration (u/mL):  50     Total number of units available:  300     Muscle area injected: leg    Lower extremity - leg:      Left lateral head gastrocnemius:  75 units divided amongst site(s)     Left medial head gastrocnemius:  100 units divided amongst site(s)     Left soleus:  75 units divided amongst site(s)     Left tibialis posterior:  50 units divided amongst site(s)       Total units injected:  300     Total units wasted:  0    Medications: 300 Units onabotulinumtoxina 100 unit    Post-procedure details:      Patient tolerance of procedure:  Tolerated well, no immediate complications  Comments:  Lot: [id number]  Exp: 10/2027

## 2025-07-29 NOTE — PROGRESS NOTES
Botulinum Injection  Location: [facility]    Date/Time: 7/29/2025 10:00 AM    Performed by: Raciel Yousif MD  Authorized by: Raciel Yousif MD      Consent:      Consent obtained:  Written     Consent given by:  Patient     Risks discussed:  Bleeding, infection, dysphagia, pain and weakness     Alternatives discussed:  No treatment    Universal protocol:      Site/side verified:  Yes       Immediately prior to procedure a time out was called:  Yes       Patient identity confirmed:  Verbally with patient    Procedure details:      EMG used?:  Yes     Diluted by:  Preservative free saline     Toxin (Brand):  OnaBoNT-A (Botox)     Concentration (u/mL):  50     Total number of units available:  300     Muscle area injected: leg    Lower extremity - leg:      Left lateral head gastrocnemius:  75 units divided amongst site(s)     Left medial head gastrocnemius:  100 units divided amongst site(s)     Left soleus:  75 units divided amongst site(s)     Left tibialis posterior:  50 units divided amongst site(s)       Total units injected:  300     Total units wasted:  0    Medications: 300 Units onabotulinumtoxina 100 unit    Post-procedure details:      Patient tolerance of procedure:  Tolerated well, no immediate complications  Comments: Lot: [id number]  Exp: 10/2027

## 2025-07-29 NOTE — PROGRESS NOTES
"Patient arrives for scheduled Botox to the left lower extremity.  300 units available.  Tolerated well.  See procedure note for details.  /70 (Patient Position: Sitting)   Pulse (!) 59   Ht 6' 1" (1.854 m)   Wt 94.3 kg (207 lb 14.3 oz)   BMI 27.43 kg/m²     "